# Patient Record
Sex: FEMALE | Race: WHITE
[De-identification: names, ages, dates, MRNs, and addresses within clinical notes are randomized per-mention and may not be internally consistent; named-entity substitution may affect disease eponyms.]

---

## 2019-11-16 ENCOUNTER — HOSPITAL ENCOUNTER (EMERGENCY)
Age: 64
Discharge: HOME | End: 2019-11-16
Payer: COMMERCIAL

## 2019-11-16 VITALS — BODY MASS INDEX: 30.7 KG/M2 | BODY MASS INDEX: 30.1 KG/M2

## 2019-11-16 VITALS
OXYGEN SATURATION: 96 % | SYSTOLIC BLOOD PRESSURE: 149 MMHG | HEART RATE: 83 BPM | RESPIRATION RATE: 17 BRPM | DIASTOLIC BLOOD PRESSURE: 81 MMHG

## 2019-11-16 VITALS
OXYGEN SATURATION: 95 % | RESPIRATION RATE: 16 BRPM | DIASTOLIC BLOOD PRESSURE: 92 MMHG | HEART RATE: 87 BPM | TEMPERATURE: 98.24 F | SYSTOLIC BLOOD PRESSURE: 155 MMHG

## 2019-11-16 DIAGNOSIS — E78.00: ICD-10-CM
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
## 2019-11-16 DIAGNOSIS — I10: ICD-10-CM
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
## 2019-11-16 DIAGNOSIS — Z79.899: ICD-10-CM
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
## 2019-11-16 DIAGNOSIS — S82.001A: Primary | ICD-10-CM

## 2019-11-16 PROCEDURE — 99284 EMERGENCY DEPT VISIT MOD MDM: CPT

## 2019-11-16 PROCEDURE — 73564 X-RAY EXAM KNEE 4 OR MORE: CPT

## 2020-08-28 ENCOUNTER — HOSPITAL ENCOUNTER (OUTPATIENT)
Age: 65
Discharge: HOME | End: 2020-08-28
Payer: COMMERCIAL

## 2020-08-28 VITALS — BODY MASS INDEX: 30.7 KG/M2

## 2020-08-28 DIAGNOSIS — Z12.31: Primary | ICD-10-CM

## 2020-08-28 PROCEDURE — 77063 BREAST TOMOSYNTHESIS BI: CPT

## 2020-08-28 PROCEDURE — 77067 SCR MAMMO BI INCL CAD: CPT

## 2021-11-12 ENCOUNTER — HOSPITAL ENCOUNTER (EMERGENCY)
Age: 66
Discharge: HOME | End: 2021-11-12
Payer: COMMERCIAL

## 2021-11-12 VITALS
RESPIRATION RATE: 16 BRPM | DIASTOLIC BLOOD PRESSURE: 64 MMHG | SYSTOLIC BLOOD PRESSURE: 130 MMHG | HEART RATE: 86 BPM | OXYGEN SATURATION: 95 %

## 2021-11-12 VITALS
SYSTOLIC BLOOD PRESSURE: 130 MMHG | HEART RATE: 89 BPM | DIASTOLIC BLOOD PRESSURE: 85 MMHG | OXYGEN SATURATION: 92 % | RESPIRATION RATE: 14 BRPM

## 2021-11-12 VITALS
RESPIRATION RATE: 17 BRPM | TEMPERATURE: 96.62 F | OXYGEN SATURATION: 96 % | DIASTOLIC BLOOD PRESSURE: 77 MMHG | SYSTOLIC BLOOD PRESSURE: 148 MMHG | HEART RATE: 96 BPM

## 2021-11-12 VITALS — BODY MASS INDEX: 32.5 KG/M2

## 2021-11-12 VITALS — DIASTOLIC BLOOD PRESSURE: 77 MMHG | SYSTOLIC BLOOD PRESSURE: 148 MMHG

## 2021-11-12 DIAGNOSIS — I10: ICD-10-CM

## 2021-11-12 DIAGNOSIS — R10.13: Primary | ICD-10-CM

## 2021-11-12 DIAGNOSIS — Z79.899: ICD-10-CM

## 2021-11-12 DIAGNOSIS — E87.6: ICD-10-CM

## 2021-11-12 DIAGNOSIS — M19.90: ICD-10-CM

## 2021-11-12 LAB
ALANINE AMINOTRANSFER ALT/SGPT: 25 U/L (ref 13–56)
ALBUMIN SERPL-MCNC: 3.3 G/DL (ref 3.2–5)
ALKALINE PHOSPHATASE: 74 U/L (ref 45–117)
ANION GAP: 8 (ref 5–15)
AST(SGOT): 15 U/L (ref 15–37)
BILIRUB DIRECT SERPL-MCNC: 0.2 MG/DL (ref 0–0.3)
BUN SERPL-MCNC: 12 MG/DL (ref 7–18)
BUN/CREAT RATIO: 17.3 RATIO (ref 10–20)
CALCIUM SERPL-MCNC: 8.4 MG/DL (ref 8.5–10.1)
CARBON DIOXIDE: 27 MMOL/L (ref 21–32)
CHLORIDE: 103 MMOL/L (ref 98–107)
DEPRECATED RDW RBC: 38.1 FL (ref 35.1–43.9)
ERYTHROCYTE [DISTWIDTH] IN BLOOD: 12 % (ref 11.6–14.6)
EST GLOM FILT RATE - AFR AMER: 109 ML/MIN (ref 60–?)
ESTIMATED CREATININE CLEARANCE: 90.75 ML/MIN
GLOBULIN: 3.4 G/DL (ref 2.2–4.2)
GLUCOSE: 143 MG/DL (ref 74–106)
HCT VFR BLD AUTO: 43.6 % (ref 37–47)
HEMOGLOBIN: 15.6 G/DL (ref 12–15)
HGB BLD-MCNC: 15.6 G/DL (ref 12–15)
IMMATURE GRANULOCYTES COUNT: 0.03 X10^3/UL (ref 0–0)
LIPASE: 77 U/L (ref 73–393)
MCV RBC: 86.3 FL (ref 81–99)
MEAN CORP HGB CONC: 35.8 G/DL (ref 32–36)
MEAN PLATELET VOL.: 9.3 FL (ref 6.2–12)
NRBC FLAGGED BY ANALYZER: 0 % (ref 0–5)
PLATELET # BLD: 210 K/MM3 (ref 150–450)
PLATELET COUNT: 210 K/MM3 (ref 150–450)
POTASSIUM: 2.9 MMOL/L (ref 3.5–5.1)
RBC # BLD AUTO: 5.05 M/MM3 (ref 4.2–5.4)
RBC DISTRIBUTION WIDTH CV: 12 % (ref 11.6–14.6)
RBC DISTRIBUTION WIDTH SD: 38.1 FL (ref 35.1–43.9)
WBC # BLD AUTO: 7.6 K/MM3 (ref 4.4–11)
WHITE BLOOD COUNT: 7.6 K/MM3 (ref 4.4–11)

## 2021-11-12 PROCEDURE — 83690 ASSAY OF LIPASE: CPT

## 2021-11-12 PROCEDURE — 80048 BASIC METABOLIC PNL TOTAL CA: CPT

## 2021-11-12 PROCEDURE — 99284 EMERGENCY DEPT VISIT MOD MDM: CPT

## 2021-11-12 PROCEDURE — 96361 HYDRATE IV INFUSION ADD-ON: CPT

## 2021-11-12 PROCEDURE — 85025 COMPLETE CBC W/AUTO DIFF WBC: CPT

## 2021-11-12 PROCEDURE — 96375 TX/PRO/DX INJ NEW DRUG ADDON: CPT

## 2021-11-12 PROCEDURE — 80076 HEPATIC FUNCTION PANEL: CPT

## 2021-11-12 PROCEDURE — 76705 ECHO EXAM OF ABDOMEN: CPT

## 2021-11-12 PROCEDURE — 96374 THER/PROPH/DIAG INJ IV PUSH: CPT

## 2021-11-12 PROCEDURE — A4216 STERILE WATER/SALINE, 10 ML: HCPCS

## 2021-11-24 ENCOUNTER — HOSPITAL ENCOUNTER (OUTPATIENT)
Age: 66
End: 2021-11-24
Payer: COMMERCIAL

## 2021-11-24 DIAGNOSIS — K75.81: Primary | ICD-10-CM

## 2021-11-24 PROCEDURE — 76981 USE PARENCHYMA: CPT

## 2021-12-23 ENCOUNTER — HOSPITAL ENCOUNTER (OUTPATIENT)
Age: 66
End: 2021-12-23
Payer: COMMERCIAL

## 2021-12-23 DIAGNOSIS — U07.1: Primary | ICD-10-CM

## 2021-12-23 PROCEDURE — U0003 INFECTIOUS AGENT DETECTION BY NUCLEIC ACID (DNA OR RNA); SEVERE ACUTE RESPIRATORY SYNDROME CORONAVIRUS 2 (SARS-COV-2) (CORONAVIRUS DISEASE [COVID-19]), AMPLIFIED PROBE TECHNIQUE, MAKING USE OF HIGH THROUGHPUT TECHNOLOGIES AS DESCRIBED BY CMS-2020-01-R: HCPCS

## 2021-12-23 PROCEDURE — U0005 INFEC AGEN DETEC AMPLI PROBE: HCPCS

## 2021-12-23 PROCEDURE — 87635 SARS-COV-2 COVID-19 AMP PRB: CPT

## 2022-01-14 ENCOUNTER — HOSPITAL ENCOUNTER (OUTPATIENT)
Dept: HOSPITAL 100 - LAB | Age: 67
Discharge: ANOTHER HOSPITAL | End: 2022-01-14
Payer: COMMERCIAL

## 2022-01-14 DIAGNOSIS — B19.20: Primary | ICD-10-CM

## 2022-01-14 LAB
ALANINE AMINOTRANSFER ALT/SGPT: 34 U/L (ref 13–56)
ALBUMIN SERPL-MCNC: 3.6 G/DL (ref 3.2–5)
ALKALINE PHOSPHATASE: 73 U/L (ref 45–117)
ANION GAP: 4 (ref 5–15)
AST(SGOT): 20 U/L (ref 15–37)
BUN SERPL-MCNC: 15 MG/DL (ref 7–18)
BUN/CREAT RATIO: 22 RATIO (ref 10–20)
CALCIUM SERPL-MCNC: 9.1 MG/DL (ref 8.5–10.1)
CARBON DIOXIDE: 29 MMOL/L (ref 21–32)
CHLORIDE: 106 MMOL/L (ref 98–107)
CRP SERPL-MCNC: < 2.9 MG/L (ref 0–3)
DEPRECATED RDW RBC: 37.8 FL (ref 35.1–43.9)
ERYTHROCYTE [DISTWIDTH] IN BLOOD: 12.1 % (ref 11.6–14.6)
EST GLOM FILT RATE - AFR AMER: 111 ML/MIN (ref 60–?)
GLOBULIN: 3.4 G/DL (ref 2.2–4.2)
GLUCOSE: 121 MG/DL (ref 74–106)
HCT VFR BLD AUTO: 44 % (ref 37–47)
HEMOGLOBIN: 15.8 G/DL (ref 12–15)
HGB BLD-MCNC: 15.8 G/DL (ref 12–15)
IMMATURE GRANULOCYTES COUNT: 0.02 X10^3/UL (ref 0–0)
LDH: 215 U/L (ref 84–246)
MCV RBC: 85.4 FL (ref 81–99)
MEAN CORP HGB CONC: 35.9 G/DL (ref 32–36)
MEAN PLATELET VOL.: 10.1 FL (ref 6.2–12)
NRBC FLAGGED BY ANALYZER: 0 % (ref 0–5)
PLATELET # BLD: 218 K/MM3 (ref 150–450)
PLATELET COUNT: 218 K/MM3 (ref 150–450)
POTASSIUM: 3 MMOL/L (ref 3.5–5.1)
RBC # BLD AUTO: 5.15 M/MM3 (ref 4.2–5.4)
RBC DISTRIBUTION WIDTH CV: 12.1 % (ref 11.6–14.6)
RBC DISTRIBUTION WIDTH SD: 37.8 FL (ref 35.1–43.9)
WBC # BLD AUTO: 6.9 K/MM3 (ref 4.4–11)
WHITE BLOOD COUNT: 6.9 K/MM3 (ref 4.4–11)

## 2022-01-14 PROCEDURE — 83615 LACTATE (LD) (LDH) ENZYME: CPT

## 2022-01-14 PROCEDURE — 86705 HEP B CORE ANTIBODY IGM: CPT

## 2022-01-14 PROCEDURE — 86803 HEPATITIS C AB TEST: CPT

## 2022-01-14 PROCEDURE — 87522 HEPATITIS C REVRS TRNSCRPJ: CPT

## 2022-01-14 PROCEDURE — 80053 COMPREHEN METABOLIC PANEL: CPT

## 2022-01-14 PROCEDURE — 86225 DNA ANTIBODY NATIVE: CPT

## 2022-01-14 PROCEDURE — 85652 RBC SED RATE AUTOMATED: CPT

## 2022-01-14 PROCEDURE — 86235 NUCLEAR ANTIGEN ANTIBODY: CPT

## 2022-01-14 PROCEDURE — 87521 HEPATITIS C PROBE&RVRS TRNSC: CPT

## 2022-01-14 PROCEDURE — 86140 C-REACTIVE PROTEIN: CPT

## 2022-01-14 PROCEDURE — 36415 COLL VENOUS BLD VENIPUNCTURE: CPT

## 2022-01-14 PROCEDURE — 86703 HIV-1/HIV-2 1 RESULT ANTBDY: CPT

## 2022-01-14 PROCEDURE — 86708 HEPATITIS A ANTIBODY: CPT

## 2022-01-14 PROCEDURE — 87902 NFCT AGT GNTYP ALYS HEP C: CPT

## 2022-01-14 PROCEDURE — 85025 COMPLETE CBC W/AUTO DIFF WBC: CPT

## 2022-01-14 PROCEDURE — 86704 HEP B CORE ANTIBODY TOTAL: CPT

## 2022-01-14 PROCEDURE — 87340 HEPATITIS B SURFACE AG IA: CPT

## 2022-01-17 LAB
ANTI-CHROMATIN: <0.2 AI (ref 0–0.9)
ANTI-DSDNA  AB: 1 IU/ML (ref 0–9)
ANTI-JO: <0.2 AI (ref 0–0.9)
DSDNA AB SER-ACNC: 1 IU/ML (ref 0–9)
ENA JO1 AB SER-ACNC: <0.2 AI (ref 0–0.9)
HEPATITIS C ANTIBODY: REACTIVE
SJOGREN'S ANTI-SS-A TEST: 0.2 AI (ref 0–0.9)
SJOGREN'S ANTI-SS-B TEST: < 0.2 AI (ref 0–0.9)

## 2022-01-20 ENCOUNTER — HOSPITAL ENCOUNTER (OUTPATIENT)
Age: 67
Discharge: ANOTHER HOSPITAL | End: 2022-01-20
Payer: COMMERCIAL

## 2022-01-20 DIAGNOSIS — R16.0: ICD-10-CM

## 2022-01-20 DIAGNOSIS — K76.0: Primary | ICD-10-CM

## 2022-01-20 LAB
APTT PPP: 31.4 SECONDS (ref 24.1–36.2)
CHOLEST SERPL-MCNC: 176 MG/DL
PROTHROMBIN TIME (PROTIME)PT.: 12.7 SECONDS (ref 11.7–14.9)
TRIGLYCERIDES: 229 MG/DL
VLDLC SERPL-MCNC: 46 MG/DL (ref 5–40)

## 2022-01-20 PROCEDURE — 85610 PROTHROMBIN TIME: CPT

## 2022-01-20 PROCEDURE — 80061 LIPID PANEL: CPT

## 2022-01-20 PROCEDURE — 85730 THROMBOPLASTIN TIME PARTIAL: CPT

## 2022-01-20 PROCEDURE — 36415 COLL VENOUS BLD VENIPUNCTURE: CPT

## 2022-02-11 ENCOUNTER — HOSPITAL ENCOUNTER (OUTPATIENT)
Dept: HOSPITAL 100 - MRI | Age: 67
Discharge: HOME | End: 2022-02-11
Payer: COMMERCIAL

## 2022-02-11 DIAGNOSIS — R23.4: Primary | ICD-10-CM

## 2022-02-11 DIAGNOSIS — R22.41: ICD-10-CM

## 2022-02-11 DIAGNOSIS — M17.11: ICD-10-CM

## 2022-02-11 PROCEDURE — 73721 MRI JNT OF LWR EXTRE W/O DYE: CPT

## 2022-03-03 ENCOUNTER — HOSPITAL ENCOUNTER (OUTPATIENT)
Dept: HOSPITAL 100 - CT | Age: 67
Discharge: HOME | End: 2022-03-03
Payer: COMMERCIAL

## 2022-03-03 DIAGNOSIS — M17.11: Primary | ICD-10-CM

## 2022-03-03 PROCEDURE — 73700 CT LOWER EXTREMITY W/O DYE: CPT

## 2022-03-08 LAB
ANION GAP: 4 (ref 5–15)
APTT PPP: 30.8 SECONDS (ref 24.1–36.2)
BUN SERPL-MCNC: 14 MG/DL (ref 7–18)
BUN/CREAT RATIO: 18.9 RATIO (ref 10–20)
CALCIUM SERPL-MCNC: 9.2 MG/DL (ref 8.5–10.1)
CARBON DIOXIDE: 33 MMOL/L (ref 21–32)
CHLORIDE: 104 MMOL/L (ref 98–107)
DEPRECATED RDW RBC: 40.2 FL (ref 35.1–43.9)
ERYTHROCYTE [DISTWIDTH] IN BLOOD: 12.5 % (ref 11.6–14.6)
EST GLOM FILT RATE - AFR AMER: 101 ML/MIN (ref 60–?)
GLUCOSE: 130 MG/DL (ref 74–106)
HCT VFR BLD AUTO: 46.4 % (ref 37–47)
HEMOGLOBIN: 16.3 G/DL (ref 12–15)
HGB BLD-MCNC: 16.3 G/DL (ref 12–15)
IMMATURE GRANULOCYTES COUNT: 0.03 X10^3/UL (ref 0–0)
MAGNESIUM: 1.8 MG/DL (ref 1.6–2.6)
MCV RBC: 87.5 FL (ref 81–99)
MEAN CORP HGB CONC: 35.1 G/DL (ref 32–36)
MEAN PLATELET VOL.: 10.2 FL (ref 6.2–12)
NRBC FLAGGED BY ANALYZER: 0 % (ref 0–5)
PLATELET # BLD: 229 K/MM3 (ref 150–450)
PLATELET COUNT: 229 K/MM3 (ref 150–450)
POTASSIUM: 3.2 MMOL/L (ref 3.5–5.1)
PROTHROMBIN TIME (PROTIME)PT.: 12.3 SECONDS (ref 11.7–14.9)
RBC # BLD AUTO: 5.3 M/MM3 (ref 4.2–5.4)
RBC DISTRIBUTION WIDTH CV: 12.5 % (ref 11.6–14.6)
RBC DISTRIBUTION WIDTH SD: 40.2 FL (ref 35.1–43.9)
WBC # BLD AUTO: 6.8 K/MM3 (ref 4.4–11)
WHITE BLOOD COUNT: 6.8 K/MM3 (ref 4.4–11)

## 2022-03-15 ENCOUNTER — HOSPITAL ENCOUNTER (INPATIENT)
Dept: HOSPITAL 100 - ACINP | Age: 67
LOS: 1 days | Discharge: HOME HEALTH | DRG: 470 | End: 2022-03-16
Payer: COMMERCIAL

## 2022-03-15 VITALS
DIASTOLIC BLOOD PRESSURE: 73 MMHG | TEMPERATURE: 98.24 F | HEART RATE: 90 BPM | RESPIRATION RATE: 16 BRPM | OXYGEN SATURATION: 98 % | SYSTOLIC BLOOD PRESSURE: 91 MMHG

## 2022-03-15 VITALS
DIASTOLIC BLOOD PRESSURE: 73 MMHG | RESPIRATION RATE: 16 BRPM | HEART RATE: 80 BPM | OXYGEN SATURATION: 97 % | SYSTOLIC BLOOD PRESSURE: 142 MMHG

## 2022-03-15 VITALS
DIASTOLIC BLOOD PRESSURE: 71 MMHG | HEART RATE: 79 BPM | OXYGEN SATURATION: 99 % | SYSTOLIC BLOOD PRESSURE: 142 MMHG | RESPIRATION RATE: 16 BRPM

## 2022-03-15 VITALS
DIASTOLIC BLOOD PRESSURE: 77 MMHG | SYSTOLIC BLOOD PRESSURE: 142 MMHG | OXYGEN SATURATION: 97 % | HEART RATE: 73 BPM | RESPIRATION RATE: 16 BRPM

## 2022-03-15 VITALS
OXYGEN SATURATION: 99 % | SYSTOLIC BLOOD PRESSURE: 142 MMHG | RESPIRATION RATE: 16 BRPM | DIASTOLIC BLOOD PRESSURE: 65 MMHG | HEART RATE: 79 BPM

## 2022-03-15 VITALS
TEMPERATURE: 97.4 F | DIASTOLIC BLOOD PRESSURE: 73 MMHG | OXYGEN SATURATION: 96 % | SYSTOLIC BLOOD PRESSURE: 132 MMHG | HEART RATE: 74 BPM | RESPIRATION RATE: 14 BRPM

## 2022-03-15 VITALS
TEMPERATURE: 97.88 F | SYSTOLIC BLOOD PRESSURE: 124 MMHG | OXYGEN SATURATION: 95 % | HEART RATE: 77 BPM | DIASTOLIC BLOOD PRESSURE: 65 MMHG | RESPIRATION RATE: 16 BRPM

## 2022-03-15 VITALS
OXYGEN SATURATION: 96 % | RESPIRATION RATE: 18 BRPM | TEMPERATURE: 98.1 F | HEART RATE: 76 BPM | DIASTOLIC BLOOD PRESSURE: 65 MMHG | SYSTOLIC BLOOD PRESSURE: 128 MMHG

## 2022-03-15 VITALS
TEMPERATURE: 97.7 F | DIASTOLIC BLOOD PRESSURE: 79 MMHG | SYSTOLIC BLOOD PRESSURE: 137 MMHG | OXYGEN SATURATION: 98 % | HEART RATE: 79 BPM | RESPIRATION RATE: 16 BRPM

## 2022-03-15 VITALS
OXYGEN SATURATION: 96 % | RESPIRATION RATE: 16 BRPM | DIASTOLIC BLOOD PRESSURE: 65 MMHG | HEART RATE: 80 BPM | SYSTOLIC BLOOD PRESSURE: 133 MMHG | TEMPERATURE: 97.88 F

## 2022-03-15 VITALS
HEART RATE: 79 BPM | DIASTOLIC BLOOD PRESSURE: 73 MMHG | SYSTOLIC BLOOD PRESSURE: 90 MMHG | RESPIRATION RATE: 14 BRPM | OXYGEN SATURATION: 99 %

## 2022-03-15 VITALS
HEART RATE: 95 BPM | RESPIRATION RATE: 18 BRPM | OXYGEN SATURATION: 98 % | TEMPERATURE: 98.6 F | DIASTOLIC BLOOD PRESSURE: 73 MMHG | SYSTOLIC BLOOD PRESSURE: 142 MMHG

## 2022-03-15 VITALS
HEART RATE: 73 BPM | SYSTOLIC BLOOD PRESSURE: 118 MMHG | OXYGEN SATURATION: 98 % | DIASTOLIC BLOOD PRESSURE: 73 MMHG | RESPIRATION RATE: 14 BRPM

## 2022-03-15 VITALS — BODY MASS INDEX: 32.8 KG/M2

## 2022-03-15 VITALS
RESPIRATION RATE: 14 BRPM | DIASTOLIC BLOOD PRESSURE: 52 MMHG | HEART RATE: 73 BPM | SYSTOLIC BLOOD PRESSURE: 142 MMHG | OXYGEN SATURATION: 98 %

## 2022-03-15 DIAGNOSIS — Z96.651: ICD-10-CM

## 2022-03-15 DIAGNOSIS — Z79.899: ICD-10-CM

## 2022-03-15 DIAGNOSIS — M17.11: Primary | ICD-10-CM

## 2022-03-15 DIAGNOSIS — K21.9: ICD-10-CM

## 2022-03-15 DIAGNOSIS — E78.00: ICD-10-CM

## 2022-03-15 DIAGNOSIS — Z96.652: ICD-10-CM

## 2022-03-15 DIAGNOSIS — I10: ICD-10-CM

## 2022-03-15 DIAGNOSIS — Z20.822: ICD-10-CM

## 2022-03-15 DIAGNOSIS — M10.9: ICD-10-CM

## 2022-03-15 PROCEDURE — 82985 ASSAY OF GLYCATED PROTEIN: CPT

## 2022-03-15 PROCEDURE — 87081 CULTURE SCREEN ONLY: CPT

## 2022-03-15 PROCEDURE — 97535 SELF CARE MNGMENT TRAINING: CPT

## 2022-03-15 PROCEDURE — 82962 GLUCOSE BLOOD TEST: CPT

## 2022-03-15 PROCEDURE — 97162 PT EVAL MOD COMPLEX 30 MIN: CPT

## 2022-03-15 PROCEDURE — 97110 THERAPEUTIC EXERCISES: CPT

## 2022-03-15 PROCEDURE — 85027 COMPLETE CBC AUTOMATED: CPT

## 2022-03-15 PROCEDURE — 97166 OT EVAL MOD COMPLEX 45 MIN: CPT

## 2022-03-15 PROCEDURE — C9803 HOPD COVID-19 SPEC COLLECT: HCPCS

## 2022-03-15 PROCEDURE — 83735 ASSAY OF MAGNESIUM: CPT

## 2022-03-15 PROCEDURE — 86901 BLOOD TYPING SEROLOGIC RH(D): CPT

## 2022-03-15 PROCEDURE — 86850 RBC ANTIBODY SCREEN: CPT

## 2022-03-15 PROCEDURE — 88311 DECALCIFY TISSUE: CPT

## 2022-03-15 PROCEDURE — 85730 THROMBOPLASTIN TIME PARTIAL: CPT

## 2022-03-15 PROCEDURE — 80048 BASIC METABOLIC PNL TOTAL CA: CPT

## 2022-03-15 PROCEDURE — 85025 COMPLETE CBC W/AUTO DIFF WBC: CPT

## 2022-03-15 PROCEDURE — C1776 JOINT DEVICE (IMPLANTABLE): HCPCS

## 2022-03-15 PROCEDURE — 0SRC0JZ REPLACEMENT OF RIGHT KNEE JOINT WITH SYNTHETIC SUBSTITUTE, OPEN APPROACH: ICD-10-PCS | Performed by: ORTHOPAEDIC SURGERY

## 2022-03-15 PROCEDURE — 85610 PROTHROMBIN TIME: CPT

## 2022-03-15 PROCEDURE — 97530 THERAPEUTIC ACTIVITIES: CPT

## 2022-03-15 PROCEDURE — 97116 GAIT TRAINING THERAPY: CPT

## 2022-03-15 PROCEDURE — 87811 SARS-COV-2 COVID19 W/OPTIC: CPT

## 2022-03-15 PROCEDURE — 73560 X-RAY EXAM OF KNEE 1 OR 2: CPT

## 2022-03-15 PROCEDURE — 88305 TISSUE EXAM BY PATHOLOGIST: CPT

## 2022-03-15 PROCEDURE — 86900 BLOOD TYPING SEROLOGIC ABO: CPT

## 2022-03-15 PROCEDURE — 36415 COLL VENOUS BLD VENIPUNCTURE: CPT

## 2022-03-15 PROCEDURE — 93005 ELECTROCARDIOGRAM TRACING: CPT

## 2022-03-15 RX ADMIN — SODIUM CHLORIDE 10 ML: 9 INJECTION, SOLUTION INTRAMUSCULAR; INTRAVENOUS; SUBCUTANEOUS at 10:13

## 2022-03-15 RX ADMIN — TRANEXAMIC ACID 660 MG: 100 INJECTION, SOLUTION INTRAVENOUS at 08:30

## 2022-03-15 RX ADMIN — TRANEXAMIC ACID 660 MG: 100 INJECTION, SOLUTION INTRAVENOUS at 09:05

## 2022-03-15 RX ADMIN — SCOPOLAMINE 1 PATCH: 1.5 PATCH, EXTENDED RELEASE TRANSDERMAL at 06:47

## 2022-03-15 RX ADMIN — DOCUSATE SODIUM 50 MG AND SENNOSIDES 8.6 MG 2 TABLET: 8.6; 5 TABLET, FILM COATED ORAL at 20:41

## 2022-03-15 RX ADMIN — BETAMETHASONE SODIUM PHOSPHATE AND BETAMETHASONE ACETATE 30 MG: 3; 3 INJECTION, SUSPENSION INTRA-ARTICULAR; INTRALESIONAL; INTRAMUSCULAR at 10:13

## 2022-03-15 RX ADMIN — EPINEPHRINE 1 MG: 1 INJECTION INTRAMUSCULAR; INTRAVENOUS; SUBCUTANEOUS at 10:13

## 2022-03-16 VITALS
TEMPERATURE: 97.52 F | OXYGEN SATURATION: 97 % | HEART RATE: 88 BPM | DIASTOLIC BLOOD PRESSURE: 70 MMHG | SYSTOLIC BLOOD PRESSURE: 127 MMHG | RESPIRATION RATE: 18 BRPM

## 2022-03-16 VITALS
RESPIRATION RATE: 18 BRPM | TEMPERATURE: 98.4 F | OXYGEN SATURATION: 98 % | DIASTOLIC BLOOD PRESSURE: 87 MMHG | SYSTOLIC BLOOD PRESSURE: 143 MMHG | HEART RATE: 74 BPM

## 2022-03-16 VITALS
SYSTOLIC BLOOD PRESSURE: 106 MMHG | HEART RATE: 57 BPM | RESPIRATION RATE: 18 BRPM | DIASTOLIC BLOOD PRESSURE: 54 MMHG | OXYGEN SATURATION: 97 % | TEMPERATURE: 97.8 F

## 2022-03-16 LAB
ANION GAP: 4 (ref 5–15)
BUN SERPL-MCNC: 11 MG/DL (ref 7–18)
BUN/CREAT RATIO: 15.9 RATIO (ref 10–20)
CALCIUM SERPL-MCNC: 8.2 MG/DL (ref 8.5–10.1)
CARBON DIOXIDE: 33 MMOL/L (ref 21–32)
CHLORIDE: 103 MMOL/L (ref 98–107)
DEPRECATED RDW RBC: 40 FL (ref 35.1–43.9)
ERYTHROCYTE [DISTWIDTH] IN BLOOD: 12.5 % (ref 11.6–14.6)
EST GLOM FILT RATE - AFR AMER: 110 ML/MIN (ref 60–?)
ESTIMATED CREATININE CLEARANCE: 89.07 ML/MIN
GLUCOSE: 132 MG/DL (ref 74–106)
HCT VFR BLD AUTO: 37.3 % (ref 37–47)
HEMOGLOBIN: 13.1 G/DL (ref 12–15)
HGB BLD-MCNC: 13.1 G/DL (ref 12–15)
MCV RBC: 87.4 FL (ref 81–99)
MEAN CORP HGB CONC: 35.1 G/DL (ref 32–36)
MEAN PLATELET VOL.: 9.5 FL (ref 6.2–12)
PLATELET # BLD: 202 K/MM3 (ref 150–450)
PLATELET COUNT: 202 K/MM3 (ref 150–450)
POTASSIUM: 3.1 MMOL/L (ref 3.5–5.1)
RBC # BLD AUTO: 4.27 M/MM3 (ref 4.2–5.4)
RBC DISTRIBUTION WIDTH CV: 12.5 % (ref 11.6–14.6)
RBC DISTRIBUTION WIDTH SD: 40 FL (ref 35.1–43.9)
WBC # BLD AUTO: 10.5 K/MM3 (ref 4.4–11)
WHITE BLOOD COUNT: 10.5 K/MM3 (ref 4.4–11)

## 2022-03-16 RX ADMIN — POTASSIUM CHLORIDE 10 MEQ: 750 TABLET, EXTENDED RELEASE ORAL at 07:32

## 2022-03-16 RX ADMIN — DOCUSATE SODIUM 50 MG AND SENNOSIDES 8.6 MG 2 TABLET: 8.6; 5 TABLET, FILM COATED ORAL at 10:07

## 2022-03-16 RX ADMIN — APIXABAN 2.5 MG: 2.5 TABLET, FILM COATED ORAL at 06:28

## 2022-06-13 ENCOUNTER — HOSPITAL ENCOUNTER (OUTPATIENT)
Dept: HOSPITAL 100 - PT | Age: 67
Discharge: HOME | End: 2022-06-13
Payer: COMMERCIAL

## 2022-06-13 DIAGNOSIS — Z96.651: Primary | ICD-10-CM

## 2022-06-13 DIAGNOSIS — Z47.1: ICD-10-CM

## 2022-06-13 PROCEDURE — 97162 PT EVAL MOD COMPLEX 30 MIN: CPT

## 2022-06-13 PROCEDURE — 97110 THERAPEUTIC EXERCISES: CPT

## 2022-06-13 PROCEDURE — 97164 PT RE-EVAL EST PLAN CARE: CPT

## 2022-10-03 ENCOUNTER — HOSPITAL ENCOUNTER (OUTPATIENT)
Dept: HOSPITAL 100 - LABSPEC | Age: 67
Discharge: HOME | End: 2022-10-03
Payer: COMMERCIAL

## 2022-10-03 DIAGNOSIS — N30.00: Primary | ICD-10-CM

## 2022-10-03 PROCEDURE — 87186 SC STD MICRODIL/AGAR DIL: CPT

## 2022-10-03 PROCEDURE — 87086 URINE CULTURE/COLONY COUNT: CPT

## 2022-10-03 PROCEDURE — 87088 URINE BACTERIA CULTURE: CPT

## 2022-10-28 ENCOUNTER — HOSPITAL ENCOUNTER (OUTPATIENT)
Dept: HOSPITAL 100 - US | Age: 67
Discharge: HOME | End: 2022-10-28
Payer: COMMERCIAL

## 2022-10-28 DIAGNOSIS — N30.00: Primary | ICD-10-CM

## 2022-10-28 PROCEDURE — 76770 US EXAM ABDO BACK WALL COMP: CPT

## 2023-04-12 ENCOUNTER — HOSPITAL ENCOUNTER (OUTPATIENT)
Dept: HOSPITAL 100 - LAB | Age: 68
Discharge: HOME | End: 2023-04-12
Payer: COMMERCIAL

## 2023-04-12 DIAGNOSIS — K76.0: Primary | ICD-10-CM

## 2023-04-12 LAB
ALANINE AMINOTRANSFER ALT/SGPT: 25 U/L (ref 13–56)
ALBUMIN SERPL-MCNC: 3.6 G/DL (ref 3.2–5)
ALKALINE PHOSPHATASE: 94 U/L (ref 45–117)
ANION GAP: 2 (ref 5–15)
AST(SGOT): 16 U/L (ref 15–37)
BUN SERPL-MCNC: 15 MG/DL (ref 7–18)
BUN/CREAT RATIO: 10 RATIO (ref 10–20)
CALCIUM SERPL-MCNC: 9.2 MG/DL (ref 8.5–10.1)
CARBON DIOXIDE: 28 MMOL/L (ref 21–32)
CHLORIDE: 109 MMOL/L (ref 98–107)
DEPRECATED RDW RBC: 39.1 FL (ref 35.1–43.9)
ERYTHROCYTE [DISTWIDTH] IN BLOOD: 12.1 % (ref 11.6–14.6)
EST GLOM FILT RATE - AFR AMER: 45 ML/MIN (ref 60–?)
GLOBULIN: 3.4 G/DL (ref 2.2–4.2)
GLUCOSE: 159 MG/DL (ref 74–106)
HCT VFR BLD AUTO: 47.4 % (ref 37–47)
HEMOGLOBIN: 16.1 G/DL (ref 12–15)
HGB BLD-MCNC: 16.1 G/DL (ref 12–15)
IMMATURE GRANULOCYTES COUNT: 0.03 X10^3/UL (ref 0–0)
MCV RBC: 89.1 FL (ref 81–99)
MEAN CORP HGB CONC: 34 G/DL (ref 32–36)
MEAN PLATELET VOL.: 10 FL (ref 6.2–12)
NRBC FLAGGED BY ANALYZER: 0 % (ref 0–5)
PLATELET # BLD: 241 K/MM3 (ref 150–450)
PLATELET COUNT: 241 K/MM3 (ref 150–450)
POTASSIUM: 4.1 MMOL/L (ref 3.5–5.1)
PROTHROMBIN TIME (PROTIME)PT.: 12.7 SECONDS (ref 11.7–14.9)
RBC # BLD AUTO: 5.32 M/MM3 (ref 4.2–5.4)
RBC DISTRIBUTION WIDTH CV: 12.1 % (ref 11.6–14.6)
RBC DISTRIBUTION WIDTH SD: 39.1 FL (ref 35.1–43.9)
WBC # BLD AUTO: 8.5 K/MM3 (ref 4.4–11)
WHITE BLOOD COUNT: 8.5 K/MM3 (ref 4.4–11)

## 2023-04-12 PROCEDURE — 36415 COLL VENOUS BLD VENIPUNCTURE: CPT

## 2023-04-12 PROCEDURE — 80053 COMPREHEN METABOLIC PANEL: CPT

## 2023-04-12 PROCEDURE — 85610 PROTHROMBIN TIME: CPT

## 2023-04-12 PROCEDURE — 83036 HEMOGLOBIN GLYCOSYLATED A1C: CPT

## 2023-04-12 PROCEDURE — 85025 COMPLETE CBC W/AUTO DIFF WBC: CPT

## 2023-05-15 ENCOUNTER — HOSPITAL ENCOUNTER (OUTPATIENT)
Age: 68
Discharge: HOME | End: 2023-05-15
Payer: COMMERCIAL

## 2023-05-15 DIAGNOSIS — N39.0: Primary | ICD-10-CM

## 2023-05-15 LAB
BILIRUB UR QL STRIP: 1 MG/DL
KETONE-DIPSTICK: 5 MG/DL
LEUKOCYTE ESTERASE UR QL STRIP: 100 /UL
MUCOUS THREADS URNS QL MICRO: (no result) /HPF
PROT UR QL STRIP.AUTO: 30 MG/DL
RBC UR QL: (no result) /HPF (ref 0–5)
RBC UR QL: 10 /UL
SP GR UR: 1.01 (ref 1–1.03)
SQUAMOUS URNS QL MICRO: (no result) /HPF (ref 5–10)
URINE PRESERVATIVE: (no result)

## 2023-05-15 PROCEDURE — 81001 URINALYSIS AUTO W/SCOPE: CPT

## 2023-05-15 PROCEDURE — 87086 URINE CULTURE/COLONY COUNT: CPT

## 2023-05-15 PROCEDURE — 87088 URINE BACTERIA CULTURE: CPT

## 2023-05-17 ENCOUNTER — HOSPITAL ENCOUNTER (OUTPATIENT)
Dept: HOSPITAL 100 - MTLAB | Age: 68
Discharge: HOME | End: 2023-05-17
Payer: COMMERCIAL

## 2023-05-17 DIAGNOSIS — R94.4: Primary | ICD-10-CM

## 2023-05-17 DIAGNOSIS — R63.5: ICD-10-CM

## 2023-05-17 LAB
ALBUMIN SERPL-MCNC: 3.8 G/DL (ref 3.2–5)
BUN SERPL-MCNC: 13 MG/DL (ref 7–18)
BUN/CREAT RATIO: 19.9 RATIO (ref 10–20)
CALCIUM SERPL-MCNC: 9.2 MG/DL (ref 8.5–10.1)
CARBON DIOXIDE: 27 MMOL/L (ref 21–32)
CHLORIDE: 108 MMOL/L (ref 98–107)
EST GLOM FILT RATE - AFR AMER: 116 ML/MIN (ref 60–?)
GLUCOSE: 92 MG/DL (ref 74–106)
MICROALBUMIN UR-MCNC: 81.2 MG/L
POTASSIUM: 3.5 MMOL/L (ref 3.5–5.1)

## 2023-05-17 PROCEDURE — 80069 RENAL FUNCTION PANEL: CPT

## 2023-05-17 PROCEDURE — 36415 COLL VENOUS BLD VENIPUNCTURE: CPT

## 2023-05-17 PROCEDURE — 84443 ASSAY THYROID STIM HORMONE: CPT

## 2023-05-17 PROCEDURE — 82043 UR ALBUMIN QUANTITATIVE: CPT

## 2023-05-26 ENCOUNTER — HOSPITAL ENCOUNTER (OUTPATIENT)
Dept: HOSPITAL 100 - MTLAB | Age: 68
Discharge: HOME | End: 2023-05-26
Payer: COMMERCIAL

## 2023-05-26 DIAGNOSIS — R25.2: Primary | ICD-10-CM

## 2023-05-26 LAB
DEPRECATED RDW RBC: 41.1 FL (ref 35.1–43.9)
ERYTHROCYTE [DISTWIDTH] IN BLOOD: 12.6 % (ref 11.6–14.6)
HCT VFR BLD AUTO: 46.3 % (ref 37–47)
HEMOGLOBIN: 16.1 G/DL (ref 12–15)
HGB BLD-MCNC: 16.1 G/DL (ref 12–15)
IMMATURE GRANULOCYTES COUNT: 0.02 X10^3/UL (ref 0–0)
MAGNESIUM: 2.1 MG/DL (ref 1.6–2.6)
MCV RBC: 88.7 FL (ref 81–99)
MEAN CORP HGB CONC: 34.8 G/DL (ref 32–36)
MEAN PLATELET VOL.: 9.6 FL (ref 6.2–12)
NRBC FLAGGED BY ANALYZER: 0 % (ref 0–5)
PLATELET # BLD: 246 K/MM3 (ref 150–450)
PLATELET COUNT: 246 K/MM3 (ref 150–450)
RBC # BLD AUTO: 5.22 M/MM3 (ref 4.2–5.4)
RBC DISTRIBUTION WIDTH CV: 12.6 % (ref 11.6–14.6)
RBC DISTRIBUTION WIDTH SD: 41.1 FL (ref 35.1–43.9)
VITAMIN B12: 375 PG/ML (ref 211–911)
WBC # BLD AUTO: 8.9 K/MM3 (ref 4.4–11)
WHITE BLOOD COUNT: 8.9 K/MM3 (ref 4.4–11)

## 2023-05-26 PROCEDURE — 85025 COMPLETE CBC W/AUTO DIFF WBC: CPT

## 2023-05-26 PROCEDURE — 36415 COLL VENOUS BLD VENIPUNCTURE: CPT

## 2023-05-26 PROCEDURE — 83036 HEMOGLOBIN GLYCOSYLATED A1C: CPT

## 2023-05-26 PROCEDURE — 83735 ASSAY OF MAGNESIUM: CPT

## 2023-05-26 PROCEDURE — 82607 VITAMIN B-12: CPT

## 2023-11-10 ENCOUNTER — HOSPITAL ENCOUNTER (OUTPATIENT)
Dept: HOSPITAL 100 - MTRAD | Age: 68
Discharge: HOME | End: 2023-11-10
Payer: COMMERCIAL

## 2023-11-10 DIAGNOSIS — M79.89: ICD-10-CM

## 2023-11-10 DIAGNOSIS — R60.0: Primary | ICD-10-CM

## 2023-11-10 PROCEDURE — 73630 X-RAY EXAM OF FOOT: CPT

## 2023-11-10 PROCEDURE — 73610 X-RAY EXAM OF ANKLE: CPT

## 2024-08-19 ENCOUNTER — HOSPITAL ENCOUNTER (OUTPATIENT)
Age: 69
Discharge: HOME | End: 2024-08-19
Payer: COMMERCIAL

## 2024-08-19 DIAGNOSIS — M79.662: Primary | ICD-10-CM

## 2024-08-19 PROCEDURE — 93971 EXTREMITY STUDY: CPT

## 2024-08-30 ENCOUNTER — HOSPITAL ENCOUNTER (OUTPATIENT)
Dept: HOSPITAL 100 - MFPLAB | Age: 69
Discharge: HOME | End: 2024-08-30
Payer: COMMERCIAL

## 2024-08-30 DIAGNOSIS — R53.83: ICD-10-CM

## 2024-08-30 DIAGNOSIS — M79.89: ICD-10-CM

## 2024-08-30 DIAGNOSIS — Z78.0: ICD-10-CM

## 2024-08-30 DIAGNOSIS — M79.662: Primary | ICD-10-CM

## 2024-08-30 LAB
ALANINE AMINOTRANSFER ALT/SGPT: 21 U/L (ref 13–56)
ALBUMIN SERPL-MCNC: 3.3 G/DL (ref 3.2–5)
ALKALINE PHOSPHATASE: 106 U/L (ref 45–117)
ANION GAP: 6 (ref 5–15)
AST(SGOT): 17 U/L (ref 15–37)
BUN SERPL-MCNC: 12 MG/DL (ref 7–18)
BUN/CREAT RATIO: 17.4 RATIO (ref 10–20)
CALCIUM SERPL-MCNC: 9 MG/DL (ref 8.5–10.1)
CARBON DIOXIDE: 26 MMOL/L (ref 21–32)
CHLORIDE: 110 MMOL/L (ref 98–107)
DEPRECATED RDW RBC: 41 FL (ref 35.1–43.9)
ERYTHROCYTE [DISTWIDTH] IN BLOOD: 12.5 % (ref 11.6–14.6)
EST GLOM FILT RATE - AFR AMER: 109 ML/MIN (ref 60–?)
GLOBULIN: 3.5 G/DL (ref 2.2–4.2)
GLUCOSE: 136 MG/DL (ref 74–106)
HCT VFR BLD AUTO: 45.7 % (ref 37–47)
HEMOGLOBIN: 15.3 G/DL (ref 12–15)
HGB BLD-MCNC: 15.3 G/DL (ref 12–15)
IMMATURE GRANULOCYTES COUNT: 0.04 X10^3/UL (ref 0–0)
MCV RBC: 88.6 FL (ref 81–99)
MEAN CORP HGB CONC: 33.5 G/DL (ref 32–36)
MEAN PLATELET VOL.: 10.6 FL (ref 6.2–12)
NRBC FLAGGED BY ANALYZER: 0 % (ref 0–5)
PLATELET # BLD: 240 K/MM3 (ref 150–450)
PLATELET COUNT: 240 K/MM3 (ref 150–450)
POTASSIUM: 4 MMOL/L (ref 3.5–5.1)
RBC # BLD AUTO: 5.16 M/MM3 (ref 4.2–5.4)
RBC DISTRIBUTION WIDTH CV: 12.5 % (ref 11.6–14.6)
RBC DISTRIBUTION WIDTH SD: 41 FL (ref 35.1–43.9)
VITAMIN D,25 HYDROXY: 22.1 NG/ML
WBC # BLD AUTO: 7.9 K/MM3 (ref 4.4–11)
WHITE BLOOD COUNT: 7.9 K/MM3 (ref 4.4–11)

## 2024-08-30 PROCEDURE — 84443 ASSAY THYROID STIM HORMONE: CPT

## 2024-08-30 PROCEDURE — 36415 COLL VENOUS BLD VENIPUNCTURE: CPT

## 2024-08-30 PROCEDURE — 82306 VITAMIN D 25 HYDROXY: CPT

## 2024-08-30 PROCEDURE — 80053 COMPREHEN METABOLIC PANEL: CPT

## 2024-08-30 PROCEDURE — 85025 COMPLETE CBC W/AUTO DIFF WBC: CPT

## 2024-08-30 NOTE — XMS RPT_ITS
Comprehensive CCD (C-CDA v2.1)  
  
                           Created on: 2024  
  
  
MARC DECKER  
External Reference #: CDR,PersonID:[id number]  
: 1955  
Sex: Female  
  
Demographics  
  
  
                                        Address             [address] [place]  [address]  
   
                                        Home Phone          [phone]  
   
                                        Home Phone          [phone]  
   
                                        Home Phone          [phone]  
   
                                        Work Phone          [phone]  
   
                                        Home Phone          [phone]  
   
                                        Work Phone          [phone]  
   
                                        Home Phone          [phone]  
   
                                        Work Phone          [phone]  
   
                                        Home Phone          [phone]  
   
                                        Preferred Language  en  
   
                                        Marital Status        
   
                                        Mandaen Affiliation Unknown  
   
                                        Race                White  
   
                                        Ethnic Group        Not  or Lati  
no  
  
  
Author  
  
  
                                        Organization        [facility]  
  
  
Care Team Providers  
  
  
                                Care Team Member Name Role            Phone  
   
                                Elijah Bronson    Attending       Unavailable  
   
                                Dandre Pedro [facility]    Unavailable  
   
                                Dandre Pedro Admitting       Unavailable  
   
                                Dandre Pedro Attending       Unavailable  
   
                                Dandre Pedro [facility]    Unavailable  
   
                                Elijah Bronson    Admitting       Unavailable  
   
                                Elijah Bronson    Attending       Unavailable  
   
                                Dandre Pedro [facility]    Unavailable  
   
                                Willis Savana   Unavailable     Unavailable  
   
                                Wiggelena Savana B Unavailable     Unavailable  
   
                                Davis Cason  Unavailable     Unavailable  
   
                                Wiggers Savana B Unavailable     [phone]  
   
                                Unavailable     Unavailable     [phone]  
   
                                Unavailable     Unavailable     [phone]  
   
                                Savana Pedro DO Primary Care Provider [phone]   
   
                                SAVANA PEDRO Primary Care    Unavailable  
   
                                WILLIS SAVANA B Primary Care    Unavailable  
   
                                RAEANN SHORT Attending       Unavailable  
   
                                DONY RAHMAN DO Attending       Unavailable  
   
                                Unknown, Referring Provider Unavailable     Unav  
ailable  
   
                                Akshat Peterson   Attending       Unavailable  
   
                                Akshta Peterson   Referring       Unavailable  
   
                                FRANCY MOSLEY Attending       Unavailable  
   
                                WILLIAN COATES Primary Care    Unavailable  
   
                                JANETTE FLOR CNP Attending       UnavailWILLIAN Mcclain Primary Care    Unavailable  
   
                                JANETTE FLOR CNP Attending       UnavailWILLIAN Mcclain Primary Care    Unavailable  
   
                                WILLIAN COATES Primary Care    Unavailable  
  
  
  
Allergies  
  
  
                                                    Allergy   
Classification                          Reported   
Allergen(s)               Allergy Type              Date of   
Onset                     Reaction(s)               Facility  
   
                                                    Penicillins   
(antibiotic)  
(1 source)                              Penicillins;   
Translations:   
[Penicillins]   Drug Allergy                                    [facility]   
Medical   
[facility]  
Work Phone:   
[phone]  
   
                                                    Quinolones   
(antibiotic)  
(1 source)                              Ciprofloxacin;   
Translations:   
[Cipro]         Drug Allergy                                    [facility]  
Work Phone:   
[phone]  
   
                                                      
(16 sources)                            Ciprofloxacin;   
Translations:   
[Cipro]         Drug Allergy                                    [facility]  
Work Phone:   
[phone]  
   
                                                      
(17 sources)                            Penicillins;   
Translations:   
[Penicillins]                           Allergy to   
drug   
(finding)                                 
2                                                   [facility]   
Repository  
   
                                                      
(2 sources)                             Ciprofloxacin;   
Translations:   
[CIPROFLOXACIN]           Drug Allergy                
8                                       Other: See   
Comments                                [facility]  
   
                                                      
(1 source)          Penicillins         Drug Allergy          
2                         Hives                     [facility]  
   
                                                      
(2 sources)                             Penicillin;   
Translations:   
[penicillin]    Drug Allergy                                    [facility]  
  
  
  
Medications  
Current Medications  
  
  
  
                      Medication Drug Class(es) Dates      Sig (Normalized) Sig   
(Original)  
   
                                                    oseltamivir 75 mg   
oral capsule  
(1 source)                              Neuraminidase   
Inhibitor                               Start:   
2022  
End:   
2022                              take 1 capsule by   
mouth twice daily                       oseltamivir   
(TAMIFLU) 75 mg   
capsule   
Indications:   
Viral URI with   
cough Take 1   
capsule by mouth   
twice daily for 5   
days. 10 capsule   
0 2022 Active  
   
                                        Comment on above:   Take 1 capsule by mo  
uth twice daily for 5 days.   
  
  
  
Completed/Discontinued Medications  
  
  
  
                      Medication Drug Class(es) Dates      Sig (Normalized) Sig   
(Original)  
   
                                                    1 ml alirocumab 75   
mg/ml   
auto-injector  
(16 sources)              PCSK9 Inhibitor           Start:   
2020                                          Praluent 75 MG/ML   
Subcutaneous   
Solution   
Auto-injector USE   
1 INJECTION EVERY   
2 WEEKS Quantity:   
3 Refills: 3   
Ordered:   
13-Mar-2023   
Savana Pedro DO   
Start :   
23-Sep-2020   
Active  
   
                                                    amLODIPine 5 mg   
oral tablet  
(18 sources)                            Dihydropyridine   
Calcium Channel   
Blocker                                 Start:   
2014                              take 1 tablet by   
mouth once daily                        amLODIPine   
Besylate 5 MG   
Oral Tablet TAKE   
1 TABLET BY MOUTH   
EVERY DAY   
Quantity: 90   
Refills: 3   
Ordered:   
22-May-2022   
Savana Pedro DO   
Start :   
2014   
Active  
   
                                        Comment on above:   Take 5 mg by mouth o  
nce daily.   
   
                                                    ascorbic acid 60   
mg / beta carotene   
5000 unt / copper   
sulfate 40 mg /   
dl-alpha   
tocopheryl acetate   
30 unt / sodium   
selenite 0.04 mg /   
zinc oxide 40 mg   
oral tablet  
(1 source)                Vitamin C                 Start:   
2019                              take 1 tablet by   
mouth once daily                        One Daily For   
Women 50+ Adv   
Oral Tablet   
Refills: 0 Start   
: 22-Mar-2019   
Active  
   
                                                    aspirin 81 mg oral   
tablet  
(1 source)                              Platelet Aggregation   
Inhibitor,   
Nonsteroidal   
Anti-inflammatory   
Drug                                                        Aspirin 81 mg   
CpDR Take by   
mouth. 0 Active  
   
                                        Comment on above:   Take by mouth.   
   
                                                    azithromycin 250   
mg oral tablet  
(6 sources)                             Macrolide   
Antimicrobial                           Start:   
10-                                          Azithromycin 250   
MG Oral Tablet   
TAKE 2 TABLETS ON   
DAY 1 THEN TAKE 1   
TABLET A DAY FOR   
4 DAYS. Quantity:   
1 Refills: 0   
Ordered:   
5-Oct-2021   
Savana Pedro DO   
Start :   
5-Oct-2021 Active  
  
  
  
                                        Start: 2020   take 2 tablets by mo  
uth once   
daily, then take 1 tablet by   
mouth, then take 1 tablet by   
mouth once daily                        Azithromycin 250 MG Oral Tablet TAKE   
2 TABLETS ON DAY 1 THEN TAKE 1   
TABLET A DAY FOR 4 DAYS. Quantity: 1   
Refills: 0 Savana Pedro DO Start :   
2020 Active 6 Tablet Pack  
  
  
  
                                                    celecoxib 200 mg   
oral capsule  
(11 sources)                            Nonsteroidal   
Anti-inflammatory Drug                  Start:   
2020                              take 1   
capsule by   
mouth once   
daily as   
needed                                  Celecoxib 200 MG   
Oral Capsule TAKE   
1 CAPSULE BY MOUTH   
EVERY DAY AS   
NEEDED Quantity:   
90 Refills: 0   
Ordered:   
26-Aug-2021   
Savana Pedro DO   
Start :   
2020 Active   
Remind patient she   
is due for office   
visit  
   
                                                    chlorthalidone 25   
mg oral tablet  
(17 sources)              Thiazide-like Diuretic    Start:   
10-                              take 1   
tablet by   
mouth once   
daily                                   Chlorthalidone 25   
MG Oral Tablet   
TAKE 1 TABLET BY   
MOUTH EVERY DAY   
Quantity: 90   
Refills: 0   
Ordered:   
2023   
Savana Pedro DO   
Start :   
26-Oct-2020 Active  
   
                                        Comment on above:   Take 25 mg by mouth   
once daily.   
   
                                                    1 ml evolocumab 140   
mg/ml auto-injector  
(2 sources)               PCSK9 Inhibitor           Start:   
2016                                          Repatha SureClick   
140 MG/ML   
Subcutaneous   
Solution   
Auto-injector   
INJECT 1 PEN   
(140MG)   
SUBCUTANEOUSLY   
EVERY TWO WEEKS   
Quantity: 3   
Refills: 3 Savana Pedro DO Start :   
2016 Active   
2 x 1 ML Pen  
  
  
  
                                                                EVOLOCUMAB (REPA  
MAURIZIO SYRINGE SUBCUTANEOUS) Inject subcutaneously. Monthly   
injection 0 Active  
  
  
  
                                        Comment on above:   Inject subcutaneousl  
y. Monthly injection   
   
                                                    famotidine 26.6 mg /   
ibuprofen 800 mg oral   
tablet  
(1 source)                              Nonsteroidal   
Anti-inflammatory   
Drug, Histamine-2   
Receptor Antagonist                     Star  
t:   
  
19                                      take 1 tablet by   
mouth twice   
daily                                   Duexis 800-26.6 MG Oral   
Tablet Take 1 tablet   
twice daily Quantity: 9   
Refills: 0 Savana Pedro DO Start :   
2019 Active  
   
                                                    hydroCHLOROthiazide   
12.5 mg oral tablet  
(1 source)                Thiazide Diuretic         Star  
t:   
12-2  
3-20  
19                                                  hydroCHLOROthiazide   
12.5 MG Oral Tablet   
TAKE 1 TABLET DAIY   
Quantity: 90 Refills: 3   
Savana Pedro DO Start   
: 23-Dec-2019 Active  
   
                                                    3 ml liraglutide 6   
mg/ml pen injector  
(10 sources)                            GLP-1 Receptor   
Agonist                                 Star  
t:   
10-2  
6-20  
20                                      inject 3 mg by   
subcutaneous   
injection once   
daily                                   Saxenda 18 MG/3ML   
Subcutaneous Solution   
Pen-injector inject 3   
MG subcutaneously once   
daily Quantity: 1   
Refills: 0 Ordered:   
5-Oct-2021 Savana Pedro DO Start :   
26-Oct-2020 Active  
   
                                                    meloxicam 15 mg oral   
tablet  
(6 sources)                             Nonsteroidal   
Anti-inflammatory   
Drug                                    Star  
t:   
  
22                                      take 1 tablet by   
mouth once daily                        Meloxicam 15 MG Oral   
Tablet TAKE 1 TABLET   
DAILY. Quantity: 90   
Refills: 3 Ordered:   
7-Mar-2022 Savana Pedro DO Start : 7-Mar-2022   
Active  
   
                                                    MV with   
Min-Lycopene-Lutein 0.4   
mg-300 mcg- 250 mcg tab  
(1 source)                                                      MV with   
Min-Lycopene-Lutein 0.4   
mg-300 mcg- 250 mcg tab   
Take by mouth. 0 Active  
   
                                        Comment on above:   Take by mouth.   
   
                                                    One Daily For Women 50+   
Adv Oral Tablet  
(16 sources)                                        Star  
t:   
  
19                                      take 1 tablet by   
mouth once daily                        One Daily For Women 50+   
Adv Oral Tablet   
Quantity: 0 Refills: 0   
Ordered: 22-Mar-2019 DO   
Start : 22-Mar-2019   
Active  
   
                                                    pantoprazole 40 mg   
delayed release oral   
tablet  
(11 sources)                            Proton Pump   
Inhibitor                               Star  
t:   
  
14                                      take 1 capsule   
by mouth once   
daily                                   Pantoprazole Sodium 40   
MG Oral Tablet Delayed   
Release TAKE 1 CAPSULE   
DAILY. Quantity: 90   
Refills: 3 Ordered:   
2020 Savana Pedro DO Start : 2014   
Active  
   
                                                    Potassium  
(1 source)                                                      Potassium 20 mg   
Chew   
Take by mouth. 0 Active  
   
                                        Comment on above:   Take by mouth.   
   
                                                    potassium chloride 8   
meq extended release   
oral tablet  
(17 sources)                                        Star  
t:   
02  
18                                      take 1 tablet by   
mouth once daily                        Klor-Con 8 MEQ Oral   
Tablet Extended Release   
TAKE 1 TABLET DAILY.   
Quantity: 90 Refills: 3   
Ordered: 2020   
Savana Pedro DO Start   
: 2018 Active  
   
                                                    predniSONE 10 mg oral   
tablet  
(4 sources)                                         Star  
t:   
10-0  
  
21                                      take 1 tablet by   
mouth twice   
daily                                   predniSONE 10 MG Oral   
Tablet TAKE 1 TABLET   
TWICE DAILY. Quantity:   
14 Refills: 1 Ordered:   
6-Oct-2021 Savana Pedro DO Start : 6-Oct-2021   
Active  
   
                                                    traMADol hydrochloride   
50 mg disintegrating   
oral tablet  
(1 source)      Opioid Agonist                                  traMADol 50 mg T  
bDL   
Take by mouth. 0 Active  
   
                                        Comment on above:   Take by mouth.   
   
                                                    ursodiol 300 mg oral   
capsule  
(6 sources)               Bile Acid                 Star  
t:   
  
22                                      take 1 capsule   
by mouth twice   
daily                                   Ursodiol 300 MG Oral   
Capsule TAKE 1 CAPSULE   
BY MOUTH TWICE A DAY   
Quantity: 60 Refills: 0   
Ordered: 2022 DO   
Start : 2022   
Active  
  
  
  
Problems  
Active Problems  
  
  
                                                    Problem   
Classification  Problem         Date            Documented Date Episodic/Chronic  
   
                                                    Acquired foot   
deformities  
(16 sources)                            Acquired deformity of   
toe; Translations:   
[Other hammer toe   
(acquired)]                                                 Chronic  
   
                                                    Acquired foot   
deformities  
(1 source)                              Acquired deformity of   
toe; Translations:   
[Acquired hammertoes   
of both feet]                                               Episodic  
   
                                                    Acute bronchitis  
(12 sources)                            Acute bronchitis;   
Translations: [Acute   
bronchitis]                                                 Episodic  
   
                                                    Anxiety disorders  
(11 sources)                            Anxiety; Translations:   
[Anxiety state,   
unspecified]                                                Chronic  
   
                                                    Diabetes mellitus   
without complication  
(17 sources)                            Hyperglycemia;   
Translations: [Other   
abnormal glucose]                                           Episodic  
   
                                                    Disorders of lipid   
metabolism  
(17 sources)                            Hyperlipidemia;   
Translations: [Other   
and unspecified   
hyperlipidemia]                                             Chronic  
   
                                                    Esophageal disorders  
(17 sources)                            Gastroesophageal   
reflux disease;   
Translations:   
[Esophageal reflux]                                         Chronic  
   
                                                    Essential   
hypertension  
(14 sources)                            Benign essential   
hypertension;   
Translations: [Benign   
essential   
hypertension]                                               Chronic  
   
                                        Comment on above:   Added by Problem Lis  
t Migration; 2013; Moved to   
[facility]   
2013 9:01PM;   
   
                                                    Miscellaneous mental   
health disorders  
(17 sources)                            Primary insomnia;   
Translations:   
[Insomnia]                                                  Chronic  
   
                                                    Osteoarthritis  
(17 sources)                            Osteoarthritis of   
knee; Translations:   
[Osteoarthrosis,   
localized, not   
specified whether   
primary or secondary,   
lower leg]                                                  Chronic  
   
                                                    Other diseases of   
veins and lymphatics  
(17 sources)                            Chronic venous   
hypertension   
(idiopathic) without   
complications of left   
lower extremity;   
Translations: [Edema   
of left lower limb]                                         Chronic  
   
                                                    Other nervous system   
disorders  
(17 sources)                            Carpal tunnel   
syndrome;   
Translations: [Carpal   
tunnel syndrome]                                            Chronic  
   
                                                    Other nervous system   
disorders  
(16 sources)                            Left leg peripheral   
neuropathy;   
Translations:   
[Mononeuritis of lower   
limb, unspecified]                                          Chronic  
   
                                                    Other screening for   
suspected conditions   
(not mental   
disorders or   
infectious disease)  
(16 sources)                            Patient encounter   
status; Translations:   
[Other screening   
mammogram]                                                  Episodic  
   
                                                    Other upper   
respiratory   
infections  
(1 source)                              Acute upper   
respiratory infection;   
Translations: [Acute   
upper respiratory   
infection,   
unspecified]                            Onset:   
2022                                          Episodic  
   
                                                    Retinal detachments;   
defects; vascular   
occlusion; and   
retinopathy  
(17 sources)                            Degenerative disorder   
of macula ;   
Translations: [Macular   
degeneration (senile),   
unspecified]                                                Chronic  
  
  
Past or Other Problems  
  
  
                      Problem Classification Problem    Date       Documented Da  
te Episodic/Chronic  
   
                                                    Hepatitis  
(17 sources)                            Chronic hepatitis   
C; Translations:   
[Chronic hepatitis   
C without mention   
of hepatic coma]                          
Resolved:   
2016                                          Chronic  
   
                                                    Other nervous system   
disorders  
(1 source)                              Left leg   
peripheral   
neuropathy;   
Translations:   
[Neuropathy of   
left foot]                                                    
   
                                                    Other skin disorders  
(17 sources)                            H/O: skin   
disorder;   
Translations:   
[Personal history   
of other   
infectious and   
parasitic   
diseases]                                 
Resolved:   
08-                                          Episodic  
   
                                                    Substance-related   
disorders  
(18 sources)                            Opioid dependence;   
Translations:   
[Sedative   
dependence]                               
Resolved:   
2019                                          Chronic  
   
                                                    Syncope  
(17 sources)                            Vasovagal syncope;   
Translations:   
[Syncope and   
collapse]                                 
Resolved:   
08-                                          Episodic  
   
                                                    NEGATED: Highlighted   
row has not   
occurred!Residual   
codes; unclassified  
(5 sources)     Disease                                         Episodic  
  
  
  
Results  
  
  
                          Test Name    Value        Interpretation Reference   
Range                                   Facility  
   
                                                    AMB GI Physician Progress No  
alycia 2024   
   
                                                    AMB GI Physician   
Progress Note                           [facility]  
:1955  
MRN:[id number]  
FIN:[id number]  
Registration   
Date:2023  
Chief Complaint  
FUV with Weight loss  
  
History of Present Illness  
Patient here today for   
weight loss. PMH includes   
fatty liver and ? PBC. She   
is taking ursodiolol   
daily. She is following   
with Dr. Keith in   
[place]. She has been   
seeing him for fatty   
liver. Patient has tried   
topiramate and addipex in   
the past but was unable to   
tolerate due to side   
effects. She is following   
a low carb diet. she feels   
she has issues with both   
cravings and portion   
sizes. She works nights.   
she reports that she eats   
when she gets home. She   
works from 5-10 PM. she   
works as a . She   
will eat a cereal bar and   
oatmilk with herbal tea.   
she does eat out 1-2 times   
a week. Only drinks herbal   
tea and water and juice.   
She is not sleeping well.   
She is not exercising with   
her knee pain.  
24 hour diet recall  
Breakfast: skips  
Lunch: chicken breast with   
vegetables  
dinner: hamburger with no   
bread and rice  
  
Physical Exam  
Vitals & Measurements  
Systolic Blood Pressure:   
145 mmHg High (23   
10:17:00)  
Diastolic Blood Pressure:   
97 mmHg High (23   
10:17:00)  
Temperature Temporal (F):   
98.8 degF (07/14/23   
10:17:00)  
Peripheral Pulse Rate: 90   
bpm (23 10:17:00)  
Mean Arterial Pressure:   
113 mmHg (23   
10:17:00)  
Height/Length Measured:   
180 cm (23 10::00)  
Weight Measured: 112 kg   
(23 10:17:00)  
Body Mass Index Measured:   
34.57 kg/m2 (23   
10:17:00)  
Weight Measured - lbs2:   
246 lb (07/14/23 10::)  
Height/Length Measured -   
in2: 71 in (23   
10::00)  
Body Mass Index Measured   
English2: 34.31 kg/m2   
(23 10:17:00)  
BSA: 2.36 m2 (23   
10:17:00)  
Ht/Wt Measurement Refused   
by Patient?2: No (23   
10:17:)  
  
Depression Screening   
Scores  
No Depression Screening   
data available for this   
encounter.  
  
Fall Risk Assessment  
No Falls Risk Assessment   
data available for this   
encounter.  
  
  
Medication Reconciliation  
What How Much When   
Instructions  
Unchanged ondansetron   
(Zofran 4 mg oral tablet)   
1 Tabs Oral EVERY EIGHT   
HOURS  
Unchanged semaglutide   
(Wegovy (0.25 mg dose)   
subcutaneous solution)   
0.25 Milligram   
Subcutaneous MONDAY   
Duration: 4 Weeks in the   
abdomen, thigh, or upper   
arm  
Unchanged semaglutide   
(Wegovy (0.5 mg dose)   
subcutaneous solution) 0.5   
Milligram Subcutaneous   
MONDAY Duration: 4 Weeks   
in the abdomen, thigh, or   
upper arm  
Unchanged alirocumab   
(Praluent Pen 75 mg/ mL   
subcutaneous solution) 1   
Milliliter Subcutaneous   
EVERY FOURTEEN DAYS rotate   
injection sites Contact   
prescribing physician if   
questions or concerns  
Unchanged amLODIPine   
(amLODIPine 5 mg oral   
tablet) 1 Tabs Oral DAILY   
Contact prescribing   
physician if questions or   
concerns  
Unchanged chlorthalidone =   
Hygroton (chlorthalidone   
25 mg oral tablet) 1 Tabs   
Oral DAILY Contact   
prescribing physician if   
questions or concerns  
Unchanged potassium   
chloride (KCL) (Klor-Con   
M20 oral tablet, extended   
release) 1 Tabs Oral DAILY   
Contact prescribing   
physician if questions or   
concerns  
Unchanged ursodiol   
(ursodiol 300 mg oral   
capsule) 1 Capsules Oral   
TWICE A DAY Contact   
prescribing physician if   
questions or concerns  
Unchanged vitamin E   
(vitamin E 1000 intl units   
oral capsule) 1 Capsules   
Oral DAILY Contact   
prescribing physician if   
questions or concerns  
  
Assessment/Plan  
This Visit Diagnosis  
1. Obesity E66.9  
Goals for next visit for   
Marc:  
  
1. START Mediterranean   
diet  
2. Try to increase amount   
of exercise to 3-4 times a   
week for at least 30 mins   
at a time  
3. Try to work on aiming   
for 6-8 hours of sleep a   
night  
4. Start WEGOVY  
Patient to follow up in   
4-6 weeks  
2. BMI 34.0-34.9,adult   
Z68.34  
  
Procedure/Surgical History  
Hysterectomy  
Both knee Surgery  
Colonoscopy  
  
Medications  
alirocumab(Praluent Pen 75   
mg/mL subcutaneous   
solution), 75 mg= 1 mL,   
Subcutaneous, [id number]  
amLODIPine(amLODIPine 5 mg   
oral tablet), 5 mg= 1   
tabs, ORAL, DAILY  
chlorthalidone =   
Hygroton(chlorthalidone 25   
mg oral tablet), 25 mg= 1   
tabs, ORAL, DAILY  
ondansetron(Zofran 4 mg   
oral tablet), 4 mg= 1   
tabs, ORAL, [id number]  
potassium chloride   
(KCL)(Klor-Con M20 oral   
tablet, extended release),   
20 mEq= 1 tabs, ORAL,   
DAILY  
semaglutide(Wegovy (0.5 mg   
dose) subcutaneous   
solution), 0.5 mg,   
Subcutaneous, MONDAY  
semaglutide(Wegovy (0.25   
mg dose) subcutaneous   
solution), 0.25 mg,   
Subcutaneous, MONDAY  
ursodiol(ursodiol 300 mg   
oral capsule), 300 mg= 1   
caps, ORAL, BID  
vitamin E(vitamin E 1000   
intl units oral capsule),   
1000 intl_unit= 1 caps,   
ORAL, DAILY  
  
Allergies  
Cipro Dizzy Nausea  
penicillin Hive  
  
Social History  
Alcohol - Denies Alcohol   
Use  
Tobacco - Denies Tobacco   
Use  
  
Family History  
Breast cancer..:   
Grandmother.  
Colon cancer..: Father and   
Grandfather.  
Health Status Family   
Member(s)  
  
Immunizations  
Vaccine Date Status  
SARS-CoV-2 (COVID-19,   
[facility]A) Ad26 vacc   
2021 Recorded  
Comments : 2023:   
TPV60  
zoster vaccine,   
inactivated 10/03/2020   
Recorded  
influenza virus vaccine,   
inact (more content not   
included)...        Normal                                  [facility]  
   
                                                    Phone [address]-   
   
                                        Phone Msg           --------------------  
-  
From: Jessica Nagel MA  
To: JANETTE FLOR CNP;  
Sent: 10/13/2023 12:25:58   
EDT  
Caller Name: MARC DECKER; Caller Number: H   
[phone]  
FYI  
Pt called. Unable to get   
her medication Wegovy.  
She cx her appt. for next   
week.  
She was told from the   
pharmacy to contact our   
office and ask for   
samples.  
Spoke with patient and   
advised that there are no   
samples to provide. And   
that there is a good   
possibility that her   
strength will not be   
available until the new   
year. I did advise that   
she can try to call around   
to different pharmacies to   
see if they might have any   
on hand.  
Patient calling states she   
is still unable to find   
Wegovy. Asking if she can   
be prescribed a different   
medication.  
Electronically Signed by:  
Brianne Brenner MA  
on 2023 10:32 EST Normal                                  [facility]  
   
                                                    Phone [address]-   
   
                                        Phone Msg           --------------------  
-  
From: Laxmi Navas  
To: JANETTE FLOR CNP;  
Sent: 2023 16:28:26   
EDT  
Caller Name: MARC DECKER; Caller Number: h [phone]  
FYI Pt called she finally   
was able to get her Wegovy   
from [facility]. She states   
that you ordered her   
enough for a month, but   
she has to pay the same   
co-pay for one month as   
she would have to pay for   
a 90 supply. I tried to   
tell her the dose usually   
changes from month to   
month but she wanted me to   
let you know  
fuv 10/18, will discuss   
then                Normal                                  [facility]  
   
                                                    Phone Msgon 2023   
   
                                        Phone Msg           --------------------  
-  
From: Little Avery MA  
To: JANETTE FLOR CNP;  
Sent: 2023 15:50:07   
EDT  
Subject: Med Management  
Caller Name: MARC DECKER; Caller Number: H   
[phone]  
** On hold pending   
signature **  
Order:semaglutide (Wegovy   
(0.5 mg dose) subcutaneous   
solution) 0.5 mg   
Subcutaneous MONDAY in the   
abdomen, thigh, or upper   
arm  
Qty: 2 mL Duration: 4   
weeks Refills: 0  
Substitutions Allowed   
Route To Pharmacy -   
[id number]  
  
Marc is requesting her   
next dose of the Wegovy   
0.5  
** Approved **  
Order:semaglutide (Wegovy   
(0.5 mg dose) subcutaneous   
solution) 0.5 mg   
Subcutaneous MONDAY in the   
abdomen, thigh, or upper   
arm  
Qty: 2 mL Duration: 4   
weeks Refills: 0  
Substitutions Allowed   
Route To Pharmacy -   
[id number]  
Signed by JANETTE FLOR CNP 2023 15:51:00   
EDT                 Normal                                  [facility]  
   
                                                    Phone Msgon 2023   
   
                                        Phone Msg           --------------------  
-  
From: Laxmi Navas  
To: JANETTE FLOR CNP;   
Brianne Brenner MA;  
Sent: 2023 13:39:40   
EDT  
Caller Name: MARC DECKER; Caller Number: H   
[phone]  
Pt phoned she just took   
her first injection of   
Wegovy and she was very   
nauseous after it. She   
would like something for   
the nausea.  
---------------------  
From: JANETTE FLOR CNP  
To: Laxmi Navas;  
Sent: 2023 16:13:13   
EDT  
Subject: RE:  
Caller Name: MARC DECKER; Caller Number: H   
[phone]  
sent in RX for zofran to   
pharmacy. Please advise   
patient. Thanks! Janette  
Called pt no answer LM Normal                                  [facility]  
   
                                                    MRI SPINE CERVICAL W/O CONTR  
Amanda 2022   
   
                                                    MRI SPINE CERVICAL   
W/O CONTRAST                            ORIGINAL  
EXAMINATION:  
MRI cervical spine without   
intravenous contrast.  
  
HISTORY:  
ORDERING SYSTEM PROVIDED   
HISTORY:  
Reason for Exam: M54.2,   
M99.01. Cervicalgia,   
segmental and somatic  
dysfunction of cervical   
region. Numbness in front   
of neck and hands.  
  
TECHNIQUE:  
Multiplanar multisequence   
MRI of the cervical spine   
was performed without the  
administration of   
intravenous contrast.  
  
MRI examination of the   
cervical spine was   
obtained utilizing the   
sequences:  
Sagittal T1 weighted, and   
axial T2 weighted and   
axial GRE images.  
  
COMPARISON:  
None.  
  
FINDINGS:  
Alignment: Straightening   
of the physiologic   
cervical lordosis. Minimal  
degenerative   
anterolisthesis of C3 on   
C4 and C7 on T1. Minimal  
retrolisthesis of C5 on C6   
and C6 on C7.  
  
Vertebrae: No fracture or   
vertebral body height   
loss. Multilevel  
degenerative vertebral   
body height loss and   
endplate disc osteophyte  
complexes most prominent   
at C5 and C6. Mild facet   
and uncovertebral joint  
arthropathy.  
  
Spinal cord: No abnormal   
signal changes.  
  
Discs: Multilevel disc   
height loss.  
  
C2-C3: No stenosis.  
  
C3-C4: Moderate spinal   
canal stenosis. Moderate   
bilateral foraminal   
stenosis.  
  
C4-C5: Mild spinal canal   
stenosis. Moderate   
bilateral foraminal   
stenosis.  
  
C5-C6 : Superimposed left   
central/subarticular   
extrusion results in   
moderate  
spinal canal stenosis.   
Moderate to severe right   
and moderate left   
stenosis.  
  
C6-C7: Moderate spinal   
canal stenosis. Moderate   
to severe right and   
moderate  
left stenosis.  
  
C7-T1 : Mild spinal canal   
and bilateral foraminal   
stenosis.  
  
Paraspinal soft tissues:   
Mild cervicothoracic   
paraspinal atrophy on the   
left.  
  
Visualized posterior   
fossa: Slight contouring   
of the left ventral   
medulla  
secondary to the left   
vertebral artery flow   
void. No significant  
abnormality. Minimal   
opacification of bilateral   
mastoid air cells.  
  
Visualized neck: No   
significant abnormality.   
Specifically, major   
vascular T2  
flow voids are maintained.  
  
IMPRESSION:  
Moderate degenerative   
spinal canal stenosis   
C3-C4, C5-C6, and C6-C7.  
  
Foraminal stenosis   
(moderate bilateral C3-4   
and C4-5, moderate to   
severe  
right and moderate left   
C5-6 and C6-7).  
  
I have personally reviewed   
the images of this   
examination and agree with   
the  
resident's findings and   
interpretation.  
  
Interpreted by: Marcos Marie  
Preliminary Report By:   
Ulices Woodard  
Electronically signed By   
Marcos Marie  
Dictated Date: 2022   
9:54:34 AM  
Prelim Date: 2022   
5:22:42 PM  
Sign Date: 2022   
5:22:42 PM  
Ordering Provider: DONY Mccoy                                  [facility]   
([place])  
   
                                                    .Auto Diffon 2022   
   
                      Basophil, Absolute 0.0 10 3/mcL Normal     0.0-0.2    [facility]   
([place])  
   
                                        Comment on above:   Performed By: #### G  
FR, CMP, PBNP, TROPHS ####  
[address] [address]   
   
                                                    Basophils/100 WBC   
(Bld)           0.6 %           Normal          0.0-2.5         [facility]   
([place])  
   
                                        Comment on above:   Performed By: #### G  
FR, CMP, PBNP, TROPHS ####  
[address] [address]   
   
                      Eosinophil, Absolute 0.2 10 3/mcL Normal     0.0-0.4    [facility]   
([place])  
   
                                        Comment on above:   Performed By: #### G  
FR, CMP, PBNP, TROPHS ####  
[address] [address]   
   
                                                    Eosinophils/100 WBC   
(Bld)           2.7 %           Normal          0.0-7.0         [facility]   
([place])  
   
                                        Comment on above:   Performed By: #### G  
FR, CMP, PBNP, TROPHS ####  
[address] [address]   
   
                      Lymphocyte, Absolute 2.2 10 3/mcL Normal     0.8-3.9    [facility]   
([place])  
   
                                        Comment on above:   Performed By: #### G  
FR, CMP, PBNP, TROPHS ####  
[address] [address]   
   
                                                    Lymphocytes/100 WBC   
(Bld)           26.8 %          Normal          10.0-50.0       [facility]   
([place])  
   
                                        Comment on above:   Performed By: #### G  
FR, CMP, PBNP, TROPHS ####  
[address] [address]   
   
                      Monocyte, Absolute 0.6 10 3/mcL Normal     0.2-1.0    [facility]   
([place])  
   
                                        Comment on above:   Performed By: #### G  
FR, CMP, PBNP, TROPHS ####  
[address] [address]   
   
                                                    Monocytes/100 WBC   
(Bld)           6.9 %           Normal          1.7-13.0        [facility]   
([place])  
   
                                        Comment on above:   Performed By: #### G  
FR, CMP, PBNP, TROPHS ####  
[address] [address]   
   
                                                    Neutrophils/100 WBC   
(Bld)           63.0 %          Normal          37.0-80.0       [facility]   
([place])  
   
                                        Comment on above:   Performed By: #### G  
FR, CMP, PBNP, TROPHS ####  
[address] [address]   
   
                                                    .GFRon 2022   
   
                                                    GFR Non-   
American        66 ml/min/1.73sqm Normal                          [facility]   
([place])  
   
                                        Comment on above:   Result Comment:  
GFR Population mean for , Non- Americans  
Ages 20-29 = 116 mL/min/1.73 sq.m.  
Ages 30-39 = 107 mL/min/1.73 sq.m.  
Ages 40-49 = 99 mL/min/1.73 sq.m.  
Ages 50-59 = 93 mL/min/1.73 sq.m.  
Ages 60-69 = 85 mL/min/1.73 sq.m.  
Ages 70+ = 75 mL/min/1.73 sq.m.  
Chronic Kidney Disease: Less than 60 mL/min/1.73 square meters  
End Stage Renal Disease: Less than 15 mL/min/1.73 square meters   
   
                                                            Performed By: #### G  
FR, CMP, PBNP, TROPHS ####[address]   
[place], [place] [address]   
   
                      GFR African American 80 ml/min/1.73sqm Normal               
   [facility]   
([place])  
   
                                        Comment on above:   Result Comment:  
GFR Population mean for , Non- Americans  
Ages 20-29 = 116 mL/min/1.73 sq.m.  
Ages 30-39 = 107 mL/min/1.73 sq.m.  
Ages 40-49 = 99 mL/min/1.73 sq.m.  
Ages 50-59 = 93 mL/min/1.73 sq.m.  
Ages 60-69 = 85 mL/min/1.73 sq.m.  
Ages 70+ = 75 mL/min/1.73 sq.m.  
Chronic Kidney Disease: Less than 60 mL/min/1.73 square meters  
End Stage Renal Disease: Less than 15 mL/min/1.73 square meters   
   
                                                            Performed By: #### SUGAR  
FR, CMP, PBNP, TROPHS ####[address]   
[place], [place] [address]   
   
                                                    .MDWon 2022   
   
                                                    Monocyte   
Distribution Width 20.92           High            0.00-20.00      [facility]   
([place])  
   
                                        Comment on above:   Result Comment: For   
adults in ED, MDW>20.0 may be associated   
with   
a higher risk of sepsis during the first 12hrs of hospital   
admission   
   
                                                            Performed By: #### SUGAR  
FR, CMP, PBNP, TROPHS ####  
[address] [address]   
   
                                                    .NEUABSon 2022   
   
                      Neutrophil, Absolute 5.1 10 3/mcL Normal     2.9-6.2    [facility]   
([place])  
   
                                        Comment on above:   Performed By: #### SUGAR  
FR, CMP, PBNP, TROPHS ####  
[id number] [place], [place] [address]   
   
                                                    CBCon 2022   
   
                                                    Erythrocyte   
distribution width   
(RBC) [Ratio]   12.7 %          Normal          11.5-14.5       [facility]   
([place])  
   
                                        Comment on above:   Performed By: #### SUGAR  
FR, CMP, PBNP, TROPHS ####  
[address] [address]   
   
                                                    Hematocrit (Bld)   
[Volume fraction] 44.0 %          Normal          37.0-47.0       [facility]   
([place])  
   
                                        Comment on above:   Performed By: #### G  
FR, CMP, PBNP, TROPHS ####  
[address] [address]   
   
                      Hgb        15.4 G/dL  Normal     12.0-16.0  [facility]   
([place]  
   
                                        Comment on above:   Performed By: #### G  
FR, CMP, PBNP, TROPHS ####  
[address] [address]   
   
                                                    MCH (RBC) [Entitic   
mass]           30.1 pg         Normal          27.0-31.2       [facility]   
([place]  
   
                                        Comment on above:   Performed By: #### G  
FR, CMP, PBNP, TROPHS ####  
[address] [address]   
   
                      MCHC       35.0 G/dL  Normal     33.0-37.0  [facility]   
([place]  
   
                                        Comment on above:   Performed By: #### G  
FR, CMP, PBNP, TROPHS ####  
[address] [address]   
   
                                                    MCV (RBC) [Entitic   
vol]            86.1 fL         Normal          80.0-94.0       [facility]   
([place]  
   
                                        Comment on above:   Performed By: #### G  
FR, CMP, PBNP, TROPHS ####  
[address] [address]   
   
                      Platelet   271 10 3/mcL Normal     130-400    [facility]   
([place]  
   
                                        Comment on above:   Performed By: #### G  
FR, CMP, PBNP, TROPHS ####  
[address] [address]   
   
                                                    Platelet mean volume   
(Bld) [Entitic vol] 7.7 fL          Normal          7.4-10.4        [facility]   
([place]  
   
                                        Comment on above:   Performed By: #### G  
FR, CMP, PBNP, TROPHS ####  
[address] [address]   
   
                      RBC        5.11 10 6/mcL Normal     4.20-5.40  [facility]   
([place]  
   
                                        Comment on above:   Performed By: #### G  
FR, CMP, PBNP, TROPHS ####  
[address] [address]   
   
                      WBC        8.1 10 3/mcL Normal     4.6-10.8   [facility]   
([place]  
   
                                        Comment on above:   Performed By: #### G  
FR, CMP, PBNP, TROPHS ####  
[address] [address]   
   
                                                    CMPon 2022   
   
                      Albumin Level 3.3 G/dL   Low        3.4-4.8    [facility]   
([place]  
   
                                        Comment on above:   Performed By: #### G  
FR, CMP, PBNP, TROPHS ####  
[address] [address]   
   
                                                    Albumin/Globulin   
[Mass ratio]    0.9 {ratio}     Low             1.1-2.5         [facility]   
([place]  
   
                                        Comment on above:   Performed By: #### G  
FR, CMP, PBNP, TROPHS ####  
[address] [address]   
   
                                                    ALP [Catalytic   
activity/Vol]   103 U/L         Normal                    [facility]   
([place]  
   
                                        Comment on above:   Performed By: #### G  
FR, CMP, PBNP, TROPHS ####  
[address] [address]   
   
                                                    ALT [Catalytic   
activity/Vol]   38 U/L          Normal          14-59           [facility]   
([place]  
   
                                        Comment on above:   Performed By: #### G  
FR, CMP, PBNP, TROPHS ####  
[address] [address]   
   
                                                    AST [Catalytic   
activity/Vol]   24 U/L          Normal          10-40           [facility]   
([place]  
   
                                        Comment on above:   Performed By: #### G  
FR, CMP, PBNP, TROPHS ####  
[address] [address]   
   
                      Bili Total 0.3 mg/dL  Normal     0.2-1.0    [facility]   
([place]  
   
                                        Comment on above:   Result Comment: Use   
of this assay is not recommended for   
patients   
undergoing  
treatment with eltrombopag due to the potential for falsely   
elevated  
results.   
   
                                                            Performed By: #### G  
FR, CMP, PBNP, TROPHS ####  
[address] [address]   
   
                      BUN/Creatinine Ratio 13 ratio   Normal     7-27       [facility]   
([place])  
   
                                        Comment on above:   Performed By: #### G  
FR, CMP, PBNP, TROPHS ####  
[address] [address]   
   
                      Calcium [Mass/Vol] 9.2 mg/dL  Normal     8.4-10.2   [facility]   
[place]  
   
                                        Comment on above:   Performed By: #### SUGAR  
FR, CMP, PBNP, TROPHS ####  
[address] [address]   
   
                      Chloride [Moles/Vol] 102 mmol/L Normal          [facility]   
([place]  
   
                                        Comment on above:   Performed By: #### SUGAR  
FR, CMP, PBNP, TROPHS ####  
[address] [address]   
   
                      CO2 [Moles/Vol] 32 mmol/L  High       23-31      [facility]   
([place]  
   
                                        Comment on above:   Performed By: #### SUGAR  
FR, CMP, PBNP, TROPHS ####  
[address] [address]   
   
                                                    Creatinine   
[Mass/Vol]      0.86 mg/dL      Normal          0.55-1.02       [facility]   
([place]  
   
                                        Comment on above:   Performed By: #### G  
FR, CMP, PBNP, TROPHS ####  
[address] [address]   
   
                      Electrolyte Balance 9.0 mEq/L  Normal     4.0-15.0   [facility]   
([place])  
   
                                        Comment on above:   Performed By: #### G  
FR, CMP, PBNP, TROPHS ####  
[address] [address]   
   
                      Globulin   3.5 G/dL   Normal                [facility]   
([place]  
   
                                        Comment on above:   Performed By: #### G  
FR, CMP, PBNP, TROPHS ####  
[address] [address]   
   
                      Glucose [Mass/Vol] 149 mg/dL  High            [facility]   
[place]  
   
                                        Comment on above:   Performed By: #### G  
FR, CMP, PBNP, TROPHS ####  
[address] [address]   
   
                                                    Potassium   
[Moles/Vol]     3.8 mmol/L      Normal          3.5-5.1         [facility]   
([place]  
   
                                        Comment on above:   Performed By: #### G  
FR, CMP, PBNP, TROPHS ####  
[address] [address]   
   
                      Sodium [Moles/Vol] 143 mmol/L Normal     136-145    [facility]   
[place]  
   
                                        Comment on above:   Performed By: #### G  
FR, CMP, PBNP, TROPHS ####  
[address] [address]   
   
                      Total Protein 6.8 G/dL   Normal     6.4-8.2    [facility]   
([place]  
   
                                        Comment on above:   Performed By: #### G  
FR, CMP, PBNP, TROPHS ####  
[address] [address]   
   
                                                    Urea nitrogen   
[Mass/Vol]      11 mg/dL        Normal          7-18            [facility]   
([place]  
   
                                        Comment on above:   Performed By: #### G  
FR, CMP, PBNP, TROPHS ####  
[address] [address]   
   
                                                    [id number] 2022   
   
                                                    SARS-CoV-2   
(COVID-19) RNA   
ADRYAN+probe Ql (Unsp   
spec)           Negative        Normal          Negative        [facility]   
([place]  
   
                                        Comment on above:   Performed By: #### C  
OVD19, FLURSV ####  
[address] [address]   
   
                                                    SARS-CoV-2   
(COVID-19) RNA   
ADRYAN+probe Ql (Unsp   
spec)                           Normal                          [facility]   
([place]  
   
                                        Comment on above:   Result Comment: Nega  
tive results do not preclude SARS-CoV-2   
infection and should not be used as the sole basis for patient   
management decisions. Negative results must be combined with   
clinical observations, patient history, and epidemiological   
information.  
There is a risk of false negative values resulting from   
improperly collected, transported, or handled specimens.  
There is a risk of false negative values due to the presence of   
sequence variants in the pathogen targets of the assay,   
procedural errors, amplification inhibitors in specimens, or   
inadequate numbers of organisms for amplification.  
[facility] SARS-CoV-2 Assay is a Real-Time reverse-transcriptase   
polymerase chain reaction (RT-PCR) based qualitative in vitro   
diagnostic test intended for the qualitative detection of nucleic   
acid from the SARS-CoV-2 in nasopharyngeal swab specimens   
collected from individuals suspected of COVID-19 by their   
healthcare provider. Testing is limited to laboratories certified   
under the Clinical Laboratory Improvement Amendments of 1988   
(CLIA), 42 U.S.C. ?263a, to perform moderate and high complexity   
tests.  
COVID-19 Int   
   
                                                            Performed By: #### C  
OVD19, FLURSV ####  
[address] [address]   
   
                                                    DIMERon 2022   
   
                      D-Dimer    223 ng/mL D-DU Normal     0-230      [facility]   
([place]  
   
                                        Comment on above:   Order Comment: hemol  
yzed   
   
                                                            Result Comment: The   
result of the D-Dimer test should be   
evaluated in the  
context of all the clinical and laboratory data available.  
In those instances where the laboratory result does not  
agree with the clinical evaluation, additional tests should  
be performed accordingly.  
If the D-Dimer result is used to exclude DVT or PE, the  
recommended cutoff value is less than 230 ng/mL. The D-Dimer  
result should not be used alone to rule in DVT/PE, but should  
be used in conjunction with a clinical pretest probability  
(PTP)assessment model to exclude venous thromboembolism (VTE)  
in outpatients suspected of deep venous thrombosis (DVT) and  
pulmonary embolism (PE).   
   
                                                            Performed By: #### D  
NISREEN ####  
[address] [address]   
   
                                                    FLURSVon 2022   
   
                      Flu A PCR (AO) Negative   Normal     Negative   [facility]   
([place]  
   
                                        Comment on above:   Result Comment: Posi  
tive Results: Positive Flu A/B or RSV for   
by   
PCR. Positive test results do not rule out bacterial infection or   
co-infection with other pathogens. Test results should be   
interpreted in conjunction with other laboratory and clinical   
data.  
Negative Results: Negative for by PCR. Negative test results do   
not preclude influenza virus or RSV infection and should not be   
used as the sole basis for diagnosis, treatment, or other   
management decisions. There is a risk of false negative RSV   
results when at low concentration and in the presence of   
co-infection with high concentration of influenza A.  
Invalid Results: An Invalid result (INV) was obtained. The test   
was repeated with similar results. REPEAT COLLECTION AND TESTING   
IS RECOMMENDED.  
The [facility] Flu A/B & RSV Assay is a real-time polymerase chain   
reaction (PCR) based qualitative in vitro diagnostic test for the   
direct detection and differentiation of influenza A virus,   
influenza B virus, and respiratory syncytial virus (RSV) nucleic   
acid in nasopharyngeal swab (NPS) specimens from patients with   
signs and symptoms of respiratory infection in conjunction with   
clinical and laboratory findings. The test is intended for use as   
an aid in the differential diagnosis of influenza A virus,   
influenza B virus, and RSV in humans and is not intended to   
detect influenza C.   
   
                                                            Performed By: #### C  
OVD19, FLURSV ####  
[address] [place], [place] [address]   
   
                      Flu B PCR (AO) Negative   Normal     Negative   [facility]   
([place]  
   
                                        Comment on above:   Result Comment: Posi  
tive Results: Positive Flu A/B or RSV for   
by   
PCR. Positive test results do not rule out bacterial infection or   
co-infection with other pathogens. Test results should be   
interpreted in conjunction with other laboratory and clinical   
data.  
Negative Results: Negative for by PCR. Negative test results do   
not preclude influenza virus or RSV infection and should not be   
used as the sole basis for diagnosis, treatment, or other   
management decisions. There is a risk of false negative RSV   
results when at low concentration and in the presence of   
co-infection with high concentration of influenza A.  
Invalid Results: An Invalid result (INV) was obtained. The test   
was repeated with similar results. REPEAT COLLECTION AND TESTING   
IS RECOMMENDED.  
The [facility] Flu A/B & RSV Assay is a real-time polymerase chain   
reaction (PCR) based qualitative in vitro diagnostic test for the   
direct detection and differentiation of influenza A virus,   
influenza B virus, and respiratory syncytial virus (RSV) nucleic   
acid in nasopharyngeal swab (NPS) specimens from patients with   
signs and symptoms of respiratory infection in conjunction with   
clinical and laboratory findings. The test is intended for use as   
an aid in the differential diagnosis of influenza A virus,   
influenza B virus, and RSV in humans and is not intended to   
detect influenza C.   
   
                                                            Performed By: #### C  
OVD19, FLURSV ####  
[address] [place], [place] [address]   
   
                      RSV PCR (AO) Negative   Normal     Negative   [facility]   
([place]  
   
                                        Comment on above:   Result Comment: Posi  
tive Results: Positive Flu A/B or RSV for   
by   
PCR. Positive test results do not rule out bacterial infection or   
co-infection with other pathogens. Test results should be   
interpreted in conjunction with other laboratory and clinical   
data.  
Negative Results: Negative for by PCR. Negative test results do   
not preclude influenza virus or RSV infection and should not be   
used as the sole basis for diagnosis, treatment, or other   
management decisions. There is a risk of false negative RSV   
results when at low concentration and in the presence of   
co-infection with high concentration of influenza A.  
Invalid Results: An Invalid result (INV) was obtained. The test   
was repeated with similar results. REPEAT COLLECTION AND TESTING   
IS RECOMMENDED.  
The [facility] Flu A/B & RSV Assay is a real-time polymerase chain   
reaction (PCR) based qualitative in vitro diagnostic test for the   
direct detection and differentiation of influenza A virus,   
influenza B virus, and respiratory syncytial virus (RSV) nucleic   
acid in nasopharyngeal swab (NPS) specimens from patients with   
signs and symptoms of respiratory infection in conjunction with   
clinical and laboratory findings. The test is intended for use as   
an aid in the differential diagnosis of influenza A virus,   
influenza B virus, and RSV in humans and is not intended to   
detect influenza C.   
   
                                                            Performed By: #### C  
OVD19, FLURSV ####  
[address] [place], [place] [address]   
   
                                                    LABORATORYOrdered By: Jennifer Osuna on 2022   
   
                                                    Fibrin D-dimer DDU   
(PPP) [Mass/Vol]          223 ng/mL D-DU            Invalid   
Interpretation   
Code                                    0 - 230   
ng/mL D-DU                              AO Coag SS  
   
                                                    LABORATORYOrdered By: SYSTEM  
 SYSTEM on 2022   
   
                                                    Albumin BCP dye   
[Mass/Vol]                3.3 G/dL                  Invalid   
Interpretation   
Code                                    3.4 - 4.8   
G/dL                                    AO ADM SS  
   
                                                    Albumin/Globulin   
[Mass ratio]              0.9 {ratio}               Invalid   
Interpretation   
Code                                    1.1 - 2.5   
ratio                                   AO ADM SS  
   
                                                    ALP [Catalytic   
activity/Vol]             103 U/L                   Invalid   
Interpretation   
Code                                    40 - 135   
U/L                                     AO ADM SS  
   
                                                    ALT With P-5'-P   
[Catalytic   
activity/Vol]             38 U/L                    Invalid   
Interpretation   
Code                      14 - 59 U/L               AO ADM SS  
   
                                                    AST With P-5'-P   
[Catalytic   
activity/Vol]             24 U/L                    Invalid   
Interpretation   
Code                      10 - 40 U/L               AO ADM SS  
   
                                Bilirubin [Mass/Vol] 0.3 mg/dL       Invalid   
Interpretation   
Code                                    0.2 - 1.0   
mg/dL                                   AO ADM SS  
   
                                Calcium [Mass/Vol] 9.2 mg/dL       Invalid   
Interpretation   
Code                                    8.4 - 10.2   
mg/dL                                   AO ADM SS  
   
                                Chloride [Moles/Vol] 102 mmol/L      Invalid   
Interpretation   
Code                                    98 - 107   
mmol/L                                  AO ADM SS  
   
                                CO2 [Moles/Vol] 32 mmol/L       Invalid   
Interpretation   
Code                                    23 - 31   
mmol/L                                  AO ADM SS  
   
                                                    Creatinine   
[Mass/Vol]                0.86 mg/dL                Invalid   
Interpretation   
Code                                    0.55 - 1.02   
mg/dL                                   AO ADM SS  
   
                                Electrolyte Balance 9.0 mEq/L       Invalid   
Interpretation   
Code                                    4.0 - 15.0   
mEq/L                                   AO ADM SS  
   
                                GFR African American 80 ml/min/1.73sqm Invalid   
Interpretation   
Code                                                AO Chemistry   
S  
   
                                                    GFR Non-   
American                  66 ml/min/1.73sqm         Invalid   
Interpretation   
Code                                                AO Chemistry   
S  
   
                                Globulin        3.5 G/dL        Invalid   
Interpretation   
Code                                                AO ADM SS  
   
                                Glucose [Mass/Vol] 149 mg/dL       Invalid   
Interpretation   
Code                                    80 - 115   
mg/dL                                   AO ADM SS  
   
                                                    Natriuretic   
peptide.B prohormone   
N-Terminal   
[Mass/Vol]                56 pg/mL                  Invalid   
Interpretation   
Code                                    0 - 125   
pg/mL                                   AO ADM SS  
   
                                                    Potassium   
[Moles/Vol]               3.8 mmol/L                Invalid   
Interpretation   
Code                                    3.5 - 5.1   
mmol/L                                  AO ADM SS  
   
                                Protein [Mass/Vol] 6.8 G/dL        Invalid   
Interpretation   
Code                                    6.4 - 8.2   
G/dL                                    AO ADM SS  
   
                                Sodium [Moles/Vol] 143 mmol/L      Invalid   
Interpretation   
Code                                    136 - 145   
mmol/L                                  AO ADM SS  
   
                                                    Troponin I.cardiac   
DL <= 0.01 ng/mL   
[Mass/Vol]                5.4 ng/L                  Invalid   
Interpretation   
Code                                    0.0 - 51.4   
ng/L                                    AO ADM SS  
   
                                                    Urea nitrogen   
[Mass/Vol]                11 mg/dL                  Invalid   
Interpretation   
Code                                    7 - 18   
mg/dL                                   AO ADM SS  
   
                                                    Urea   
nitrogen/Creatinine   
[Mass ratio]              13 ratio                  Invalid   
Interpretation   
Code                                    7 - 27   
ratio                                   AO ADM SS  
   
                                                    LABORATORYOrdered By: Jaylin Martinez on 2022   
   
                                Basophil, Absolute 0.0 103/mcL     Invalid   
Interpretation   
Code                                    0.0 - 0.2   
10^3/mcL                                AO Workflow   
SS  
   
                                                    Basophils/100 WBC   
(Bld)                     0.6 %                     Invalid   
Interpretation   
Code                      0.0 - 2.5 %               AO Workflow   
SS  
   
                                Eosinophil, Absolute 0.2 103/mcL     Invalid   
Interpretation   
Code                                    0.0 - 0.4   
10^3/mcL                                AO Workflow   
SS  
   
                                                    Eosinophils/100 WBC   
(Bld)                     2.7 %                     Invalid   
Interpretation   
Code                      0.0 - 7.0 %               AO Workflow   
SS  
   
                                                    Erythrocyte   
distribution width   
(RBC) [Ratio]             12.7 %                    Invalid   
Interpretation   
Code                                    11.5 - 14.5   
%                                       AO Workflow   
SS  
   
                                                    Hematocrit (Bld)   
[Volume fraction]         44.0 %                    Invalid   
Interpretation   
Code                                    37.0 - 47.0   
%                                       AO Workflow   
SS  
   
                                                    Hemoglobin (Bld)   
[Mass/Vol]                15.4 G/dL                 Invalid   
Interpretation   
Code                                    12.0 - 16.0   
G/dL                                    AO Workflow   
SS  
   
                                Lymphocyte, Absolute 2.2 103/mcL     Invalid   
Interpretation   
Code                                    0.8 - 3.9   
10^3/mcL                                AO Workflow   
SS  
   
                                                    Lymphocytes/100 WBC   
(Bld)                     26.8 %                    Invalid   
Interpretation   
Code                                    10.0 - 50.0   
%                                       AO Workflow   
SS  
   
                                                    MCH (RBC) [Entitic   
mass]                     30.1 pg                   Invalid   
Interpretation   
Code                                    27.0 - 31.2   
pg                                      AO Workflow   
SS  
   
                                MCHC            35.0 G/dL       Invalid   
Interpretation   
Code                                    33.0 - 37.0   
G/dL                                    AO Workflow   
SS  
   
                                                    MCV (RBC) [Entitic   
vol]                      86.1 fL                   Invalid   
Interpretation   
Code                                    80.0 - 94.0   
fL                                      AO Workflow   
SS  
   
                                                    Monocyte   
distribution width   
Auto (Bld) [Entitic   
vol]                      20.92                     Invalid   
Interpretation   
Code                                    0.00 -   
20.00                                   AO Workflow   
SS  
   
                                        Comment on above:   Result Comment: For   
adults in ED, MDW>20.0 may be associated   
with   
a higher risk of sepsis during the first 12hrs of hospital   
admission   
   
                                Monocyte, Absolute 0.6 103/mcL     Invalid   
Interpretation   
Code                                    0.2 - 1.0   
10^3/mcL                                AO Workflow   
SS  
   
                                                    Monocytes/100 WBC   
(Bld)                     6.9 %                     Invalid   
Interpretation   
Code                                    1.7 - 13.0   
%                                       AO Workflow   
SS  
   
                                Neutrophil, Absolute 5.1 103/mcL     Invalid   
Interpretation   
Code                                    2.9 - 6.2   
10^3/mcL                                AO Workflow   
SS  
   
                                                    Neutrophils/100 WBC   
(Bld)                     63.0 %                    Invalid   
Interpretation   
Code                                    37.0 - 80.0   
%                                       AO Workflow   
SS  
   
                                                    Platelet mean volume   
(Bld) [Entitic vol]       7.7 fL                    Invalid   
Interpretation   
Code                                    7.4 - 10.4   
fL                                      AO Workflow   
SS  
   
                                                    Platelets (Bld)   
[#/Vol]                   271 103/mcL               Invalid   
Interpretation   
Code                                    130 - 400   
10^3/mcL                                AO Workflow   
SS  
   
                                RBC (Bld) [#/Vol] 5.11 106/mcL    Invalid   
Interpretation   
Code                                    4.20 - 5.40   
10^6/mcL                                AO Workflow   
SS  
   
                                WBC (Bld) [#/Vol] 8.1 103/mcL     Invalid   
Interpretation   
Code                                    4.6 - 10.8   
10^3/mcL                                AO Workflow   
SS  
   
                                                    LABORATORYOrdered By: Olga Siddiqui on 2022   
   
                                                    FLUAV RNA ADRYAN+probe   
Ql (Upper resp)                         Negative  
(22 5:55 PM)                      Invalid   
Interpretation   
Code                      Negative                  AO Auto   
Urine SS  
   
                                                    FLUBV RNA ADRYAN+probe   
Ql (Upper resp)                         Negative  
(22 5:55 PM)                      Invalid   
Interpretation   
Code                      Negative                  AO Auto   
Urine SS  
   
                                                    RSV RNA ADRYAN+probe Ql   
(Upper resp)                            Negative  
(22 5:55 PM)                      Invalid   
Interpretation   
Code                      Negative                  AO Auto   
Urine SS  
   
                                                    SARS-CoV-2   
(COVID-19) RNA   
ADRYAN+probe Ql (Resp)                     Negative results do not   
preclude SARS-CoV-2   
infection and should not   
be used as the sole basis   
for patient management   
decisions. Negative   
results must be combined   
with clinical   
observations, patient   
history, and   
epidemiological   
information.There is a   
risk of false negative   
values resulting from   
improperly collected,   
transported, or handled   
specimens.There is a risk   
of false negative values   
due to the presence of   
sequence variants in the   
pathogen targets of the   
assay, procedural errors,   
amplification inhibitors   
in specimens, or   
inadequate numbers of   
organisms for   
amplification.GUANAKO   
SARS-CoV-2 Assay is a   
Real-Time   
reverse-transcriptase   
polymerase chain reaction   
(RT-PCR) based qualitative   
in vitro diagnostic test   
intended for the   
qualitative detection of   
nucleic acid from the   
SARS-CoV-2 in   
nasopharyngeal swab   
specimens collected from   
individuals suspected of   
COVID-19 by their   
healthcare provider.   
Testing is limited to   
laboratories certified   
under the Clinical   
Laboratory Improvement   
Amendments of 1988 (CLIA),   
42 U.S.C. 263a, to perform   
moderate and high   
complexity tests.                       Invalid   
Interpretation   
Code                                                AO Auto   
Urine SS  
   
                                                    PBNPon 2022   
   
                                                    Natriuretic peptide   
B (Bld) [Mass/Vol] 56 pg/mL        Normal          0-125           [facility]   
([place])  
   
                                        Comment on above:   Result Comment: NT-p  
roBNP results of less than 300 pg/mL   
effectively  
rules out acute congestive heart failure with 99%  
negative predictive value.   
   
                                                            Performed By: #### G  
FR, CMP, PBNP, TROPHS ####[id number]   
[place], [place] [address]   
   
                                                    TROPHSon 2022   
   
                                                    Troponin I High   
Sensitivity     5.4 ng/L        Normal          0.0-51.4        [facility]   
([place])  
   
                                        Comment on above:   Performed By: #### G  
FR, CMP, PBNP, TROPHS ####[address]   
[id number]   
[place], [place] [address]   
   
                                                    XR CHEST 1 VIEWon 2022  
   
   
                                        XR CHEST 1 VIEW     ORIGINAL  
EXAMINATION:  
ONE XRAY VIEW OF THE CHEST  
  
2022 6:29 pm  
  
COMPARISON:  
None.  
  
HISTORY:  
ORDERING SYSTEM PROVIDED   
HISTORY:  
Reason for Exam: chest   
pain  
  
FINDINGS:  
Normal cardiomediastinal   
contours. There is   
elevation of the right  
hemidiaphragm with right   
basilar atelectasis versus   
airspace disease. No  
vascular congestion,   
pleural effusion, or   
pneumothorax. There are  
degenerative changes   
within the shoulders and   
spine.  
  
IMPRESSION:  
Elevation of the right   
hemidiaphragm with right   
basilar atelectasis versus  
airspace disease.  
  
I have personally reviewed   
the images of this   
examination and agree with   
the  
resident's findings and   
interpretation.  
  
Interpreted by: Osvaldo Alejandra  
Preliminary Report By:   
Madison Choudhary  
Electronically signed By   
Osvaldo Alejandra  
Dictated Date: 2022   
6:54:19 PM  
Prelim Date: 2022   
6:55:25 PM  
Sign Date: 2022   
7:10:40 PM  
Ordering Provider: CALLUM JOHNS              [facility]   
([place])  
   
                                                    [place] 2022   
   
                                        CNOV                Office Visit ([facility]  
)  
--------------------------  
--  
MARC DECKER ([id number])   
1955 F  
Date Time Provider   
Department  
22 5:30 PM   
YUDELKA PEÑA   
[facility]  
During your visit today,   
we recorded the following   
information about you:  
Temperature Pulse   
Respiration Blood pressure  
97.5 degrees 100/minute   
18/minute 138/78  
Weight  
108.8 kg  
Yudelka Peña APRN.CNP 2022 5:32   
PM Signed  
Subjective  
HPI Marc Decker is a   
67 year old female who   
presents with cough,   
shortness  
of breath, chest pain,   
feeling dizzy and like she   
is going to pass out. This  
began on Thursday and is   
getting worse. She feels   
lightheaded during exam.  
Review of Systems  
Constitutional: Negative   
for chills and fever.  
Respiratory: Positive for   
cough and shortness of   
breath.  
Cardiovascular: Positive   
for chest pain.  
Gastrointestinal: Negative   
for nausea and vomiting.  
Neurological: Positive for   
dizziness and weakness.  
/78   Pulse 100     
Temp 36.4 ?C (97.5 ?F)     
Resp 18   Wt 108.8 kg  
(239 lb 12.8 oz)   SpO2   
97%  
No past medical history on   
file.  
No past surgical history   
on file.  
ALLERGIES Ciprofloxacin   
and Penicillins  
MEDICATIONS  
ursodiol (ACTIGALL) 300 mg   
capsule TAKE 1 CAPSULE BY   
MOUTH TWICE A DAY FOR  
FATTY LIVER  
EVOLOCUMAB (REPATHA   
SYRINGE SUBCUTANEOUS)   
Inject subcutaneously.   
Monthly  
injection  
Potassium 20 mg Chew Take   
by mouth.  
amLODIPine 5 mg tablet   
Take 5 mg by mouth once   
daily.  
chlorthalidone 25 mg   
tablet Take 25 mg by mouth   
once daily.  
Aspirin 81 mg CpDR Take by   
mouth.  
traMADol 50 mg TbDL Take   
by mouth. (Patient not   
taking: Reported on  
2022)  
MV with   
Min-Lycopene-Lutein 0.4   
mg-300 mcg- 250 mcg tab   
Take by mouth.  
(Patient not taking:   
Reported on 2022)  
No family history on file.  
Social History  
Tobacco Use  
Smoking status: Never  
Smokeless tobacco: Never  
Objective  
Physical Exam  
Vitals and nursing note   
reviewed.  
Constitutional:  
Appearance: Normal   
appearance.  
Cardiovascular:  
Rate and Rhythm: Normal   
rate and regular rhythm.  
Heart sounds: Normal heart   
sounds.  
Pulmonary:  
Effort: Pulmonary effort   
is normal. No respiratory   
distress.  
Breath sounds: Normal   
breath sounds. No wheezing   
or rales.  
Skin:  
General: Skin is warm and   
dry.  
Findings: No erythema or   
rash.  
Neurological:  
Mental Status: She is   
alert.  
ASSESSMENT/PLAN:  
1. SOB (shortness of   
breath) - ICD9: 786.05,   
ICD10: R06.02  
- due to patient stating   
multiple times during exam   
 I feel like I'm going to  
pass out  and  I can't   
catch my breath  she is   
referred to ER for further  
evaluation and treatment.   
Lab and xray are not open   
today, the day after  
Isabell. She verbalized   
understanding.  
DORA Hernandez.CNP  
Allergies As of Date:   
2022 Noted Allergy   
Reaction  
CIPROFLOXACIN 2018   
14 - Other: See Comments  
Comments: Burning in veins  
PENICILLINS 2012 4 -   
Hives  
Date Reviewed: 2022  
Reviewed by: Elaina Mckee MA - Fully   
Assessed  
Reason for Visit:  
Nausea AND Vomiting [237]   
Cmt: Diarrhea, cough, sob,   
dizzy x 2 weeks  
Primary Visit   
Diagnosis:SOB (shortness   
of breath) [R06.02]  
Prescriptions as of   
2022  
- ursodiol (ACTIGALL) 300   
mg capsule  
TAKE 1 CAPSULE BY MOUTH   
TWICE A DAY FOR FATTY   
LIVER  
- EVOLOCUMAB (REPATHA   
SYRINGE SUBCUTANEOUS)  
Inject subcutaneously.   
Monthly injection  
- Aspirin 81 mg CpDR  
Take by mouth.  
- Potassium 20 mg Chew  
Take by mouth.  
- amLODIPine 5 mg tablet  
Take 5 mg by mouth once   
daily.  
- traMADol 50 mg TbDL  
Take by mouth.  
- chlorthalidone 25 mg   
tablet  
Take 25 mg by mouth once   
daily.  
- MV with   
Min-Lycopene-Lutein 0.4   
mg-300 mcg- 250 mcg tab  
Take by mouth.  
Problem List As Of Date:   
2022  
(None)  
Encounter Number:   
[id number]  
Encounter Status:Closed by   
YUDELKA PEÑA on   
22            Normal                                  [facility]  
   
                                                    Donny 12-   
   
                                        CNPN                Telephone ([facility])  
--------------------------  
--  
MARC DECKER ([id number])   
1955 F  
Date Time Provider   
Department  
12/15/22 LAXMI KRUASE   
[facility]  
During your visit today,   
we recorded the following   
information about you:  
DORA Rucker.CNP   
12/15/2022 8:47 AM Signed  
Please notify of negative   
covid and influenza test.   
Continue comfort measures  
for symptoms as you would   
for a cold. Any worsening   
symptoms follow up with  
PCP or ER.  
DORA Rucker.JOEL Dockery LPN   
12/15/2022 8:50 AM Signed  
Phone call placed, brief   
message to contact a nurse   
for results.  
Tara Montilla LPN   
12/15/2022 8:52 AM Signed  
LM for patient with   
results and   
recommendations.Petty Bentley RN   
12/15/2022 8:55 AM Signed  
Patient calls and notified   
of results and providers   
instructions. Patient  
verbalizes understanding.  
Lidia Bentley RN  
Allergies As of Date:   
12/15/2022 Noted Allergy   
Reaction  
CIPROFLOXACIN 2018   
14 - Other: See Comments  
Comments: Burning in veins  
PENICILLINS 2012 4 -   
Hives  
Date Reviewed: 2022  
Reviewed by: Zainab Garcia - Fully Assessed  
Reason for Visit:  
Results [95]  
Prescriptions as of   
12/15/2022  
- oseltamivir (TAMIFLU) 75   
mg capsule  
Take 1 capsule by mouth   
twice daily for 5 days.  
- EVOLOCUMAB (REPATHA   
SYRINGE SUBCUTANEOUS)  
Inject subcutaneously.   
Monthly injection  
- Aspirin 81 mg CpDR  
Take by mouth.  
- Potassium 20 mg Chew  
Take by mouth.  
- amLODIPine 5 mg tablet  
Take 5 mg by mouth once   
daily.  
- traMADol 50 mg TbDL  
Take by mouth.  
- chlorthalidone 25 mg   
tablet  
Take 25 mg by mouth once   
daily.  
- MV with   
Min-Lycopene-Lutein 0.4   
mg-300 mcg- 250 mcg tab  
Take by mouth.  
Problem List As Of Date:   
12/15/2022  
(None)  
Encounter Number:   
[id number]  
Encounter Status:Closed by   
PETTY MONTILLA LPN on   
12/15/22            Normal                                  [facility]  
   
                                                    CNOVon 2022   
   
                                        CNOV                Office Visit ([facility]  
)  
--------------------------  
--  
MARC DECKER ([id number])   
1955 F  
Date Time Provider   
Department  
22 2:30 PM   
YUDELKA PEÑA   
[facility]  
During your visit today,   
we recorded the following   
information about you:  
Temperature Pulse   
Respiration Blood pressure  
97.5 degrees 96/minute   
16/minute 132/80  
Weight  
109.3 kg  
DRAKE HernandezCNP 2022 2:34   
PM Signed  
ASSESSMENT/PLAN:  
1. Sore throat - ICD9:   
462, ICD10: J02.9 (primary   
diagnosis)  
- suspect viral  
- Alere Strep Test   
NEGATIVE, no culture   
pending  
- Discussed supportive   
care treatment with   
fluids, rest and   
analgesia.  
- STREP A MOLECULAR (POC)  
2. Viral URI with cough -   
ICD9: 465.9, ICD10: J06.9  
- Discussed viral etiology   
and rationale for   
treatment.  
- Symptomatic treatment   
with prn analgesia  
- Supportive care with   
fluids and rest  
- COVID WITH FLUA+B,   
ROUTINE  
- OSELTAMIVIR 75 MG   
CAPSULE  
- Follow-up with your PCP   
in 3-5 days if symptoms   
have not improved or   
sooner  
if symptoms worsen  
- Discussed red flags and   
need for immediate medical   
evaluation if any occur.  
- Discussed supportive   
care treatment with   
fluids, rest and   
analgesia.  
- Discussed expected   
course of illness  
DRAKE HernandezCNP  
Treatment for Viral Upper   
Respiratory Tract   
Infections  
Your body will kill off   
the virus by itself.   
Additionally, you can   
prime your  
body's immune system. This   
may help you get better   
more quickly.  
Drink lots of fluids - at   
least one gallon of   
non-caffeinated liquids   
per day  
Make sure you are eating   
well  
Get plenty of rest  
We do not have any   
medications that kill off   
these viruses. Antibiotics   
are  
used to treat bacterial   
infections; however, they   
are not active against   
viral  
infections. There are some   
things that might help you   
feel better, though.  
Vaporizers, humidifiers,   
hot showers, and hot   
fluids help open   
respiratory and  
sinus passages  
Ocean Nasal Spray may   
offer relief of nasal and   
head congestion  
Israel's Vapor Rub may   
relieve congestion  
Tylenol and Advil help   
control fevers and   
headaches  
Salt water gargles help   
relieve sore throats  
Chloraceptic spray or   
throat lozenges may also   
help relieve sore throat   
symptoms  
Occasionally, viral   
infections turn into   
something more serious.   
You should  
see your doctor or return   
to the Urgent Care if:  
You have fevers for longer   
than five days  
You have fevers above 102   
degrees  
You are still sick after   
10 days  
You have shortness of   
breath or wheezing  
After several days you are   
getting worse rather than   
better  
DORA Hernandez.CNP 2022 2:53   
PM Signed  
Subjective  
HPI Marc Decker is a   
67 year old female who   
presents with 2 days of   
sore  
throat, headache, sinus   
pain, congestion, cough,   
body aches. She rates her   
sore  
throat 7/10. She has been   
taking tylenol and Zarbees   
cough syrup. She has not  
had a fever. She states   
her granddaughter has   
recently been ill with   
cold  
symptoms.  
Review of Systems  
Constitutional: Negative   
for fever.  
HENT: Positive for   
congestion and sore   
throat.  
Respiratory: Positive for   
cough. Negative for   
shortness of breath.  
Cardiovascular: Negative.  
Musculoskeletal: Positive   
for myalgias.  
Neurological: Positive for   
headaches.  
/80   Pulse 96     
Temp 36.4 ?C (97.5 ?F)     
Resp 16   Wt 109.3 kg (241  
lb)   SpO2 96%  
No past medical history on   
file.  
No past surgical history   
on file.  
ALLERGIES Ciprofloxacin   
and Penicillins  
MEDICATIONS  
EVOLOCUMAB (REPATHA   
SYRINGE SUBCUTANEOUS)   
Inject subcutaneously.   
Monthly  
injection  
Potassium 20 mg Chew Take   
by mouth.  
amLODIPine 5 mg tablet   
Take 5 mg by mouth once   
daily.  
MV with   
Min-Lycopene-Lutein 0.4   
mg-300 mcg- 250 mcg tab   
Take by mouth.  
oseltamivir (TAMIFLU) 75   
mg capsule Take 1 capsule   
by mouth twice daily for 5  
days.  
Aspirin 81 mg CpDR Take by   
mouth.  
traMADol 50 mg TbDL Take   
by mouth. (Patient not   
taking: Reported on  
2022)  
chlorthalidone 25 mg   
tablet Take 25 mg by mouth   
once daily.  
No family history on file.  
Social History  
Tobacco Use  
Smoking status: Never  
Smokeless tobacco: Never  
Objective  
Physical Exam  
Vitals and nursing note   
reviewed.  
Constitutional:  
Appearance: Normal   
appearance. She is obese.  
HENT:  
Right Ear: Tympanic   
membrane, ear canal and   
external ear normal.  
Left Ear: Tympanic   
membrane, ear canal and   
external ear normal.  
Nose: Congestion present.  
Mouth/Throat:  
Mouth: Mucous membranes   
are moist.  
Pharynx: Oropharynx is   
clear. Uvula midline.   
Posterior oropharyngeal  
erythema present. No   
oropharyngeal exudate.  
Cardiovascular:  
Rate and Rhythm: Normal   
rate and regular rhythm.  
Heart sounds: Normal heart   
sounds.  
Pulmonary:  
Effort: Pulmonary effort   
is normal. No respiratory   
distress.  
Breath sounds: Normal   
breath sounds. No wheezing   
or rales.  
Musculoskeletal:  
Cervical back: Neck   
supple.  
Lymphadenopathy:  
Cervical: No cervical   
adenopathy.  
Skin:  
General: Skin i (more   
content not included)... Normal                                  [facility]  
   
                                                    LIPID PANEL (CORONARY RISK 2  
)on 2022   
   
                                                    Cholesterol   
[Mass/Vol]      168 mg/dL       Normal          0 - 199         [facility]  
   
                                        Comment on above:   Result Comment: .  
AGE DESIRABLE BORDERLINE HIGH HIGH  
0-19 Y 0 - 169 170 - 199 >/= 200  
20-24 Y 0 - 189 190 - 224 >/= 225  
>24 Y 0 - 199 200 - 239 >/= 240  
**All ranges are based on fasting samples. Specific  
therapeutic targets will vary based on patient-specific  
cardiac risk.  
.  
Pediatric guidelines reference:Pediatrics 2011, 128(S5).  
Adult guidelines reference: NCEP ATPIII Guidelines,  
DOMINIC 2001, 258:2486-97  
.  
Venipuncture immediately after or during the  
administration of Metamizole may lead to falsely  
low results. Testing should be performed immediately  
prior to Metamizole dosing.   
   
                                                            Performed By: #### L  
IPID ####  
[facility]  
[id number]  
[place] [address]   
   
                                                    Cholesterol in HDL   
[Mass/Vol]      47.1 mg/dL      Normal                          [facility]  
   
                                        Comment on above:   Result Comment: .  
AGE VERY LOW LOW NORMAL HIGH  
0-19 Y < 35 < 40 40-45 ----  
20-24 Y ---- < 40 >45 ----  
>24 Y ---- < 40 40-60 >60  
.   
   
                                                            Performed By: #### L  
IPID ####  
[facility]  
[id number]  
[place] [address]   
   
                                                    Cholesterol in LDL   
[Mass/Vol]      62 mg/dL        Normal          0 - 99          [facility]  
   
                                        Comment on above:   Result Comment: .  
NEAR BORD  
AGE DESIRABLE OPTIMAL HIGH HIGH VERY HIGH  
0-19 Y 0 - 109 --- 110-129 >/= 130 ----  
20-24 Y 0 - 119 --- 120-159 >/= 160 ----  
>24 Y 0 - 99 100-129 130-159 160-189 >/=190  
.   
   
                                                            Performed By: #### L  
IPID ####  
[facility]  
[id number]  
[place] [address]   
   
                                                    Cholesterol in VLDL   
[Mass/Vol]      59 mg/dL        High            0 - 40          [facility]  
   
                                        Comment on above:   Performed By: #### L  
IPID ####  
[facility]  
[id number]  
[place] [address]   
   
                                                    Cholesterol.total/Ch  
olesterol in HDL   
[Mass ratio]    3.6 {ratio}     Normal                          [facility]  
   
                                        Comment on above:   Result Comment: REF   
VALUES  
DESIRABLE < 3.4  
HIGH RISK > 5.0   
   
                                                            Performed By: #### L  
IPID ####  
[facility]  
[id number]  
[place] [address]   
   
                      NON-HDL CHOLESTEROL 121 mg/dL  Normal                [facility] [place]  
   
                                        Comment on above:   Result Comment: AGE   
DESIRABLE BORDERLINE HIGH HIGH VERY HIGH  
0-19 Y 0 - 119 120 - 144 >/= 145 >/= 160  
20-24 Y 0 - 149 150 - 189 >/= 190 ----  
>24 Y 30 MG/DL ABOVE LDL CHOLESTEROL GOAL  
.   
   
                                                            Performed By: #### L  
IPID ####  
[facility]  
[id number]  
[place] [address]   
   
                                                    Triglyceride   
[Mass/Vol]      297 mg/dL       High            0 - 149         [facility]  
   
                                        Comment on above:   Result Comment: .  
AGE DESIRABLE BORDERLINE HIGH HIGH VERY HIGH  
0 D-90 D 19 - 174 ---- ---- ----  
91 D- 9 Y 0 - 74 75 - 99 >/= 100 ----  
10-19 Y 0 - 89 90 - 129 >/= 130 ----  
20-24 Y 0 - 114 115 - 149 >/= 150 ----  
>24 Y 0 - 149 150 - 199 200- 499 >/= 500  
.  
Venipuncture immediately after or during the  
administration of Metamizole may lead to falsely  
low results. Testing should be performed immediately  
prior to Metamizole dosing.   
   
                                                            Performed By: #### L  
IPID ####  
[facility]  
[id number]  
[place] [address]   
   
                                                    Lipid Panelon 2022   
   
                                                    Cholesterol   
[Mass/Vol]      168 mg/dL                       0 - 199         [facility]   
[facility]  
on  
Work Phone:   
[phone]  
   
                                        Comment on above:   . AGE DESIRABLE BORD  
RIVERA HIGH HIGH 0-19 Y 0 - 169 170 - 199   
>/=   
200 20-24 Y 0 - 189 190 - 224 >/= 225 >24 Y 0 - 199 200 - 239 >/=   
240 **All ranges are based on fasting samples. Specific   
therapeutic targets will vary based on patient-specific cardiac   
risk.. Pediatric guidelines reference:Pediatrics 2011, 128(S5).   
Adult guidelines reference: NCEP ATPIII Guidelines, DOMINIC 2001,   
258:2486-97. Venipuncture immediately after or during the   
administration of Metamizole may lead to falsely low results.   
Testing should be performed immediately prior to Metamizole   
dosing.   
   
                                                    Cholesterol in HDL   
[Mass/Vol]      47.1 mg/dL                                      [facility]   
[facility]  
on  
Work Phone:   
[phone]  
   
                                        Comment on above:   . AGE VERY LOW LOW N  
ORMAL HIGH 0-19 Y < 35 < 40 40-45 ---- 20-  
24   
Y ---- < 40 >45 ---- >24 Y ---- < 40 40-60 >60.   
   
                                                    Cholesterol in LDL   
[Mass/Vol]      62 mg/dL                        0 - 99          [place]   
[facility]  
on  
Work Phone:   
[phone]  
   
                                        Comment on above:   . NEAR BORD AGE ROMEL  
RABLE OPTIMAL HIGH HIGH VERY HIGH 0-19 Y 0  
 -   
109 --- 110-129 >/= 130 ---- 20-24 Y 0 - 119 --- 120-159 >/= 160   
---- >24 Y 0 - 99 100-129 130-159 160-189 >/=190.   
   
                                                    Cholesterol non HDL   
[Mass/Vol]      121 mg/dL                                       [place]   
[facility]  
on  
Work Phone:   
[phone]  
   
                                        Comment on above:   AGE DESIRABLE BORDER  
LINE HIGH HIGH VERY HIGH 0-19 Y 0 - 119   
120 -   
144 >/= 145 >/= 160 20-24 Y 0 - 149 150 - 189 >/= 190 ---- >24 Y   
30 MG/DL ABOVE LDL CHOLESTEROL GOAL.   
   
                                                    Cholesterol.total/Ch  
olesterol in HDL   
[Mass ratio]    3.6 {ratio}                                     -Bagel Nash   
Medical   
[facility]  
on  
Work Phone:   
[phone]  
   
                                        Comment on above:   REF VALUESDESIRABLE   
< 3.4HIGH RISK > 5.0   
   
                                                    Triglyceride   
[Mass/Vol]                297 mg/dL                 above high   
threshold                 0 - 149                   -Bagel Nash   
Medical   
[facility]  
on  
Work Phone:   
[phone]  
   
                                        Comment on above:   . AGE DESIRABLE BORD  
RIVERA HIGH HIGH VERY HIGH 0 D-90 D 19 -   
174   
---- ---- ----91 D- 9 Y 0 - 74 75 - 99 >/= 100 ---- 10-19 Y 0 -   
89 90 - 129 >/= 130 ---- 20-24 Y 0 - 114 115 - 149 >/= 150 ----   
>24 Y 0 - 149 150 - 199 200- 499 >/= 500. Venipuncture   
immediately after or during the administration of Metamizole may   
lead to falsely low results. Testing should be performed   
immediately prior to Metamizole dosing.   
   
                                Lipid Panel     59 mg/dL        above high   
threshold                 0 - 40                    -Bagel Nash   
Medical   
[facility]  
on  
Work Phone:   
[phone]  
   
                                                    Office Visit (Internal Medic  
ine)on 2022   
   
                                        Follow-up visit     Diagnoses/Problems  
Health Maintenance/Risks  
Encounter for preventive   
health examination (V70.0)   
(Z00.00)  
Assessed  
Pre-operative clearance   
(V72.84) (Z01.818)  
GERD (gastroesophageal   
reflux disease) (530.81)   
(K21.9)  
Hyperglycemia (790.29)   
(R73.9)  
Hyperlipidemia (272.4)   
(E78.5)  
Acquired hammertoes of   
both feet (735.4)   
(M20.41,M20.42)  
Orders  
Acquired hammertoes of   
both feet  
Start: Meloxicam 15 MG   
Oral Tablet; TAKE 1 TABLET   
DAILY  
Rx By: Savana Pedro;   
Dispense: 90 Days ; #:90   
Tablet; Refill: 3;For:   
Acquired hammertoes of   
both feet; LIZZY = N; Sent   
To: [facility]/PHARMACY #[id number];   
Last Updated By: System,   
DoubleCheck [employer]er; 3/7/2022   
2:59:37 PM  
Hyperlipidemia  
Lipid Panel; Status:In   
Progress - Specimen/Data   
Collected; Done: 2022  
Perform:Lab Services - Lab   
To Draw (Blood Test);   
Due:2022;Ordered;   
For:Hyperlipidemia;   
Ordered By:Savana Pedro;  
Provider Impressions  
In review of all issues no   
change in medication is   
necessary at this time.   
Recommend to continue with   
well rounded nutritional   
meals and daily physical   
activity. Recommend to   
maintain compliance with   
all prescribed   
medications.  
Medically stable for   
surgery  
  
Chief Complaint  
pre admit  
  
Adult Risk Screening  
  
Advance Care Planning   
discussed and documented   
in the medical record,   
patient did not wish or   
was not able to name a   
surrogate decision maker   
or provide an advance care   
plan.  
Living Will.  
Living Will: No living   
will on file.  
Healthcare POA: No   
healthcare proxy on file.  
Blood Pressure monitoring  
Blood Pressure Controlled,   
Systolic <130 mm Hg  
Blood Pressure Controlled,   
Diastolic < 80mm Hg  
Depression/Suicide   
Screening:  
During the past 2 weeks,   
the patient has not felt   
down, depressed or   
hopeless.  
During the past 2 weeks,   
the patient has not felt   
little interest or   
pleasure in doing things.  
  
History of Present Illness  
The patient is being seen   
for a preoperative visit.   
The procedure is a(n) Rt   
TKR scheduled for 3/15/22   
with Greg Pena DO.   
The indication for surgery   
is OA.  
Surgical Risk Assessment:   
Pertinent Past Medical   
History: no pertinent past   
medical history. Exercise   
Capacity: able to walk   
four blocks without   
symptoms and able to walk   
two flights of stairs   
without symptoms.   
Lifestyle Factors: tobacco   
use, heavy alcohol   
consumption and denies   
illegal drug use.   
Symptoms: no symptoms.  
Additional ELSY risk   
factors include age over   
50, but normal BMI, female   
gender and normal neck   
circumference. Pertinent   
Family History: no   
pertinent family history.  
Living Situation: home is   
secure and supportive and   
no post-op concerns with   
her living situation.  
Compliant with daily blood   
pressure medication.   
Denies any light head or   
dizzy with use of   
medication. Denies any   
obvious medications side   
effects  
GERD is well compensated   
with daily use of   
medication. Denies any   
nausea, vomiting, diarrhea   
or constipation  
Reports compliance with   
daily cholesterol lowering   
medication. Denies any   
myalgia associated with   
statin use. Denies any   
obvious side effects to   
medication.  
Patient reports all   
medications are up to date   
and no refills are   
necessary at this time.  
Most recent lab,   
radiologic and diagnostic   
studies reviewed.  
  
Review of Systems  
Constitutional: no fever,   
no chills, not feeling   
poorly, not feeling tired,   
no recent weight gain and   
no recent weight loss.  
ENT: no earache, no   
hearing loss, no   
nosebleeds, no nasal   
discharge, no sore throat   
and no hoarseness.  
Cardiovascular: the heart   
rate was not slow, the   
heart rate was not fast,   
no chest pain, no   
palpitations, no   
intermittent leg   
claudication and no lower   
extremity edema.  
Respiratory: no cough, not   
coughing up sputum and no   
wheezing that is   
consistent with asthma.  
Gastrointestinal: no   
abdominal pain, no   
constipation, no melena,   
no nausea, no diarrhea, no   
vomiting and no blood in   
stools.  
Musculoskeletal: no   
arthralgias, no myalgias,   
no back pain, no joint   
swelling, no joint   
stiffness, no limb pain   
and no limb swelling.  
Integumentary: no skin   
rashes, no skin lesions,   
no itching, no skin wound   
and no dry skin.  
Neurological: no headache,   
no confusion, no numbness,   
no dizziness, no tingling   
and no fainting.  
All other systems have   
been reviewed and are   
negative for complaint.  
  
Active Problems  
Problems  
Acquired hammertoes of   
both feet (735.4)   
(M20.41,M20.42)  
Carpal tunnel syndrome   
(354.0) (G56.00)  
GERD (gastroesophageal   
reflux disease) (530.81)   
(K21.9)  
Hyperglycemia (790.29)   
(R73.9)  
Hyperlipidemia (272.4)   
(E78.5)  
Insomnia, idiopathic   
(307.42) (F51.01)  
Macular degeneration   
(362.50) (H35.30)  
Neuropathy of left foot   
(355.8) (G57.92)  
OA (osteoarthritis) of   
knee (715.36) (M17.10)  
Stasis edema of left lower   
extremity (459.30)   
(I87.302)  
Visit for screening   
mammogram (V76.12)   
(Z12.31)  
Past Medical History  
Problems  
History of Hepatitis C,   
chronic (070.54) (B18.2)  
Resolved Date: 25 May 2016  
History of tinea cruris   
(V12.09) (Z86.19)  
Resolved Date: 15 Aug 2016  
History of Opioid   
dependence (304.00) (F11.   
(more content not   
included)...        Normal                                     
[facility]  
   
                                                    PHQ-2 [facility] 2022   
   
                                                    Adult depression   
screening assessment No                                              [facility]  
on  
Work Phone:   
[phone]  
   
                                        Fall risk assessment a) No falls within   
the   
last year                                                   [facility]  
on  
Work Phone:   
[phone]  
   
                                                    Tobacco use status   
[facility]            b) No                                           [facility]  
on  
Work Phone:   
[phone]  
   
                      PHQ-2 VITALS Yes                              [facility]  
on  
Work Phone:   
[phone]  
   
                      PHQ-2 VITALS Adult                            [facility]  
on  
Work Phone:   
[phone]  
   
                                                    No Panel Informationon    
   
                                                                  [facility]  
Work Phone:   
[phone]  
   
                                                            [email or web address]  
[email or web address]  
[email or web address]  
[email or web address]                                                        [facility]  
Work Phone:   
[phone]  
   
                                                    Order Reconciliationon    
   
                                        Order Reconciliation Page 1  
  
Discharge Reconciliation   
Document  
  
  
  
Reconciliation Type:   
Discharge requested on   
behalf of Candida Whitley   
(Physician)  
done by Candida [place]ley)  
  
Discharge -   
Reconciliation:   
2021 07:05 by:   
Candida [place])  
  
Home Medications   
EnteredHOME MEDICATIONS AT   
DISCHARGE   
DateReconciliation  
Comment/ Additional   
Information  
aMILoride 5 mg oral tablet   
1 tab(s) orally once a day   
2020 15:26  
aMILoride 5 mg oral tablet   
1 tab(s) orally once a day   
2020 15:26  
aMILoride 5 mg oral tablet   
is continued as aMILoride   
5 mg oral tablet  
CeleBREX 200 mg oral   
capsule 1 cap(s) orally   
once a day 2021   
12:22  
CeleBREX 200 mg oral   
capsule 1 cap(s) orally   
once a day 2021   
12:22  
CeleBREX 200 mg oral   
capsule is continued as   
CeleBREX 200 mg oral   
capsule  
chlorothiazide 250 mg oral   
tablet 2021 12:23   
chlorothiazide 250 mg  
oral tablet 2021   
12:23 chlorothiazide 250   
mg oral tablet is  
continued as   
chlorothiazide 250 mg oral   
tablet  
Klor-Con 8 oral tablet,   
extended release orally   
once a day 2020   
15:26  
Klor-Con 8 oral tablet,   
extended release orally   
once a day 2020  
15:26 Klor-Con 8 oral   
tablet, extended release   
is continued as Klor-Con 8   
oral  
tablet, extended release  
Multi Vitamin+ orally once   
a day 2020 15:26   
Multi Vitamin+ orally  
once a day 2020   
15:26 Multi Vitamin+ is   
continued as Multi   
Vitamin+  
Praluent Pen 2021   
12:21 Praluent Pen   
2021 12:21  
Praluent Pen is continued   
as Praluent Pen  
  
  
Home Medications Added   
During Discharge   
Reconciliation  
Activity as Tolerated   
2021, Routine,   
Assistance Level: None,  
Restrictions: None  
Additional Patient   
Instructions Do not   
consume alcoholic   
beverages for 24  
hours.  
Additional Patient   
Instructions Do not make   
important decisions or   
sign any  
important documents for   
the next 24 hours.  
Additional Patient   
Instructions Do not smoke   
for 24 hours.  
Call Physician For:   
excessive bleeding (slow   
general oozing that   
completely  
soaks dressing or fresh   
bright red bleeding) or   
bleeding that will not   
stop.  
Apply pressure to the area   
and elevate.  
Call Physician For:   
inability to urinate every   
8-12 hours and your   
bladder  
becomes too full or   
painful.  
Call Physician For:   
persistant nausea and/or   
vomiting Over 24 hours  
Call Physician For: signs   
and sypmtoms of infection   
Increased redness or  
swelling at incision site,   
increased pain/tenderness   
at surgical site,  
increased temperature   
greater than 100 degress,   
increasing and/or   
progressive  
drainage from surgical   
site, and/or unusual odor   
from surgical site.  
Diet Regular  
Discharge Discharge   
Diagnosis< Z12.11 Colon   
cancer screening Discharge  
Provider, Candida Whitley   
Discharge Disposition :   
.Home Condition at   
Discharge:  
Satisfactory  
Discharge Communication   
Instructions for Nursing   
Only: Remove IV prior to  
discharge from hospital.   
Do not remove any midline,   
if present, without an  
order from the provider.  
Discharge Instructions -   
PHR After your discharge   
from the hospital, two  
Summary of Care Documents   
will be available online   
in your  Personal Health  
Record (PHR).   
1.Consolidated-Clinical   
Document Architecture   
(C-CDA) Patient  
Discharge Summary This   
document is a summary of   
your hospital stay to be   
kept  
for your reference.2.C-CDA   
Visit Summary This   
document is a summary of   
your  
hospital stay to be shared   
with your follow-up   
providers (doctor,   
dietician,  
physical therapist, etc.).  
Post Procedure Discharge   
Criteria Criteria: Easily   
arousable / responding  
appropriately; Significant   
complications are absent;   
SpO2 = or > 92%, or if  
SpO2 < 92%, maintains   
within 2% of baseline;   
Vital signs +/- 20% of  
preprocedure status;   
Ambulates without   
dizziness / age   
appropriate activity and  
ambulatory status returns   
to pre-procedure baseline.  
  
  
All Active Home   
Medications at time of   
Discharge Reconciliation:   
2021  
07:05  
Activity as Tolerated   
2021, Routine,   
Assistance Level: None,  
Restrictions: None  
Additional Patient   
Instructions Do not   
consume alcoholic   
beverages for 24  
hours.  
Additional Patient   
Instructions Do not make   
important decisions or   
sign any  
important documents for   
the next 24 hours.  
Additional Patient   
Instructions Do not smoke   
for 24 hours.  
aMILoride 5 mg oral tablet   
1 tab(s) orally once a day  
Call Physician For:   
excessive bleeding (slow   
general oozing that   
completely  
soaks dressing or fresh   
bright red bleeding) or   
bleeding that will not   
stop.  
Apply pressure to the area   
and elevate.  
Call Physician For:   
inability to urinate every   
8-12 hours and your   
bladder  
becomes too full or   
painful.  
Call Physician For:   
persistant nausea and/or   
vomiting Over 24 hours  
Call Physician For: signs   
and sypmtoms of infection   
Increased redness or  
swelling at incision site,   
increased pain/tenderness   
at surgical site,  
increased temperature   
greater than 100 degress,   
increasing and/or   
progressive  
drainage from surgical   
site, and/or unusual odor   
from surg (more content   
not included)...    Normal                                  [facility]  
tmenton 2021   
   
                                                    [facility]   
Pathology Department                    Name MARC DECKER  
  
Accession #: [id number]  
Pathologist: GRACIELA TANNER MD  
Date of Procedure:   
2021  
Date Received: 2021  
Date Reported 11/10/2021  
Submitting Physician:   
CANDIDA WHITLEY MD  
Location: [facility]   
Endoscopy Other External #  
FINAL DIAGNOSIS  
A. CECAL POLYP,   
POLYPECTOMY:  
--TUBULAR ADENOMA.  
B. TRANSVERSE COLON POLYP,   
POLYPECTOMY:  
--LUMINAL CONTENTS.  
C. DESCENDING COLON POLYP,   
POLYPECTOMY:  
--TUBULAR ADENOMA.  
  
  
  
  
  
  
  
Electronically Signed Out   
By GRACIELA TANNER MD/ANDREINA  
By the signature on this   
report, the individual or   
group listed as making the  
Final   
Interpretation/Diagnosis   
certifies that they have   
reviewed this case.  
  
Clinical History:  
Physician Contact Number:   
[phone]  
Fixative (A): Formalin  
Fixative (B): Formalin  
Fixative (C): Formalin  
Clinical Diagnosis History   
MARC DECKER is a 66   
year old Female seen at   
the  
request of Dr. Pedro for   
colon cancer screening.   
She has a family history   
of  
colon cancer. Her father   
was diagnosed and    
of colon cancer at age 62.  
Her grandfather also had   
colon cancer. She has no   
change in bowel habits. No  
blood in the stool. No   
abdominal pain or   
unintentional weight loss.   
She has  
daily bowel movements and   
is not on any stool   
softeners, fiber   
supplements or  
laxatives. Her last   
colonoscopy was over 5   
years ago and she had   
polyps at that  
time. She also has a   
sister with colon polyps.   
She is not on any blood  
thinners.  
Specimens Submitted As:  
A: CECAL POLYP  
B: TRANSVERSE COLON POLYP  
C: DESCENDING COLON POLYP  
Gross Description:  
A: Received in formalin,   
labeled with the patient's   
name and hospital number  
and  cecal polyp , is a   
fragment of tan, soft   
tissue measuring 0.6 x 0.3   
x 0.2  
cm. The specimen is   
submitted in toto in one   
cassette.  
DPG  
B: Received in formalin,   
labeled with the patient's   
name and hospital number  
and  transverse colon   
polyp , are multiple   
fragments of tan, organic   
material  
aggregating to 0.6 x 0.2 x   
0.1 cm. No soft tissue   
identified. The specimen   
is  
submitted in toto in one   
cassette.  
DPG  
C: Received in formalin,   
labeled with the patient's   
name and hospital number  
and  descending colon   
polyp , is a fragment of   
tan, soft tissue measuring   
0.5 x  
0.2 x 0.1 cm. The specimen   
is submitted in toto in   
one cassette.  
DPG  
dpg/2021  
[facility]  
Department of Pathology  
[address] [place] [address] Normal                                  [facility]  
   
                                        Comment on above:   Performed By: #### U  
[facility] ####  
[facility] Pathology Department  
[address] [place] [address]   
   
                                                    CORONAVIRUS 2019, SCREEN ASY  
MPTOMATICon 2021   
   
                                                    SARS-CoV-2   
(COVID-19) RNA   
ADRYAN+probe Ql (Unsp   
spec)               Not detected        Normal              Not   
Detected                                [facility]  
   
                                        Comment on above:   Result Comment: .  
This assay is designed to detect the N, ORF1ab and/or S genes of   
SARS-CoV-2  
via nucleic acid amplification. A Negative (NOT DETECTED) result   
does not  
preclude 2019-nCoV infection since the adequacy of sample   
collection and/or  
low viral burden may result in presence of viral nucleic acids   
below the  
clinical sensitivity of this test method. Negative (NOT DETECTED)   
result  
should not be used as the sole basis for treatment or other   
patient  
management decisions. Rather negative results should be combined   
with  
clinical observations, patient history, and epidemiological   
information to  
make patient management decisions.  
Fact sheet for providers:   
https://www.fda.gov/media/423793/download  
Fact sheet for patients:   
https://www.fda.gov/media/514403/download  
This test has received FDA Emergency Use Authorization (EUA) and   
has been  
verified by [facility]   
([facility]). This test  
is only authorized for the duration of time that circumstances   
exist to  
justify the authorization of the emergency use of in vitro   
diagnostic tests  
for the detection of SARS-CoV-2 virus and/or diagnosis of   
COVID-19 infection  
under section 564(b)(1) of the Act, 21 U.S.C. 360bbb-3(b)(1),   
unless the  
authorization is terminated or revoked sooner.  
[facility] is certified under   
CLIA-88 as  
qualified to perform high complexity testing. Testing is   
performed in the  
[facility] laboratories located at [address] [place] [address].   
   
                                                            Performed By: #### C  
[facility] ####  
[place] [address]   
   
                      Lab Specimen Source Nasal, Nasopharyngeal Normal            
      [facility]  
   
                                        Comment on above:   Performed By: #### C  
[facility] ####  
[place] [address]   
   
                                                    Coronavirus 2019 RNA by PCR,  
 Screening Asymptomticon 2021   
   
                                                    Coronavirus 2019 RNA   
by PCR, Screening   
Asymptomtic     Not detected    Normal          See Below       -[facility]   
[facility]  
Work Phone:   
[phone]  
   
                                        Comment on above:   SOURCE: Nasal, Nasop  
haryngealReference Range: Not   
Detected.This   
assay is designed to detect the N, ORF1ab and/or S genes of   
SARS-CoV-2 via nucleic acid amplification. A Negative (NOT   
DETECTED) result does not preclude 2019-nCoV infection since the   
adequacy of sample collection and/or low viral burden may result   
in presence of viral nucleic acids below the clinical sensitivity   
of this test method. Negative (NOT DETECTED) result should not be   
used as the sole basis for treatment or other patient management   
decisions. Rather negative results should be combined with   
clinical observations, patient history, and epidemiological   
information to make patient management decisions.Fact sheet for   
providers: https://www.fda.gov/media/934980/downloadFact sheet   
for patients: https://www.fda.gov/media/566812/downloadThis test   
has received FDA Emergency Use Authorization (EUA) and has been   
verified by [facility]   
([facility]). This test is only authorized for the duration of time   
that circumstances exist to justify the authorization of the   
emergency use of in vitro diagnostic tests for the detection of   
SARS-CoV-2 virus and/or diagnosis of COVID-19 infection under   
section 564(b)(1) of the Act, 21 U.S.C. 360bbb-3(b)(1), unless   
the authorization is terminated or revoked sooner. [facility] is certified under CLIA-88 as   
qualified to perform high complexity testing. Testing is   
performed in the [facility] laboratories located at [address] [place] [address].   
   
                                                    Covid 19 Resultson   
1   
   
                                                    SARS-CoV-2   
(COVID-19) RNA   
ADRYAN+probe Ql (Unsp   
spec)                                   NEGATIVE COVID-19 Test  
  
Coronaviruses are common   
world-wide and are the   
cause of many common   
colds.  
SARS-COV2 is a new   
coronavirus that began   
circulating worldwide in   
2019 so we  
are calling it COVID-19.   
It has been estimated that   
four out of five patients  
with COVID-19 will recover   
at home without the need   
for medical attention.  
Symptoms of COVID-19 may   
include cough, fever,   
shortness of breath, loss   
of  
taste or smell and other   
flu-like symptoms   
including chills, sore   
muscles, sore  
throat, and headache.   
Severe illness is more   
common in older people and   
people  
with other health problems   
such as high blood   
pressure, obesity, and   
immune  
system problems.  
  
If the test is positive,   
you have COVID-19. You   
will be contacted by the  
ordering physicians office   
and instructed to remain   
on home isolation, in  
accordance with CDC   
guidelines. You may also   
be contacted by the [place]  
Department of Health to   
see if any of your close   
contacts may have been   
exposed  
to the virus and need to   
quarantine.  
  
If the test is negative,   
you likely do not have   
COVID-19 at this time, but   
you  
still may have a different   
illness that can spread to   
other people (like  
Influenza, or the Flu) and   
could still be at risk for   
getting COVID-19. We  
recommend that you stay   
away from other people to   
limit the spread of   
illness  
until your symptoms are   
improving and you are   
fever-free for 24 hours   
without  
the use of fever lowering   
medications such as   
acetaminophen or   
ibuprofen. No  
test is 100% accurate so   
if you are still concerned   
you may have COVID-19,   
talk  
to your doctor about the   
need to continue to stay   
away from others.  
  
Medicines  
  
Unless your provider told   
you not to use the   
following: Acetaminophen   
(Tylenol  
and others) is generally   
safe. Anti-inflammatory   
medications, such as   
Ibuprofen  
(Advil or Motrin) or   
Naproxen (Aleve) can also   
be used. Over-the-counter  
cough and cold medicines   
can be used according to   
the instructions on the  
package. Some   
over-the-counter medicines   
also contain   
acetaminophen. Make sure  
you are not taking more   
than your recommended   
dose. For those not   
hospitalized,  
there is no specific   
treatment available for   
this illness. Antibiotics   
do not  
treat Coronaviruses.  
  
Follow-Up  
  
Follow up with your doctor   
by scheduling a virtual   
visit or consider   
follow-up  
at one of our urgent care   
fever clinics. If you are   
having difficulty  
breathing, or are very   
weak and having difficulty   
standing, this is a   
medical  
emergency. Call 911 or   
have someone take you to   
the nearest emergency room  
immediately. If possible,   
wear a facemask.  
  
Additional guidance from   
the CDC for patients who   
tested POSITIVE for   
COVID-19  
  
How to isolate:  
Isolate yourself in a   
specific room at home and   
limit your contact with   
others.  
Use a separate bathroom   
from other members of the   
household, when possible.  
Leave home only to get   
essential medical care. Do   
not go to work, school or  
public areas.  
Avoid using public   
transportation,   
ride-sharing, or taxis.  
Restrict contact with pets   
and other animals. If you   
must care for your pet or  
be around animals while   
you are sick, wash your   
hands before and after   
your  
interaction and wear a   
facemask.  
Make sure that shared   
spaces in the home have   
good airflow, such as by   
an air  
conditioner or an opened   
window, weather   
permitting.  
  
Personal Hygiene   
Procedures:  
Wear a face mask when in   
the same room as other   
people or pets. If a face   
mask  
interferes with your   
breathing, others should   
wear a mask when sharing   
space  
with you.  
Frequent hand-washing:   
wash your hands with soap   
and water for at least 20  
seconds. If soap and water   
are not available, use   
alcohol-based hand   
.  
Avoid touching your eyes,   
nose, and mouth with   
unwashed hands.  
  
Household Hygiene   
Procedures:  
Avoid sharing personal   
household items such as   
dishes, glassware, cups,   
eating  
utensils, towels or   
bedding with other people   
or pets in your home.   
After use,  
these items should be   
washed with soap and hot   
water.  
Disinfect all high-touch   
surfaces every day with   
antibacterial cleaning  
solutions such as Lysol   
wipes, bleach, cleansers,   
etc. High-touch surfaces  
include tabletops,   
doorknobs, bathroom   
fixtures, toilets, phones,   
keyboards,  
tablets and bedside   
tables.  
Immediately clean any   
surfaces that may have   
blood, poop or body fluids   
on  
them, using antibacterial   
cleaning solutions such as   
Lysol wipes, bleach,  
cleansers, etc.  
If clothing or bedding   
come into contact with   
blood, poop or body   
fluids, they  
should be washed   
immediately. Follow the   
directions on the laundry   
detergent  
and clothing labels but   
hot water is recommended   
when possible.  
  
Stopping home isolation   
precautions:  
If possible, consult your   
doctor before stopping   
home isolation   
precautions.  
According to the CDC, you   
can discontinue home   
isolation precautions when   
you  
have met both of these   
criteria:  
Your fever and respiratory   
symptoms have been gone   
for 24 anderson (more content   
not included)...    Normal                                  [facility] Mamm Screen w/CAD if perf  
ormed bilaton 2019   
   
                                                    [place] Mamm Screen w/CAD   
if performed bilat                      Exam Date/Time:  
2019 10:15 EST  
Reason for Exam:  
SCREENING  
Report  
STUDY:  
Digital mammography   
screening; 2019 10:15   
am  
ACCESSION NUMBER(S):  
[id number]  
ORDERING CLINICIAN:  
Dandre Pedro  
INDICATION:  
Screening.  
COMPARISON:  
Comparison is made to   
prior digital mammograms   
dated 2017  
FINDINGS:  
CC and MLO 2D digital   
mammographic images of the   
bilateral breasts were   
obtained.  
There are areas of   
scattered fibroglandular   
tissue. No discrete mass   
or focal asymmetry is  
identified. No suspicious   
microcalcifications or   
foci of architectural   
distortion are seen.  
There has been no   
significant change.  
This study was interpreted   
with CAD.  
IMPRESSION:  
No mammographic evidence   
of malignancy.  
BI-RADS CATEGORY:  
Category: 1 - Negative.  
Recommendation: Normal   
Interval Follow-up, Over   
Age 40.  
Recall Interval: 12   
Months.  
Breast Density: Scattered   
Fibroglandular Density.  
***** FINAL REPORT *****  
Dictated: 2019 8:18   
am Garfield Marshall MD  
Signed (Electronic   
Signature): 2019   
8:18 am  
Signed by: Garfield Marshall MD  
Technologist: OLAF  
Assessment: BI-RADS   
Category 1-Negative  
Recommendation: Normal   
interval follow-up  Normal                                  [facility]  
   
                                                    XR Abdomen 1 Viewon 20   
   
                                        XR Abdomen 1 View   Exam Date/Time:  
2019 11:03 EST  
Reason for Exam:  
HX OF KIDNEY STONES  
Report  
STUDY:  
XR Abdomen 1 View;   
2019 11:03 am  
INDICATION:  
HX OF KIDNEY STONES.  
COMPARISON:  
None.  
ACCESSION NUMBER(S):  
[id number]  
ORDERING CLINICIAN:  
Elijah Bronson  
FINDINGS:  
Nonobstructive bowel gas   
pattern. Limited   
evaluation of   
pneumoperitoneum on supine   
imaging,  
however no gross evidence   
of free air is noted.   
Large bowel is full of   
stool. There are no  
radiopaque renal stones.  
Visualized lungs are   
clear.  
Osseous structures   
demonstrate no acute bony   
changes. There is   
degenerative disc disease   
of the  
lumbar spine.  
IMPRESSION:  
1. Large bowel full of   
stool. No radiopaque renal   
stones.  
***** FINAL REPORT *****  
Dictated: 2019 12:44   
pm Checo Garcia MD  
Signed (Electronic   
Signature): 2019   
12:44 pm  
Signed by: Checo Garcia MD  
Technologist:     Normal                                  [facility]  
  
  
  
Vital Signs  
  
  
                      Date Time  Vital Sign Value      Performing Clinician Kaitlin jauregui  
   
                                                    2022   
20:                              Diastolic Blood   
Pressure Non-Invasive     74 1                      ROBERT GUZMAN MD  
Work Phone:   
[phone]                           [facility]  
   
                                                    2022   
20:          Heart rate          86 /min             ROBERT GUZMAN MD  
Work Phone:   
[phone]                           [facility]  
   
                                                    2022   
20:          Respiratory rate    18 /min             ROBERT GUZMAN MD  
Work Phone:   
[phone]                           [facility]  
   
                                                    2022   
20:                              Systolic Blood   
Pressure Non-Invasive     138 1                     ROBERT GUZMAN MD  
Work Phone:   
[phone]                           [facility]  
   
                                                    2022   
16:          Body height         180.3 cm            ROBERT GUZMAN MD  
Work Phone:   
[phone]                           [facility]  
   
                                                    2022   
16:          Body temperature    98.6 [degF]         ROBERT GUZMAN MD  
Work Phone:   
[phone]                           [facility]  
   
                                                    2022   
16:          Body weight         104.5 kg            ROBERT GUZMAN MD  
Work Phone:   
[phone]                           [facility]  
   
                                                    2022   
16:                              Diastolic Blood   
Pressure Non-Invasive     81 1                      ROBERT GUZMAN MD  
Work Phone:   
[phone]                           [facility]  
   
                                                    2022   
16:          Heart rate          99 /min             ROBERT GUZMAN MD  
Work Phone:   
[phone]                           [facility]  
   
                                                    2022   
16:          Respiratory rate    20 /min             ROBERT GUZMAN MD  
Work Phone:   
[phone]                           [facility]  
   
                                                    2022   
16:                              Systolic Blood   
Pressure Non-Invasive     160 1                     ROBERT GUZMAN MD  
Work Phone:   
[phone]                           [facility]  
   
                                                    2022   
14:          Body height         180.34 cm           Savana Pedro  
Work Phone:   
[phone]                          [facility]  
Work Phone:   
[phone]  
   
                                                    2022   
14:                              Body mass index (BMI)   
[Ratio]                   33.12 kg/m2               Savana Pedro  
Work Phone:   
[phone]                          [facility]  
Work Phone:   
[phone]  
   
                                                    2022   
14:                              Body surface area   
Derived from formula      2.27 m2                   Savana Pedro  
Work Phone:   
[phone]                          [facility]  
Work Phone:   
[phone]  
   
                                                    2022   
14:          Body temperature    96.2 [degF]         Savana Pedro  
Work Phone:   
[phone]                          [facility]  
Work Phone:   
[phone]  
   
                                                    2022   
14:          Body weight         107.7 kg            Savana Pedro  
Work Phone:   
[phone]                          [facility]  
Work Phone:   
[phone]  
   
                                                    2022   
14:                              Diastolic blood   
pressure                  72 mm[Hg]                 Savana Pedro  
Work Phone:   
[phone]                          [facility]  
Work Phone:   
[phone]  
   
                                                    2022   
14:          Heart rate          100 /min            Savana Pedro  
Work Phone:   
[phone]                          [facility]  
Work Phone:   
[phone]  
   
                                                    2022   
14:                              SaO2% (BldA) [Mass   
fraction]                 93 %                      Savana Pedro  
Work Phone:   
[phone]                          [place]   
[facility]  
Work Phone:   
[phone]  
   
                                                    2022   
14:                              Systolic blood   
pressure                  120 mm[Hg]                Savana FLAKO Mainggelena  
Work Phone:   
[phone]                          [facility]  
Work Phone:   
[phone]  
  
  
  
Encounters  
  
  
                          Encounter Date Encounter Type Care Provider Facility  
   
                          Start: 2024 ambulatory   JANETTE Morrison  
cility:[facility]  
   
                                                    Start: 2024  
End: 2024     ambulatory          FRANCY MOSLEY    Not Available  
   
                          Start: 2024 ambulatory   Akshat Peterson Shane Orhéctor  
[facility]  
   
                                                    Start: 2023  
End: 2023     ambulatory          JANETTE FLOR CNP Facility:[facility]  
   
                          Start: 10- ambulatory   WILLIAN SIMMONSNGER Facility  
:[facility]  
   
                          Start: 2023 ambulatory   WILLIAN SIMMONSNGER Facility  
:[facility]  
   
                                Start: 2023 Rx Renewal      Referring Prov  
ider   
Unknown                                 [facility]  
Work Phone:   
[phone]  
   
                                Start: 2023 Rx Renewal      Savana Pedro  
Work Phone:   
[phone]                          [facility]  
Work Phone:   
[phone]  
   
                                                    Start: 2022  
End: 2022     ambulatory          DONY RAHMAN DO    Facility:[facility]  
   
                                                    Start: 2022  
End: 2022                         Patient encounter   
procedure                               DONY RAHMAN DO  
Work Phone:   
[phone]                           [facility]  
Work Phone:   
[phone]  
   
                                                    Start: 2022  
End: 2022                         Emergency department   
patient visit             RAEANN JOANNE DEJA           Facility:[facility]  
   
                                                    Start: 2022  
End: 2022                         Emergency department   
patient visit                           ROBERT GUZMAN MD  
Work Phone:   
[phone]                           [facility]  
Work Phone:   
[phone]  
   
                                                    Start: 2022  
End: 2022     ambulatory          SAVANA PEDRO      Facility:[facility]  
   
                                Start: 12- Telephone encounter Laxmi Krause APRN.CNP  
Work Phone:   
[phone]                          [facility]  
   
                                        Comment on above:   Results   
   
                                                    Start: 2022  
End: 2022     ambulatory          SAVANA PEDRO      Facility:[facility]  
   
                                Start: 2022 Rx Renewal      Savana Pedro  
Work Phone:   
[phone]                          [facility]  
Work Phone:   
[phone]  
   
                                Start: 2022 Rx Renewal      Savana B Willis  
Work Phone:   
[phone]                          [facility]  
Work Phone:   
[phone]  
   
                                Start: 2022 Chart Update    Savana B Wiggers  
Work Phone:   
[phone]                          [facility]  
Work Phone:   
[phone]  
   
                                        Start: 2022   Office outpatient vi  
sit   
25 minutes                              Savana B Wiggers  
Work Phone:   
[phone]                          [facility]   
[facility]  
Work Phone:   
[phone]  
   
                                Start: 2021 AUDIT           Savana B Wiggers  
Work Phone:   
[phone]                          [facility]   
[facility]  
Work Phone:   
[phone]  
   
                                Start: 2021 AUDIT           Savana B Wiggers  
Work Phone:   
[phone]                          [facility]  
Work Phone:   
[phone]  
   
                                Start: 2021 Telephone encounter Savana Harrison  
gers  
Work Phone:   
[phone]                          [facility]   
[facility]  
Work Phone:   
[phone]  
   
                                Start: 10- Rx Renewal      Savana B Wiggers  
Work Phone:   
[phone]                          [facility]  
Work Phone:   
[phone]  
   
                                Start: 10- AUDIT           Savana B Wiggers  
Work Phone:   
[phone]                          [facility]   
[facility]  
Work Phone:   
[phone]  
   
                                Start: 10- Rx Renewal      Savana B Wiggers  
Work Phone:   
[phone]                          [facility]   
[facility]  
Work Phone:   
[phone]  
   
                                Start: 2021 Rx Renewal      Savana B Wiggers  
Work Phone:   
[phone]                          [facility]   
[facility]  
Work Phone:   
[phone]  
   
                                Start: 2021 Rx Renewal      Savana B Wiggers  
Work Phone:   
[phone]                          [facility]   
[facility]  
Work Phone:   
[phone]  
   
                                Start: 2021 Rx Renewal      Savana FLAKO Willis  
Work Phone:   
[phone]                          [facility]  
Work Phone:   
[phone]  
   
                                Start: 2021 Rx Renewal      Savana FLAKO Vinayelena  
Work Phone:   
[phone]                          [facility]  
Work Phone:   
[phone]  
   
                                        Start: 2020   Patient encounter   
procedure                 Savana Pedro              [facility]  
Work Phone:   
[phone]  
   
                                        Start: 2019   Patient encounter   
procedure                 Savana Pedro              [facility]  
Work Phone:   
[phone]  
   
                                        Start: 2019   Patient encounter   
procedure                 Savana Pedro              [facility]  
Work Phone:   
[phone]  
   
                                        Start: 2019   Patient encounter   
procedure                 Savana Pedro              [facility]  
Work Phone:   
[phone]  
   
                                                    Start: 2019  
End: 2019                         Patient encounter   
procedure                 Elijah MELANIA Audie               Facility:[facility]  
   
                                                    Start: 2019  
End: 2019                         Patient encounter   
procedure                 Dandre Pedro           Facility:[facility]  
   
                                        Start: 2018   Patient encounter   
procedure                 Savana Pedro              [facility]  
Work Phone:   
[phone]  
   
                                                Preoperative state Savana hurley  
Work Phone:   
[phone]                          [facility]   
[facility]  
Work Phone:   
[phone]  
  
  
  
Procedures  
  
  
                          Date         Procedure    Procedure Detail Performing   
Clinician  
   
                          Start: 2021 Colonoscopy               Savana spear  
Work Phone: [phone]  
   
                          Start: 2021 Colonoscopy               Savana spear  
Work Phone: [phone]  
   
                                        Comment on above:   Repeat 3 years;   
   
                          Start: 2014 Mammography               Laxmi Degrootbreanna JOHN.CNP  
Work Phone: [phone]  
  
  
  
Plan of Treatment  
  
  
                          Date         Care Activity Detail       Author  
   
                                                    Start:   
2022                              SHIRA, Provider:   
Savana Pedro, Status:   
Pen, Time: 1:30 PM                      VIRFUVHOME, Provider:   
Savana Pedro, Status:   
Pen, Time: 1:30 PM                      [facility]  
Work Phone:   
[phone]  
   
                                                    Start:   
2022          Influenza vaccination INFLUENZA (#1)      [facility]  
   
                                                    Start:   
2022                              ADVANCE DIRECTIVE   
DISCUSSION                              ADVANCE DIRECTIVE   
DISCUSSION                              [facility]  
   
                                                    Start:   
2022          BONE DENSITY        BONE DENSITY        [facility]  
   
                                                    Start:   
2022                              PNEUMOCOCCAL: 65+ (1 -   
PCV)                                    PNEUMOCOCCAL: 65+ (1 -   
PCV)                                    [facility]  
   
                                                    Start:   
2022          DEPRESSION ASSESSMENT DEPRESSION ASSESSMENT [facility]  
   
                                                    Start:   
2021                              COLON, Provider:   
Candida Whitley, Status:   
Pen, Time: 7:30 AM                      COLON, Provider:   
Candida Whitley, Status:   
Pen, Time: 7:30 AM                      [facility]  
Work Phone:   
[phone]  
   
                                                    Start:   
2021                              COVID-19 VACCINE (2 -   
Booster for Rosaura   
series)                                 COVID-19 VACCINE (2 -   
Booster for Rosaura   
series)                                 [facility]  
   
                                                    Start:   
2015          Mammography         MAMMOGRAM           [facility]  
   
                                                    Start:   
2007                SHINGRIX VACCINE (1 of 2) SHINGRIX VACCINE (1 of   
2)                                      [facility]  
   
                                                    Start:   
2002          COLOGUARD (FIT-DNA) COLOGUARD (FIT-DNA) [facility]  
   
                                                    Start:   
2002          Colonoscopy         COLONOSCOPY         [facility]  
   
                                                    Start:   
2002                              COLORECTAL CANCER   
SCREENING                               COLORECTAL CANCER   
SCREENING                               [facility]  
   
                                                    Start:   
2002          CT COLONOGRAPHY     CT COLONOGRAPHY     [facility]  
   
                                                    Start:   
2002          DIABETES SCREEN     DIABETES SCREEN     [facility]  
   
                                                    Start:   
2002          FECAL OCCULT BLOOD  FECAL OCCULT BLOOD  [facility]  
   
                                                    Start:   
2002          LIPID SCREEN        LIPID SCREEN        [facility]  
   
                                                    Start:   
2002          SIGMOIDOSCOPY       SIGMOIDOSCOPY       [facility]  
   
                                                    Start:   
1976                              Urine microalbumin   
profile                   DTAP,TDAP,TD (1 - Tdap)   [facility]  
   
                                                    Start:   
1975          HEPATITIS C SCREENING HEPATITIS C SCREENING [facility]  
   
                                                    Start:   
1975          HIV SCREENING       HIV SCREENING       [facility]  
  
  
  
Immunizations  
  
  
                      Immunization Date Immunization Notes      Care Provider Fa  
cility  
   
                                        2021          Rosaura COVID-19 Vac  
cine   
0.5 ML Intramuscular   
Suspension                                          Savana McleanOhloh  
Work Phone:   
[phone]                          [facility]  
Work Phone:   
[phone]  
   
                                        10-          influenza virus vacc  
ine,   
unspecified formulation                             Savana McleanOhloh  
Work Phone:   
[phone]                          [facility]  
Work Phone:   
[phone]  
   
                                        Comment on above:   Series:   
   
                                        10-          zoster vaccine   
recombinant                                         Savana ARRIOLA [facility]  
Work Phone:   
[phone]                          [facility]  
Work Phone:   
[phone]  
   
                                        Comment on above:   Series:   
   
                                        10-          Influenza, injectabl  
e,   
Madin Shanna Canine   
Kidney, preservative   
free, quadrivalent                                  Savana ARRIOLA [facility]  
Work Phone:   
[phone]                          [facility] [facility]  
Work Phone:   
[phone]  
   
                                        01-          influenza, injectabl  
e,   
quadrivalent,   
preservative free                                   [facility] [employer]  
Work Phone:   
[phone]                          [facility]  
Work Phone:   
[phone]  
   
                                        2014          influenza virus vacc  
ine,   
unspecified formulation;   
Translations:   
[Influenza]                                         [facility] [employer]  
Work Phone:   
[phone]                          [place]   
[facility]  
Work Phone:   
[phone]  
   
                                        Comment on above:   Series:   
   
                                        2014          influenza, seasonal,  
   
injectable;   
Translations:   
[Influenza]                             Savana Pedro        [facility] [facility]  
Work Phone:   
[phone]  
   
                                        10-          influenza virus vacc  
ine,   
whole virus                                         [facility] [employer]  
Work Phone:   
[phone]                          [facility]  
Work Phone:   
[phone]  
  
  
  
Payers  
  
  
                          Date         Payer Category Payer        Policy ID  
   
                          2010   Unknown                   [id number]  
   
                          2009   Unknown                     
   
                          1955   Unknown                   [id number]  
[id number]  
   
                          1955   Unknown                   [id number] [id number]  
[id number]  
   
                          1957   Unknown                   [id number] [id number]  
[id number]  
   
                          1957   Unknown                   [id number] [id number]  
[id number]  
   
                          1955   Unknown                   [id number] [id number]  
[id number]  
   
                          1955   Unknown                   [id number] [id number]  
[id number]  
   
                          1955   Unknown                   [id number] [id number]  
[id number]  
   
                          1955   Unknown                   [id number] [id number]  
[id number]  
  
  
  
Social History  
  
  
                          Date         Type         Detail       Facility  
   
                                       Never smoker Never smoker -[facility]  
al   
[facility]  
Work Phone:   
[phone]  
   
                                        Start: 2022   Tobacco smoking   
status NHIS               Never smoked tobacco      [facility]  
Work Phone:   
[phone]  
   
                                        Start: 2022   Tobacco use and   
exposure                                Smokeless tobacco   
non-user                                [facility]  
Work Phone:   
[phone]  
   
                                        Start: 1955   Sex Assigned At   
Birth                     Not on file               [facility]  
   
                                                    NEGATED: Highlighted   
row                 -                   -                   [facility]  
Work Phone:   
[phone]  
  
  
  
Functional Status  
  
  
                          Date         Assessment   Result       Facility  
   
                                                    NEGATED: Highlighted   
row                       Functional performance    Functional status   
health issues are not   
documented Disease                      [facility]  
Work Phone:   
[phone]  
  
  
  
Mental Status  
  
  
                          Date         Assessment   Result       Facility  
   
                                                    NEGATED: Highlighted   
row                                     Cognitive function   
[Interpretation]                        Cognitive status   
health issues are not   
documented Disease                      [facility]  
Work Phone:   
[phone]  
  
  
  
Clinical Notes 2021 to 2022  
 Telephone Encounter - Lidia Bentley RN - 12/15/2022 8:54 AM ESTTelephone   
Encounter - Petty Montilla LPN - 12/15/2022 8:51 AM ESTTelephone Encounter - 
Tara Dockery LPN - 12/15/2022 8:49 AM EST  
  
                                Note Date & Type Note            Facility  
   
                                                    2022 Hospital   
Discharge instructions                    
  
  
Patient Education  
  
  
2022 20:39:06  
  
  
URI, Viral, No Abx (Adult)  
  
  
Viral Upper Respiratory Illness   
(Adult)  
  
  
You have a viral upper respiratory   
illness (URI), which is another term   
for the common cold. This illness is   
contagious during the first few   
days. It is spread through the air   
by coughing and sneezing. It may   
also be spread by direct contact   
(touching the sick person and then   
touching your own eyes, nose, or   
mouth). Frequent handwashing will   
decrease risk of spread. Most viral   
illnesses go away within 7 to 10   
days with rest and simple home   
remedies. Sometimes the illness may   
last for several weeks. Antibiotics   
will not kill a virus, and they are   
generally not prescribed for this   
condition.  
Home care  
If symptoms are severe, rest at home   
for the first 2 to 3 days. When you   
resume activity, don't let yourself   
get too tired.  
Don't smoke. If you need help   
stopping, talk with your healthcare   
provider.  
Avoid being exposed to cigarette   
smoke (yours or others ).  
You may use acetaminophen or   
ibuprofen to control pain and fever,   
unless another medicine was   
prescribed. If you have chronic   
liver or kidney disease, have ever   
had a stomach ulcer or   
gastrointestinal bleeding, or are   
taking blood-thinning medicines,   
talk with your healthcare provider   
before using these medicines.   
Aspirin should never be given to   
anyone under 18 years of age who is   
ill with a viral infection or fever.   
It may cause severe liver or brain   
damage.  
Your appetite may be poor, so a   
light diet is fine. Stay well   
hydrated by drinking 6 to 8 glasses   
of fluids per day (water, soft   
drinks, juices, tea, or soup). Extra   
fluids will help loosen secretions   
in the nose and lungs.  
Over-the-counter cold medicines will   
not shorten the length of time you   
re sick, but they may be helpful for   
the following symptoms: cough, sore   
throat, and nasal and sinus   
congestion. If you take prescription   
medicines, ask your healthcare   
provider or pharmacist which   
over-the-counter medicines are safe   
to use. (Note: Don't use   
decongestants if you have high blood   
pressure.)  
Follow-up care  
Follow up with your healthcare   
provider, or as advised.  
When to seek medical advice  
Call your healthcare provider right   
away if any of these occur:  
Cough with lots of colored sputum   
(mucus)  
Severe headache; face, neck, or ear   
pain  
Difficulty swallowing due to throat   
pain  
Fever of 100.4 F (38 C) or higher,   
or as directed by your healthcare   
provider  
Call 911  
Call 911 if any of these occur:  
Chest pain, shortness of breath,   
wheezing, or difficulty breathing  
Coughing up blood  
Very severe pain with swallowing,   
especially if it goes along with a   
muffled voice  
  
9902-5447 [employer].   
[address] [place] [address]   
[address]. All rights reserved. This   
information is not intended as a   
substitute for professional medical   
care. Always follow your healthcare   
professional's instructions.  
  
  
  
Follow Up Care  
  
  
2022 16:06:47  
  
  
With:Call Physician Referral   
[phone]  
Address:Unknown  
When:2-4 days                           [facility]  
Work Phone:   
[phone]  
   
                                                    2022 Emergency   
department Discharge   
summary                                   
  
  
  
  
Discharge Instructions  
Thank you for allowing [facility] to   
assist you with your healthcare   
needs. The following is important   
discharge information regarding your   
hospital visit. Diagnosis from   
Today's Visit  
Upper respiratory infection  
SOB - Shortness of breath  
  
  
What to Do Next  
  
Instructions from Your Care Team  
No qualifying data available.  
Post Acute Orders  
  
No qualifying data available.  
  
  
You Need to Schedule the Following   
Appointments  
  
Follow Up with Call Physician   
Referral [phone]  
When Within 2-4 days  
  
  
  
  
  
  
Allergies  
  
penicillin  
  
  
  
Medications  
Please ask your primary doctor or   
pharmacist before taking any other   
medication not listed, including   
over the counter drugs, herbal   
medications, vitamins and or   
supplements as they may interact   
with your home medications.  
  
Please take this list to your next   
doctor s visit. Bring all   
medications you take, including over   
the counter medications, herbals and   
other supplements with you to your   
doctor s visit. Patients and   
families are reminded to discard old   
lists and to update any records with   
all medication providers or retail   
pharmacies.  
  
  
  
Education Materials  
  
  
  
Viral Upper Respiratory Illness   
(Adult)  
You have a viral upper respiratory   
illness (URI), which is another term   
for the common cold. This illness is   
contagious during the first few   
days. It is spread through the air   
by coughing and sneezing. It may   
also be spread by direct contact   
(touching the sick person and then   
touching your own eyes, nose, or   
mouth). Frequent handwashing will   
decrease risk of spread. Most viral   
illnesses go away within 7 to 10   
days with rest and simple home   
remedies. Sometimes the illness may   
last for several weeks. Antibiotics   
will not kill a virus, and they are   
generally not prescribed for this   
condition.  
Home care  
If symptoms are severe, rest at home   
for the first 2 to 3 days. When you   
resume activity, don't let yourself   
get too tired.  
Don't smoke. If you need help   
stopping, talk with your healthcare   
provider.  
Avoid being exposed to cigarette   
smoke (yours or others ).  
You may use acetaminophen or   
ibuprofen to control pain and fever,   
unless another medicine was   
prescribed. If you have chronic   
liver or kidney disease, have ever   
had a stomach ulcer or   
gastrointestinal bleeding, or are   
taking blood-thinning medicines,   
talk with your healthcare provider   
before using these medicines.   
Aspirin should never be given to   
anyone under 18 years of age who is   
ill with a viral infection or fever.   
It may cause severe liver or brain   
damage.  
Your appetite may be poor, so a   
light diet is fine. Stay well   
hydrated by drinking 6 to 8 glasses   
of fluids per day (water, soft   
drinks, juices, tea, or soup). Extra   
fluids will help loosen secretions   
in the nose and lungs.  
Over-the-counter cold medicines will   
not shorten the length of time you   
re sick, but they may be helpful for   
the following symptoms: cough, sore   
throat, and nasal and sinus   
congestion. If you take prescription   
medicines, ask your healthcare   
provider or pharmacist which   
over-the-counter medicines are safe   
to use. (Note: Don't use   
decongestants if you have high blood   
pressure.)  
Follow-up care  
Follow up with your healthcare   
provider, or as advised.  
When to seek medical advice  
Call your healthcare provider right   
away if any of these occur:  
Cough with lots of colored sputum   
(mucus)  
Severe headache; face, neck, or ear   
pain  
Difficulty swallowing due to throat   
pain  
Fever of 100.4 F (38 C) or higher,   
or as directed by your healthcare   
provider  
Call 911  
Call 911 if any of these occur:  
Chest pain, shortness of breath,   
wheezing, or difficulty breathing  
Coughing up blood  
Very severe pain with swallowing,   
especially if it goes along with a   
muffled voice  
[phone] [employer].   
[address] [place] [address]   
[address]. All rights reserved. This   
information is not intended as a   
substitute for professional medical   
care. Always follow your healthcare   
professional's instructions.  
  
  
  
  
  
Additional Information  
VACCINATE! IT SAVES LIVES!  
Members of the community who have   
not yet received the COVID-19   
vaccine and would like to receive it   
can visit one of [facility] vaccine   
clinics. There are many vaccine   
clinic locations within the [facility].   
For locations and available times,   
please visit   
[email or web address].   
It is important to note that some   
COVID mobile vaccine clinics are   
held outdoors and may be canceled in   
rainy or stormy conditions.  
To learn more about pediatric   
vaccinations (ages 5-11), we invite   
you to visit the [facility]   
webpage.   
[email or web address]  
[email or web address]-A  
sked-Questions.html To learn more   
about the COVID-19 vaccine, we   
invite you to visit the [facility]   
website for a list of frequently   
asked questions.   
[email or web address]  
[email or web address]  
y_[email or web address]-Questions.[email or web address]  
[facility] Patient Portal   
Access Instructions:  
Stay connected with your healthcare   
team and access your personal   
medical information anytime with the   
[facility] Patient Portal. If   
you would like a full copy of your   
medical records please contact the   
[facility] Medical Records   
Department Monday through Friday   
between 8a.m. and 4:30p.m. Please   
follow the directions below to   
access the portal:  
1.Access the email account you   
provided upon registration to the   
hospital.2.Look for an invitation   
email from [facility].3.Open   
the email and access the invitation   
link: Accept Invitation to [facility]4.Fill in the required   
fields to create your account.  
Sign into [email or web address] with your   
username and password that you   
created in the above steps to stay   
up to date. You can then view a   
summary of results, a summary of   
your visits, and the ability to   
download your summaries to your   
computer or send the information   
securely to a physician. Remember   
that your healthcare information is   
confidential, so carefully consider   
who you will allow to register on   
the [facility] Patient Portal   
for access to your information. You   
can also access the [facility]   
Patient Portal on the [facility]   
julio. Simply click on  Health   
Records  under  Health Data  and   
then click on the [employer] logo.  
  
  
HOW TO SAFELY DISPOSE OF   
PRESCRIPTION MEDICATIONS  
Please use one of the following   
methods to safely dispose of your   
unused medications.  
1.Use a drug disposal kit: the drug   
disposal pouch allows you to safely   
discard your old and unused drugs.   
Ask your nurse to give you one when   
you are discharged.2.Visit a local   
take-back location: Many local   
pharmacies and police departments   
have programs that collect old and   
unwanted prescription drugs. Call   
your local pharmacy or go to   
http://XSteach.com.LendLayer/1V7Sw8w to find one   
close to you.3.Make use of household   
items: Use cat litter or old coffee   
grounds to dispose medications if   
other options are not available. Mix   
your drugs with these household   
products, seal them in an airtight   
container and throw it into the   
garbage. Call [facility]: [phone]   
to be sure your drugs can be   
disposed of in this way. Some   
medicines may require a different   
approach.4.Never flush your   
medications down the toilet.  
IF YOU HAVE BEEN PRESCRIBED AN   
OPIOIDS FOR PAIN  
If you have been prescribed an   
opioid (such as hydrocodone,   
oxycodone or morphine), it is   
critical to understand the possible   
side effects and risks of opioid   
pain medications. Even when taken as   
directed, opioids can have several   
side effects including:  
Tolerance, meaning you might need to   
take more of a medication for the   
same pain relief. Nausea, vomiting   
and/or constipation. Sleepiness,   
dizziness, dry mouth, confusion,   
depression or itching. Physical   
dependence, meaning you have   
withdrawal symptoms when a   
medication is stopped ? this can   
develop within a few days.  
KNOW YOUR RESPONSIBILITIES  
It is important to know exactly how   
much and how often to take the   
opioid pain medications you are   
prescribed. Never take opioids in   
higher amounts or more often than   
prescribed. Do not combine opioids   
with alcohol or other drugs that   
cause drowsiness, such as   
benzodiazepines, also known as   
benzos, including diazepam and   
alprazolam, muscle relaxants or   
sleep aids. Never sell or share   
prescription opioids. This is   
illegal. Store opioids in a secure   
place and out of reach of others   
(including children, family, friends   
and visitors).  
  
The last page(s) of this document   
has been signed and retained as a   
CHART COPY  
  
  
Signatures  
  
Patient Education Materials  
  
URI, Viral, No Abx (Adult)  
  
  
Medication Leaflets  
My discharge plan and instructions   
have been reviewed and explained to   
me and I,ARYA MARC ILIANA understand   
my current condition and have read   
and understand these discharge   
instructions. I have received a   
written copy of the   
plan/instructions. If I have   
questions, I am aware that I should   
contact my doctor.  
Patient/Representative   
Signature:__________________________  
___ Date/Time: _____________  
Relationship to   
Patient:____________________________  
_  
Witness   
Name/Signature:_____________________  
________ Date/Time: _____________  
  
                                        [facility]  
   
                                        2022 Note       
  
  
  
  
ORIGINAL  
  
EXAMINATION:  
ONE XRAY VIEW OF THE CHEST  
  
2022 6:29 pm  
  
COMPARISON:  
None.  
  
HISTORY:  
ORDERING SYSTEM PROVIDED HISTORY:  
Reason for Exam: chest pain  
  
FINDINGS:  
Normal cardiomediastinal contours.   
There is elevation of the right  
hemidiaphragm with right basilar   
atelectasis versus airspace disease.   
No  
vascular congestion, pleural   
effusion, or pneumothorax. There are  
degenerative changes within the   
shoulders and spine.  
  
IMPRESSION:  
Elevation of the right hemidiaphragm   
with right basilar atelectasis   
versus  
airspace disease.  
  
I have personally reviewed the   
images of this examination and agree   
with the  
resident's findings and   
interpretation.  
  
  
Interpreted by: Osvaldo Alejandra  
Preliminary Report By: Madison Choudhary  
Electronically signed By Osvaldo Alejandra  
Dictated Date: 2022 6:54:19 PM  
Prelim Date: 2022 6:55:25 PM  
Sign Date: 2022 7:10:40 PM  
Ordering Provider: CALLUM JOHNS         [facility]  
   
                                        2022 Note       
  
ORIGINAL  
  
EXAMINATION:  
ONE XRAY VIEW OF THE CHEST  
  
2022 6:29 pm  
  
COMPARISON:  
None.  
  
HISTORY:  
ORDERING SYSTEM PROVIDED HISTORY:  
Reason for Exam: chest pain  
  
FINDINGS:  
Normal cardiomediastinal contours.   
There is elevation of the right  
hemidiaphragm with right basilar   
atelectasis versus airspace disease.   
No  
vascular congestion, pleural   
effusion, or pneumothorax. There are  
degenerative changes within the   
shoulders and spine.  
  
IMPRESSION:  
Elevation of the right hemidiaphragm   
with right basilar atelectasis   
versus  
airspace disease.  
  
I have personally reviewed the   
images of this examination and agree   
with the  
resident's findings and   
interpretation.  
  
  
Interpreted by: Osvaldo Alejandra  
Preliminary Report By: Madison Choudhary  
Electronically signed By Osvaldo Alejandra  
Dictated Date: 2022 6:54:19 PM  
Prelim Date: 2022 6:55:25 PM  
Sign Date: 2022 7:10:40 PM  
Ordering Provider: [facility]  
   
                                                    2022 SARS-CoV-2   
(COVID-19) RNA ADRYAN+probe Ql   
(Nph)                                   Negative  
(22 5:55 PM)                      [facility]  
   
                                        2022 Note     HNO ID: [id number]  
Author: DORA Hernandez.CNP  
Service: ?  
Author Type: Nurse Practitioner  
Type: Progress Notes  
Filed: 2022 5:32 PM  
Note Text:  
Subjective  
HPI Marc Decker is a 67 year old   
female who presents with cough,  
shortness of breath, chest pain,   
feeling dizzy and like she is going   
to  
pass out. This began on Thursday and   
is getting worse. She feels  
lightheaded during exam.  
Review of Systems  
Constitutional: Negative for chills   
and fever.  
Respiratory: Positive for cough and   
shortness of breath.  
Cardiovascular: Positive for chest   
pain.  
Gastrointestinal: Negative for   
nausea and vomiting.  
Neurological: Positive for dizziness   
and weakness.  
/78   Pulse 100   Temp 36.4 ?C   
(97.5 ?F)   Resp 18   Wt 108.8  
kg (239 lb 12.8 oz)   SpO2 97%  
No past medical history on file.  
No past surgical history on file.  
ALLERGIES Ciprofloxacin and   
Penicillins  
MEDICATIONS  
ursodiol (ACTIGALL) 300 mg capsule   
TAKE 1 CAPSULE BY MOUTH TWICE A DAY  
FOR FATTY LIVER  
EVOLOCUMAB (REPATHA SYRINGE   
SUBCUTANEOUS) Inject subcutaneously.   
Monthly  
injection  
Potassium 20 mg Chew Take by mouth.  
amLODIPine 5 mg tablet Take 5 mg by   
mouth once daily.  
chlorthalidone 25 mg tablet Take 25   
mg by mouth once daily.  
Aspirin 81 mg CpDR Take by mouth.  
traMADol 50 mg TbDL Take by mouth.   
(Patient not taking: Reported on  
2022)  
MV with Min-Lycopene-Lutein 0.4   
mg-300 mcg- 250 mcg tab Take by   
mouth.  
(Patient not taking: Reported on   
2022)  
No family history on file.  
Social History  
Tobacco Use  
Smoking status: Never  
Smokeless tobacco: Never  
Objective  
Physical Exam  
Vitals and nursing note reviewed.  
Constitutional:  
Appearance: Normal appearance.  
Cardiovascular:  
Rate and Rhythm: Normal rate and   
regular rhythm.  
Heart sounds: Normal heart sounds.  
Pulmonary:  
Effort: Pulmonary effort is normal.   
No respiratory distress.  
Breath sounds: Normal breath sounds.   
No wheezing or rales.  
Skin:  
General: Skin is warm and dry.  
Findings: No erythema or rash.  
Neurological:  
Mental Status: She is alert.  
ASSESSMENT/PLAN:  
1. SOB (shortness of breath) - ICD9:   
786.05, ICD10: R06.02  
- due to patient stating multiple   
times during exam  I feel like I'm   
going  
to pass out  and  I can't catch my   
breath  she is referred to ER for  
further evaluation and treatment.   
Lab and xray are not open today, the   
day  
after Is[place]. She verbalized   
understanding.  
DORA Hernandez.[facility]  
   
                                                    12- Miscellaneous   
Notes                                   Formatting of this note might be   
different from the original.  
Patient calls and notified of   
results and providers instructions.   
Patient verbalizes understanding.  
  
Lidia Bentley RN  
Electronically signed by Lidia Bentley RN at 12/15/2022 8:55 AM   
EST  
Formatting of this note might be   
different from the original.  
LM for patient with results and   
recommendations.Petty Montilla LPN  
  
Electronically signed by Petty Montilla LPN at 12/15/2022 8:52 AM   
EST  
Formatting of this note might be   
different from the original.  
Phone call placed, brief message to   
contact a nurse for results.  
Tara Dockery LPN  
  
  
Electronically signed by Tara Dockery LPN at 12/15/2022 8:50 AM EST  
Formatting of this note might be   
different from the original.  
Please notify of negative covid and   
influenza test. Continue comfort   
measures for symptoms as you would   
for a cold. Any worsening symptoms   
follow up with PCP or ER.  
DORA Rucker.JOEL  
  
Electronically signed by Laxmi Krause APRN.CNP at 12/15/2022 8:47 AM EST  
documented in this encounter            [facility]  
   
                                        2022 Note     HNO ID: [id number]  
Author: Yudelka Peña APRN.CNP  
Service: ?  
Author Type: Nurse Practitioner  
Type: Progress Notes  
Filed: 2022 2:53 PM  
Note Text:  
Subjective  
HPI Marc Decker is a 67 year old   
female who presents with 2 days of  
sore throat, headache, sinus pain,   
congestion, cough, body aches. She  
rates her sore throat 7/10. She has   
been taking tylenol and [employer]es   
cough  
syrup. She has not had a fever. She   
states her granddaughter has   
recently  
been ill with cold symptoms.  
Review of Systems  
Constitutional: Negative for fever.  
HENT: Positive for congestion and   
sore throat.  
Respiratory: Positive for cough.   
Negative for shortness of breath.  
Cardiovascular: Negative.  
Musculoskeletal: Positive for   
myalgias.  
Neurological: Positive for   
headaches.  
/80   Pulse 96   Temp 36.4 ?C   
(97.5 ?F)   Resp 16   Wt 109.3 kg  
(241 lb)   SpO2 96%  
No past medical history on file.  
No past surgical history on file.  
ALLERGIES Ciprofloxacin and   
Penicillins  
MEDICATIONS  
EVOLOCUMAB (REPATHA SYRINGE   
SUBCUTANEOUS) Inject subcutaneously.   
Monthly  
injection  
Potassium 20 mg Chew Take by mouth.  
amLODIPine 5 mg tablet Take 5 mg by   
mouth once daily.  
MV with Min-Lycopene-Lutein 0.4   
mg-300 mcg- 250 mcg tab Take by   
mouth.  
oseltamivir (TAMIFLU) 75 mg capsule   
Take 1 capsule by mouth twice daily  
for 5 days.  
Aspirin 81 mg CpDR Take by mouth.  
traMADol 50 mg TbDL Take by mouth.   
(Patient not taking: Reported on  
2022)  
chlorthalidone 25 mg tablet Take 25   
mg by mouth once daily.  
No family history on file.  
Social History  
Tobacco Use  
Smoking status: Never  
Smokeless tobacco: Never  
Objective  
Physical Exam  
Vitals and nursing note reviewed.  
Constitutional:  
Appearance: Normal appearance. She   
is obese.  
HENT:  
Right Ear: Tympanic membrane, ear   
canal and external ear normal.  
Left Ear: Tympanic membrane, ear   
canal and external ear normal.  
Nose: Congestion present.  
Mouth/Throat:  
Mouth: Mucous membranes are moist.  
Pharynx: Oropharynx is clear. Uvula   
midline. Posterior oropharyngeal  
erythema present. No oropharyngeal   
exudate.  
Cardiovascular:  
Rate and Rhythm: Normal rate and   
regular rhythm.  
Heart sounds: Normal heart sounds.  
Pulmonary:  
Effort: Pulmonary effort is normal.   
No respiratory distress.  
Breath sounds: Normal breath sounds.   
No wheezing or rales.  
Musculoskeletal:  
Cervical back: Neck supple.  
Lymphadenopathy:  
Cervical: No cervical adenopathy.  
Skin:  
General: Skin is warm and dry.  
Findings: No erythema or rash.  
Neurological:  
Mental Status: She is alert.  
ASSESSMENT/PLAN:  
1. Sore throat - ICD9: 462, ICD10:   
J02.9 (primary diagnosis)  
- suspect viral  
- Alere Strep Test NEGATIVE, no   
culture pending  
- Discussed supportive care   
treatment with fluids, rest and   
analgesia.  
- STREP A MOLECULAR (POC)  
2. Viral URI with cough - ICD9:   
465.9, ICD10: J06.9  
- Discussed viral etiology and   
rationale for treatment.  
- Symptomatic treatment with prn   
analgesia  
- Supportive care with fluids and   
rest  
- COVID WITH FLUA+B, ROUTINE  
- OSELTAMIVIR 75 MG CAPSULE  
- Follow-up with your PCP in 3-5   
days if symptoms have not improved   
or  
sooner if symptoms worsen  
- Discussed red flags and need for   
immediate medical evaluation if any  
occur.  
- Discussed supportive care   
treatment with fluids, rest and   
analgesia.  
- Discussed expected course of   
illness  
DORA Hernandez.[facility]  
   
                                                    2022 Influenza virus   
A and B RNA and SARS-CoV-2   
(COVID-19) N gene panel   
ADRYAN+probe (Resp)                        COVID 19 RESULT:  
SARS-CoV-2 (Agent of COVID-19) Not   
Detected by RT-PCR or equivalent   
method.  
ramses SARS-CoV-2_[employer] (JULIA)_EUA  
This test was developed and its   
performance characteristics   
determined by [facility]'s   
[facility] [place]. This   
test has been authorized by FDA   
under an Emergency Use Authorization   
(EUA).  
This test has been validated in   
accordance with the FDA's Guidance   
Document  Policy for Diagnostics   
Testing in Laboratories Certified to   
Perform High Complexity Testing   
under CLIA prior to Emergency use   
Authorization for Coronavirus   
Disease 2019 during the Public   
Health Emergency  issued on 2020.  
Test performed by [facility], [facility]   
[facility] [place], [facility] [address].  
INFLUENZA A PCR:  
Negative for Influenza A by RT-PCR  
INFLUENZA B PCR:  
Negative for Influenza B by RT-PCR      [facility]  
   
                                        Comment on above:   Performed By: #### 9  
[id number] ####  
[facility]  
CLIA [id number]  
[address] [place] [address]  
DESK [address] [place]   
   
                                        2021 Note     History of Present I  
llness:  
Admission Reason: colon cancer   
screening  
HPI:  
MARC DECKER is a 66 year old   
Female seen at the request of Dr. Pedro for  
colon cancer screening. She has a   
family history of colon cancer. Her   
father  
was diagnosed and  of colon   
cancer at age 62. Her grandfather   
also had  
colon cancer. She has no change in   
bowel habits. No blood in the stool.   
No  
abdominal pain or unintentional   
weight loss. She has daily bowel   
movements and  
is not on any stool softeners, fiber   
supplements or laxatives. Her last  
colonoscopy was over 5 years ago and   
she had polyps at that time. She   
also has  
a sister with colon polyps. She is   
not on any blood thinners.  
  
Comorbidities:  
Comorbidites:  
Comorbid Conditionshypertension  
  
  
Allergies:  
penicillin: Hives/Urticaria  
Cipro I.V.: Unknown  
  
Medications Prior to Admission:  
Admission Medication Reconciliation   
has not been completed for this   
patient.  
  
Objective:  
Physical Exam by System:  
  
Constitutional: NAD, lying supine in   
bed  
Eyes: no scleral icterus  
Respiratory/Thorax: no labored   
breathing  
Cardiovascular: NSR  
Gastrointestinal: soft,   
non-distended  
Musculoskeletal: full range of   
motion  
Extremities: no peripheral edema  
Neurological: alert, oriented x3  
Psychological: normal affect  
Skin: no jaundice  
  
Assessment and Plan:  
Assessment:  
Ms. Decker is a 67yo female in need   
of colon cancer screening. Risks of  
colonoscopy were discussed with the   
patient. This included risks of   
bleeding,  
perforation, missed polyps, and   
potential need for additional   
procedures  
pending findings. She was agreeable   
to proceed.  
  
  
Electronic Signatures:  
Candida Whitley) (Signed   
2021 07:21)  
Authored: History of Present   
Illness, Comorbidities, Allergies,   
Medications  
Prior to Admission, Objective,   
Assessment and Plan, Note Completion  
  
  
Last Updated: 2021 07:21 by   
Candida Whitley)                      [facility]  
   
                                        Evaluation + Plan note   
  
  
Future Appointments  
  
  
Appointment Date:2022 11:15:00   
AM  
Scheduled Provider:  
Location:[facility]  
Appointment Type:MRI Spine Cervical   
w/o Contrast  
  
  
  
Future Scheduled TestsMRI Spine   
Cervical w/o Contrast 22  
  
                                        [facility]  
Work Phone:   
[phone]  
   
                                                    History of Present illness   
Narrative                               The patient is being seen for a   
preoperative visit. The procedure is   
a(n) Rt TKR scheduled for 3/15/22   
with Greg Pena DO. The   
indication for surgery is   
OA.Surgical Risk Assessment:   
Pertinent Past Medical History: no   
pertinent past medical history.   
Exercise Capacity: able to walk four   
blocks without symptoms and able to   
walk two flights of stairs without   
symptoms. Lifestyle Factors: tobacco   
use, heavy alcohol consumption and   
denies illegal drug use. Symptoms:   
no symptoms.Additional ELSY risk   
factors include age over 50, but   
normal BMI, female gender and normal   
neck circumference. Pertinent Family   
History: no pertinent family   
history.Living Situation: home is   
secure and supportive and no post-op   
concerns with her living   
situation.Compliant with daily blood   
pressure medication. Denies any   
light head or dizzy with use of   
medication. Denies any obvious   
medications side effectsGERD is well   
compensated with daily use of   
medication. Denies any nausea,   
vomiting, diarrhea or   
constipationReports compliance with   
daily cholesterol lowering   
medication. Denies any myalgia   
associated with statin use. Denies   
any obvious side effects to   
medication.Patient reports all   
medications are up to date and no   
refills are necessary at this   
time.Most recent lab, radiologic and   
diagnostic studies reviewed.            [facility]   
[facility]  
Work Phone:   
[phone]  
   
                                        Hospital course Narrative   
  
  
No data available for this section      [facility]  
Work Phone:   
[phone]  
   
                                                    Hospital Discharge   
instructions                              
  
  
No data available for this section      [facility]  
Work Phone:   
[phone]  
   
                                        Note                  
  
  
RAEANN SHORT MD: SIGN, VERIFY  
Event Display: EKG [ED AO] - CV  
Authored Date: [id number]  
  
  
                                        [facility]  
Work Phone:   
[phone]  
   
                                        Progress note         
  
  
No data available for this section      [facility]  
Work Phone:   
[phone]  
  
  
  
Summary Purpose  
  
  
                                                      
  
  
  
Family History  
No Family History Records Found      Mother  
  
                                Name            Dates           Details  
   
                                                    No pertinent family history(  
V49.89, Z78.9)  
                                                    Status:Active  
  
                                     Father  
  
                                Name            Dates           Details  
   
                                                    Family history of Colon nabila  
ocarcinoma(153.9, C18.9)  
                                                    Status:Active  
  
                              Unknown Family Member  
  
                                Name            Dates           Details  
   
                                                    No pertinent family history:  
 Mother(V49.89, Z78.9)  
                                                    Status:Active  
   
                                                    Colon adenocarcinoma: Father  
                                                    Status:Active  
  
                              Unknown Family Member  
  
                                Name            Dates           Details  
   
                                                    No pertinent family history:  
 Mother(V49.89, Z78.9)  
                                                    Status:Active  
   
                                                    Colon adenocarcinoma: Father  
                                                    Status:Active  
  
                              Unknown Family Member  
  
                                Name            Dates           Details  
   
                                                    No pertinent family history:  
 Mother(V49.89, Z78.9)  
                                                    Status:Active  
   
                                                    Colon adenocarcinoma: Father  
                                                    Status:Active  
  
                              Unknown Family Member  
  
                                Name            Dates           Details  
   
                                                    No pertinent family history:  
 Mother(V49.89, Z78.9)  
                                                    Status:Active  
   
                                                    Colon adenocarcinoma: Father  
                                                    Status:Active  
  
                              Unknown Family Member  
  
                                Name            Dates           Details  
   
                                                    No pertinent family history:  
 Mother(V49.89, Z78.9)  
                                                    Status:Active  
   
                                                    Colon adenocarcinoma: Father  
                                                    Status:Active  
  
                              Unknown Family Member  
  
                                Name            Dates           Details  
   
                                                    Colon adenocarcinoma: Father  
                                                    Status:Active  
   
                                                    No pertinent family history:  
 Mother(V49.89, Z78.9)  
                                                    Status:Active  
  
                              Unknown Family Member  
  
                                Name            Dates           Details  
   
                                                    No pertinent family history:  
 Mother(V49.89, Z78.9)  
                                                    Status:Active  
   
                                                    Colon adenocarcinoma: Father  
                                                    Status:Active  
  
                              Unknown Family Member  
  
                                Name            Dates           Details  
   
                                                    Colon adenocarcinoma: Father  
                                                    Status:Active  
   
                                                    No pertinent family history:  
 Mother(V49.89, Z78.9)  
                                                    Status:Active  
  
                              Unknown Family Member  
  
                                Name            Dates           Details  
   
                                                    Colon adenocarcinoma: Father  
                                                    Status:Active  
   
                                                    No pertinent family history:  
 Mother(V49.89, Z78.9)  
                                                    Status:Active  
  
                              Unknown Family Member  
  
                                Name            Dates           Details  
   
                                                    No pertinent family history:  
 Mother(V49.89, Z78.9)  
                                                    Status:Active  
   
                                                    Colon adenocarcinoma: Father  
                                                    Status:Active  
  
                              Unknown Family Member  
  
                                Name            Dates           Details  
   
                                                    Colon adenocarcinoma: Father  
                                                    Status:Active  
   
                                                    No pertinent family history:  
 Mother(V49.89, Z78.9)  
                                                    Status:Active  
  
                              Unknown Family Member  
  
                                Name            Dates           Details  
   
                                                    No pertinent family history:  
 Mother(V49.89, Z78.9)  
                                                    Status:Active  
   
                                                    Colon adenocarcinoma: Father  
                                                    Status:Active  
  
  
  
Advance Directives  
No Advanced Directives Records FoundNo Advanced Directives Records FoundNo 
Advanced Directives Records FoundNo Advanced Directives Records FoundNo Advanced
Directives Records FoundNo Advanced Directives Records FoundNo Advanced 
Directives Records FoundNo Advanced Directives Records FoundNo Advanced
Directives Records Found  
  
Chief Complaint  
pre admit  
  
Health Concerns  
  
  
                          Infection    Onset Date   Last Indicated Resolved Time  
   
                          COVID-19 Rule-Out 2022   2022   12/15/2022  
 8:32 AM EST  
  
  
  
Additional Source Comments  
  
  
  
                                                    INFORMATION SOURCE (unrecogn  
ized section and content)  
   
                                          
  
  
  
                                        DATE CREATED        AUTHOR  
   
                                2019                      [facility]  
  
  
  
                                DATE CREATED    AUTHOR          AUTHOR'S ORGANIZ  
ATION  
   
                                2021                      [facility]  
  
  
  
                                DATE CREATED    AUTHOR          AUTHOR'S ORGANIZ  
ATION  
   
                                2022                      [facility]  
  
  
  
                                DATE CREATED    AUTHOR          AUTHOR'S ORGANIZ  
ATION  
   
                                2022                       [facility]  
  
  
  
                                DATE CREATED    AUTHOR          AUTHOR'S ORGANIZ  
ATION  
   
                                2022                      [facility]  
  
  
  
                                DATE CREATED    AUTHOR          AUTHOR'S ORGANIZ  
ATION  
   
                                2023                      [facility]  
oundation ([place])  
  
  
  
                                DATE CREATED    AUTHOR          AUTHOR'S ORGANIZ  
ATION  
   
                                2024                      [facility]  
  
  
  
                                DATE CREATED    AUTHOR          AUTHOR'S ORGANIZ  
ATION  
   
                                2024                      [facility]  
[facility]  
  
  
  
                                DATE CREATED    AUTHOR          AUTHOR'S ORGANIZ  
ATION  
   
                                2024                      [facility]  
  
  
  
  
  
                                                    Source Comments (unrecognize  
d section and content)  
   
                                                    In the event this informatio  
n is protected by the Federal Confidentiality of   
Alcohol   
and Drug Abuse Patient Records regulations: This information has been disclosed 
to   
you from records protected by Federal confidentiality rules (42 CFR Part 2). The
  
Federal rules prohibit you from making any further disclosure of this 
information   
unless further disclosure is expressly permitted by the written consent of the 
person   
to whom it pertains or as otherwise permitted by 42 CFR Part 2. A general   
authorization for the release of medical or other information is NOT sufficient 
for   
this purpose. The Federal rules restrict any use of the information to 
criminally   
investigate or prosecute any alcohol or drug abuse patient.[facility]  
  
  
  
  
                                                    Reason for Visit (unrecogniz  
ed section and content)  
   
                                          
  
  
  
                                        Reason              Comments  
   
                                        Results               
  
  
  
  
  
                                                    Care Teams (unrecognized sec  
tion and content)  
   
                                          
  
  
  
                      Team Member Relationship Specialty  Start Date End Date  
   
                                                      
  
  
Savana Pedro DO  
  
  
[phone] (Work)  
  
  
[phone] (Fax) PCP - General   Internal Medicine 11           
  
  
  
  
  
                                                    Care Team (unrecognized sect  
ion and content)  
   
                                                      
  
  
Care Team Personnel  
  
  
Name: JESSICA [place]  
Position: AO RN  
Member Role: ED RN  
  
  
  
Name: CALLUM JOHNS DO  
Position: AH Resident  
Member Role: Resident  
Address:  
Address: [address] [place] [address]  
Emergency Resident  
[place], [place] [address]  
  
  
  
Name: Pina Hines RN  
Position: AO RN  
Member Role: RN  
  
  
  
Name: RAEANN SHORT MD  
Position:  ED Physician  
Member Role: ED Physician  
Address:  
Address: [facility]  
[address]  
  
  
  
No data available for this section  
  
FOR RECORDS PERTAINING TO PATIENTS WHO ARE OR HAVE BEEN ENROLLED IN A CHEMICAL 
DEPENDENCY/SUBSTANCEABUSE PROGRAM, SOME INFORMATION MAY BE OMITTED. This 
clinical summary was aggregated from multiple sources. Caution should be 
exercised in using it in the provision of clinical care. This summary normalizes
information from multiple sources, and as a consequence, information in this 
document may materially change the coding, format and clinical context of 
patient data. In addition, data may be omitted in some cases. CLINICAL DECISIONS
SHOULD BE BASED ON THE PRIMARY CLINICAL RECORDS. [facility] [employer] provides 
no warranty or guarantee of the accuracy or completeness of information in this 
document.

## 2024-09-16 ENCOUNTER — HOSPITAL ENCOUNTER (OUTPATIENT)
Dept: HOSPITAL 100 - OPBI | Age: 69
Discharge: HOME | End: 2024-09-16
Payer: COMMERCIAL

## 2024-09-16 DIAGNOSIS — Z12.31: Primary | ICD-10-CM

## 2024-09-16 PROCEDURE — 77067 SCR MAMMO BI INCL CAD: CPT

## 2024-09-16 PROCEDURE — 77063 BREAST TOMOSYNTHESIS BI: CPT

## 2024-09-16 NOTE — XMS RPT_ITS
Comprehensive CCD (C-CDA v2.1)  
  
                         Created on: 2024  
  
  
Marc Decker  
External Reference #: [id number],PersonID:[id number]  
: 1955  
Sex: Female  
  
Demographics  
  
  
                                        Address             [address] [place]  [address]  
   
                                        Home Phone          [phone]  
   
                                        Home Phone          [phone]  
   
                                        Home Phone          [phone]  
   
                                        Work Phone          [phone]  
   
                                        Home Phone          [phone]  
   
                                        Work Phone          [phone]  
   
                                        Home Phone          [phone]  
   
                                        Work Phone          [phone]  
   
                                        Home Phone          [phone]  
   
                                        Preferred Language  en  
   
                                        Marital Status        
   
                                        Orthodoxy Affiliation Unknown  
   
                                        Race                Unknown  
   
                                        Ethnic Group        Not  or Lati  
no  
  
  
Author  
  
  
                                        Organization        [facility]  
  
  
Care Team Providers  
  
  
                                Care Team Member Name Role            Phone  
   
                                Elijah Bronson    Attending       Unavailable  
   
                                Dandre [facility]    Unavailable  
   
                                Donny Dandre FLAKO Admitting       Unavailable  
   
                                Dandre Pedro Attending       Unavailable  
   
                                Donny Dandre B [facility]    Unavailable  
   
                                Elijah Bronson    Admitting       Unavailable  
   
                                Elijah Bronson    Attending       Unavailable  
   
                                Donny Dandre B [facility]    Unavailable  
   
                                Wiggers, Savana   Unavailable     Unavailable  
   
                                Wiggers, Savana B Unavailable     Unavailable  
   
                                Davis Cason  Unavailable     Unavailable  
   
                                Wiggers, Savana B Unavailable     [phone]  
   
                                Unavailable     Unavailable     [phone]  
   
                                Unavailable     Unavailable     [phone]  
   
                                Savana Pedro DO Primary Care Provider [phone]   
   
                                WIGGERS, SAVANA B Primary [facility]    Unavailable  
   
                                WIGGERS, SAVANA B [facility]    Unavailable  
   
                                RAEANN SHORT Attending       Unavailable  
   
                                DONY RAHMAN DO Attending       Unavailable  
   
                                Unknown, Referring Provider Unavailable     Unav  
ailable  
   
                                FRANCY MOSLEY Attending       Unavailable  
   
                                JANETTE SHAH CNP Attending       UnavailWILLIAN Mcclain [facility]    Unavailable  
   
                                NO FAMILY PHYSICIAN, [facility]    Unavail  
able  
   
                                NO FAMILY PHYSICIAN, [facility]    Unavail  
able  
   
                                NO FAMILY PHYSICIAN, [facility]    Unavail  
able  
   
                                ANNABELLE WILLOUGHBY CNP Attending       UnavailANNABELLE Alas CNP Referring       UnavailWILLIAN Mcclain [facility]    Unavailable  
   
                                AMINTAA WILLIAN [facility]    Unavailable  
   
                                AMINATAKESHAVWILLIAN Primary Care    Unavailable  
   
                                JANETTE SHAH CNP Attending       UnavailAkshat Butler   Attending       Unavailable  
   
                                Akshat Peterson   Referring       Unavailable  
   
                                Akshat Peterson   Attending       Unavailable  
   
                                Akshat Peterson   Referring       Unavailable  
  
  
  
Allergies  
  
  
                                                    Allergy   
Classification                          Reported   
Allergen(s)               Allergy Type              Date of   
Onset                     Reaction(s)               Facility  
   
                                                    Penicillins   
(antibiotic)  
(1 source)                              Penicillins;   
Translations:   
[Penicillins]   Drug Allergy                                    [facility]  
Work Phone:   
[phone]  
   
                                                    Quinolones   
(antibiotic)  
(1 source)                              Ciprofloxacin;   
Translations:   
[Cipro]         Drug Allergy                                    [facility]  
Work Phone:   
[phone]  
   
                                                      
(16 sources)                            Ciprofloxacin;   
Translations:   
[Cipro]         Drug Allergy                                    [facility]  
Work Phone:   
[phone]  
   
                                                      
(17 sources)                            Penicillins;   
Translations:   
[Penicillins]                           Allergy to   
drug   
(finding)                                 
2                                                   [facility]   
Repository  
   
                                                      
(2 sources)                             Ciprofloxacin;   
Translations:   
[CIPROFLOXACIN]           Drug Allergy                
8                                       Other: See   
Comments                                [facility]  
   
                                                      
(1 source)          Penicillins         Drug Allergy          
2                         Hives                     [facility]  
   
                                                      
(2 sources)                             Penicillin;   
Translations:   
[penicillin]    Drug Allergy                                    [facility]  
  
  
  
Medications  
Current Medications  
  
  
  
                      Medication Drug Class(es) Dates      Sig (Normalized) Sig   
(Original)  
   
                                                    oseltamivir 75 mg   
oral capsule  
(1 source)                              Neuraminidase   
Inhibitor                               Start:   
2022  
End:   
2022                              take 1 capsule by   
mouth twice daily                       oseltamivir   
(TAMIFLU) 75 mg   
capsule   
Indications:   
Viral URI with   
cough Take 1   
capsule by mouth   
twice daily for 5   
days. 10 capsule   
0 2022 Active  
   
                                        Comment on above:   Take 1 capsule by mo  
uth twice daily for 5 days.   
  
  
  
Completed/Discontinued Medications  
  
  
  
                      Medication Drug Class(es) Dates      Sig (Normalized) Sig   
(Original)  
   
                                                    1 ml alirocumab 75   
mg/ml   
auto-injector  
(16 sources)              PCSK9 Inhibitor           Start:   
2020                                          Praluent 75 MG/ML   
Subcutaneous   
Solution   
Auto-injector USE   
1 INJECTION EVERY   
2 WEEKS Quantity:   
3 Refills: 3   
Ordered:   
13-Mar-2023   
Savana Pedro DO   
Start :   
23-Sep-2020   
Active  
   
                                                    amLODIPine 5 mg   
oral tablet  
(18 sources)                            Dihydropyridine   
Calcium Channel   
Blocker                                 Start:   
2014                              take 1 tablet by   
mouth once daily                        amLODIPine   
Besylate 5 MG   
Oral Tablet TAKE   
1 TABLET BY MOUTH   
EVERY DAY   
Quantity: 90   
Refills: 3   
Ordered:   
22-May-2022   
Savana Pedro DO   
Start :   
2014   
Active  
   
                                        Comment on above:   Take 5 mg by mouth o  
nce daily.   
   
                                                    ascorbic acid 60   
mg / beta carotene   
5000 unt / copper   
sulfate 40 mg /   
dl-alpha   
tocopheryl acetate   
30 unt / sodium   
selenite 0.04 mg /   
zinc oxide 40 mg   
oral tablet  
(1 source)                Vitamin C                 Start:   
2019                              take 1 tablet by   
mouth once daily                        One Daily For   
Women 50+ Adv   
Oral Tablet   
Refills: 0 Start   
: 22-Mar-2019   
Active  
   
                                                    aspirin 81 mg oral   
tablet  
(1 source)                              Platelet Aggregation   
Inhibitor,   
Nonsteroidal   
Anti-inflammatory   
Drug                                                        Aspirin 81 mg   
CpDR Take by   
mouth. 0 Active  
   
                                        Comment on above:   Take by mouth.   
   
                                                    azithromycin 250   
mg oral tablet  
(6 sources)                             Macrolide   
Antimicrobial                           Start:   
10-                                          Azithromycin 250   
MG Oral Tablet   
TAKE 2 TABLETS ON   
DAY 1 THEN TAKE 1   
TABLET A DAY FOR   
4 DAYS. Quantity:   
1 Refills: 0   
Ordered:   
5-Oct-2021   
Savana Pedro DO   
Start :   
5-Oct-2021 Active  
  
  
  
                                        Start: 2020   take 2 tablets by mo  
uth once   
daily, then take 1 tablet by   
mouth, then take 1 tablet by   
mouth once daily                        Azithromycin 250 MG Oral Tablet TAKE   
2 TABLETS ON DAY 1 THEN TAKE 1   
TABLET A DAY FOR 4 DAYS. Quantity: 1   
Refills: 0 Savana Pedro DO Start :   
2020 Active 6 Tablet Pack  
  
  
  
                                                    celecoxib 200 mg   
oral capsule  
(11 sources)                            Nonsteroidal   
Anti-inflammatory Drug                  Start:   
2020                              take 1   
capsule by   
mouth once   
daily as   
needed                                  Celecoxib 200 MG   
Oral Capsule TAKE   
1 CAPSULE BY MOUTH   
EVERY DAY AS   
NEEDED Quantity:   
90 Refills: 0   
Ordered:   
26-Aug-2021   
Savana Pedro DO   
Start :   
2020 Active   
Remind patient she   
is due for office   
visit  
   
                                                    chlorthalidone 25   
mg oral tablet  
(17 sources)              Thiazide-like Diuretic    Start:   
10-                              take 1   
tablet by   
mouth once   
daily                                   Chlorthalidone 25   
MG Oral Tablet   
TAKE 1 TABLET BY   
MOUTH EVERY DAY   
Quantity: 90   
Refills: 0   
Ordered:   
2023   
Savana Pedro DO   
Start :   
26-Oct-2020 Active  
   
                                        Comment on above:   Take 25 mg by mouth   
once daily.   
   
                                                    1 ml evolocumab 140   
mg/ml auto-injector  
(2 sources)               PCSK9 Inhibitor           Start:   
2016                                          Repatha SureClick   
140 MG/ML   
Subcutaneous   
Solution   
Auto-injector   
INJECT 1 PEN   
(140MG)   
SUBCUTANEOUSLY   
EVERY TWO WEEKS   
Quantity: 3   
Refills: 3 Savana Pedro DO Start :   
2016 Active   
2 x 1 ML Pen  
  
  
  
                                                                EVOLOCUMAB (REPA  
MAURIZIO SYRINGE SUBCUTANEOUS) Inject subcutaneously. Monthly   
injection 0 Active  
  
  
  
                                        Comment on above:   Inject subcutaneousl  
y. Monthly injection   
   
                                                    famotidine 26.6 mg /   
ibuprofen 800 mg oral   
tablet  
(1 source)                              Nonsteroidal   
Anti-inflammatory   
Drug, Histamine-2   
Receptor Antagonist                     Star  
t:   
  
19                                      take 1 tablet by   
mouth twice   
daily                                   Duexis 800-26.6 MG Oral   
Tablet Take 1 tablet   
twice daily Quantity: 9   
Refills: 0 Savana Pedro DO Start :   
2019 Active  
   
                                                    hydroCHLOROthiazide   
12.5 mg oral tablet  
(1 source)                Thiazide Diuretic         Star  
t:   
12-2  
3-20  
19                                                  hydroCHLOROthiazide   
12.5 MG Oral Tablet   
TAKE 1 TABLET DAIY   
Quantity: 90 Refills: 3   
Savana Pedro DO Start   
: 23-Dec-2019 Active  
   
                                                    3 ml liraglutide 6   
mg/ml pen injector  
(10 sources)                            GLP-1 Receptor   
Agonist                                 Star  
t:   
10-2  
6-20  
20                                      inject 3 mg by   
subcutaneous   
injection once   
daily                                   Saxenda 18 MG/3ML   
Subcutaneous Solution   
Pen-injector inject 3   
MG subcutaneously once   
daily Quantity: 1   
Refills: 0 Ordered:   
5-Oct-2021 Savana Pedro DO Start :   
26-Oct-2020 Active  
   
                                                    meloxicam 15 mg oral   
tablet  
(6 sources)                             Nonsteroidal   
Anti-inflammatory   
Drug                                    Star  
t:   
  
22                                      take 1 tablet by   
mouth once daily                        Meloxicam 15 MG Oral   
Tablet TAKE 1 TABLET   
DAILY. Quantity: 90   
Refills: 3 Ordered:   
7-Mar-2022 Savana Pedro DO Start : 7-Mar-2022   
Active  
   
                                                    MV with   
Min-Lycopene-Lutein 0.4   
mg-300 mcg- 250 mcg tab  
(1 source)                                                      MV with   
Min-Lycopene-Lutein 0.4   
mg-300 mcg- 250 mcg tab   
Take by mouth. 0 Active  
   
                                        Comment on above:   Take by mouth.   
   
                                                    One Daily For Women 50+   
Adv Oral Tablet  
(16 sources)                                        Star  
t:   
  
19                                      take 1 tablet by   
mouth once daily                        One Daily For Women 50+   
Adv Oral Tablet   
Quantity: 0 Refills: 0   
Ordered: 22-Mar-2019 DO   
Start : 22-Mar-2019   
Active  
   
                                                    pantoprazole 40 mg   
delayed release oral   
tablet  
(11 sources)                            Proton Pump   
Inhibitor                               Star  
t:   
  
14                                      take 1 capsule   
by mouth once   
daily                                   Pantoprazole Sodium 40   
MG Oral Tablet Delayed   
Release TAKE 1 CAPSULE   
DAILY. Quantity: 90   
Refills: 3 Ordered:   
2020 Savana Pedro DO Start : 2014   
Active  
   
                                                    Potassium  
(1 source)                                                      Potassium 20 mg   
Chew   
Take by mouth. 0 Active  
   
                                        Comment on above:   Take by mouth.   
   
                                                    potassium chloride 8   
meq extended release   
oral tablet  
(17 sources)                                        Star  
t:   
02-0  
  
18                                      take 1 tablet by   
mouth once daily                        Klor-Con 8 MEQ Oral   
Tablet Extended Release   
TAKE 1 TABLET DAILY.   
Quantity: 90 Refills: 3   
Ordered: 2020   
Savana Pedro DO Start   
: 2018 Active  
   
                                                    predniSONE 10 mg oral   
tablet  
(4 sources)                                         Star  
t:   
100  
  
21                                      take 1 tablet by   
mouth twice   
daily                                   predniSONE 10 MG Oral   
Tablet TAKE 1 TABLET   
TWICE DAILY. Quantity:   
14 Refills: 1 Ordered:   
6-Oct-2021 Savana Pedro DO Start : 6-Oct-2021   
Active  
   
                                                    traMADol hydrochloride   
50 mg disintegrating   
oral tablet  
(1 source)      Opioid Agonist                                  traMADol 50 mg T  
bDL   
Take by mouth. 0 Active  
   
                                        Comment on above:   Take by mouth.   
   
                                                    ursodiol 300 mg oral   
capsule  
(6 sources)               Bile Acid                 Star  
t:   
  
22                                      take 1 capsule   
by mouth twice   
daily                                   Ursodiol 300 MG Oral   
Capsule TAKE 1 CAPSULE   
BY MOUTH TWICE A DAY   
Quantity: 60 Refills: 0   
Ordered: 2022 DO   
Start : 2022   
Active  
  
  
  
Problems  
Active Problems  
  
  
                                                    Problem   
Classification  Problem         Date            Documented Date Episodic/Chronic  
   
                                                    Acquired foot   
deformities  
(16 sources)                            Acquired deformity of   
toe; Translations:   
[Other hammer toe   
(acquired)]                                                 Chronic  
   
                                                    Acquired foot   
deformities  
(1 source)                              Acquired deformity of   
toe; Translations:   
[Acquired hammertoes   
of both feet]                                               Episodic  
   
                                                    Acute bronchitis  
(12 sources)                            Acute bronchitis;   
Translations: [Acute   
bronchitis]                                                 Episodic  
   
                                                    Anxiety disorders  
(11 sources)                            Anxiety; Translations:   
[Anxiety state,   
unspecified]                                                Chronic  
   
                                                    Diabetes mellitus   
without complication  
(17 sources)                            Hyperglycemia;   
Translations: [Other   
abnormal glucose]                                           Episodic  
   
                                                    Disorders of lipid   
metabolism  
(17 sources)                            Hyperlipidemia;   
Translations: [Other   
and unspecified   
hyperlipidemia]                                             Chronic  
   
                                                    Esophageal disorders  
(17 sources)                            Gastroesophageal   
reflux disease;   
Translations:   
[Esophageal reflux]                                         Chronic  
   
                                                    Essential   
hypertension  
(14 sources)                            Benign essential   
hypertension;   
Translations: [Benign   
essential   
hypertension]                                               Chronic  
   
                                        Comment on above:   Added by Problem Viviana anaya Migration; 2013; Moved to   
Suppressed   
2013 9:01PM;   
   
                                                    Miscellaneous mental   
health disorders  
(17 sources)                            Primary insomnia;   
Translations:   
[Insomnia]                                                  Chronic  
   
                                                    Osteoarthritis  
(17 sources)                            Osteoarthritis of   
knee; Translations:   
[Osteoarthrosis,   
localized, not   
specified whether   
primary or secondary,   
lower leg]                                                  Chronic  
   
                                                    Other diseases of   
veins and lymphatics  
(17 sources)                            Chronic venous   
hypertension   
(idiopathic) without   
complications of left   
lower extremity;   
Translations: [Edema   
of left lower limb]                                         Chronic  
   
                                                    Other nervous system   
disorders  
(17 sources)                            Carpal tunnel   
syndrome;   
Translations: [Carpal   
tunnel syndrome]                                            Chronic  
   
                                                    Other nervous system   
disorders  
(16 sources)                            Left leg peripheral   
neuropathy;   
Translations:   
[Mononeuritis of lower   
limb, unspecified]                                          Chronic  
   
                                                    Other screening for   
suspected conditions   
(not mental   
disorders or   
infectious disease)  
(16 sources)                            Patient encounter   
status; Translations:   
[Other screening   
mammogram]                                                  Episodic  
   
                                                    Other upper   
respiratory   
infections  
(1 source)                              Acute upper   
respiratory infection;   
Translations: [Acute   
upper respiratory   
infection,   
unspecified]                            Onset:   
2022                                          Episodic  
   
                                                    Retinal detachments;   
defects; vascular   
occlusion; and   
retinopathy  
(17 sources)                            Degenerative disorder   
of macula ;   
Translations: [Macular   
degeneration (senile),   
unspecified]                                                Chronic  
  
  
Past or Other Problems  
  
  
                      Problem Classification Problem    Date       Documented Da  
te Episodic/Chronic  
   
                                                    Hepatitis  
(17 sources)                            Chronic hepatitis   
C; Translations:   
[Chronic hepatitis   
C without mention   
of hepatic coma]                          
Resolved:   
2016                                          Chronic  
   
                                                    Other nervous system   
disorders  
(1 source)                              Left leg   
peripheral   
neuropathy;   
Translations:   
[Neuropathy of   
left foot]                                                    
   
                                                    Other skin disorders  
(17 sources)                            H/O: skin   
disorder;   
Translations:   
[Personal history   
of other   
infectious and   
parasitic   
diseases]                                 
Resolved:   
08-                                          Episodic  
   
                                                    Substance-related   
disorders  
(18 sources)                            Opioid dependence;   
Translations:   
[Sedative   
dependence]                               
Resolved:   
2019                                          Chronic  
   
                                                    Syncope  
(17 sources)                            Vasovagal syncope;   
Translations:   
[Syncope and   
collapse]                                 
Resolved:   
08-                                          Episodic  
   
                                                    NEGATED: Highlighted   
row has not   
occurred!Residual   
codes; unclassified  
(5 sources)     Disease                                         Episodic  
  
  
  
Results  
  
  
                          Test Name    Value        Interpretation Reference   
[facility]                                   [facility]  
   
                                                    [facility] Physician Progress   
Noteon [id number] [place] [address] Physician   
Progress Note                           MARC DECKER  
:1955  
MRN:[id number]  
FIN:[id number]  
Registration   
Date:2024  
Chief Complaint  
New patient - referred by   
her mother who is a   
patient here. c/o L groin   
discomfort, swelling from   
L thigh to foot x a couple   
months.. Elevation of her   
leg does not offer relief.   
Had hammer toe surgery to   
R foot 2024.  
  
History of Present Illness  
Marc Decker presents to   
vascular surgery office as   
a new patient for left   
groin pain and swelling   
from thigh to foot.  
She has a venous duplex   
report obtained from   
outside facility from   
2024 that shows deep   
veins of the left lower   
extremity patent and   
compressible, no DVT.  
She sustained a left heel   
injury after hitting it on   
a storm door and developed   
MRSA in the wound a few   
months ago which was   
treated. She noted left   
foot swelling beginning   
about a month prior to the   
heel injury. Swelling   
worse at the end of the   
day, better when legs   
elevated. The left leg   
swelling has progressed   
and now involves the   
entire leg. Left lower leg   
with tight skin, describes   
medial left groin pain,   
has not noticed any   
bulging, no recent injury   
or trauma. No new   
medications. Takes   
amlodipine for HTN. She   
has gained about 60 pounds   
since 2018 and is   
contemplating taking   
medication for weight   
loss. She follows with   
primary care, is   
up-to-date on recommended   
cancer screenings. History   
of hysterectomy, reports   
last colonoscopy was   
unremarkable.  
Onset: Several months ago   
starting with left foot   
and progressing   
approximately  
Pain/aching: Yes,   
specially to left groin  
Cramping: Yes  
Tired legs/fatigue: Yes,   
left worse than right  
Edema: Yes, left leg  
Heaviness: Yes  
Restless legs: No  
Itching: Yes  
Numbness of lower   
extremities: Left foot  
Skin Changes: Tight and   
scaly left lower leg  
Compression stocking use   
and duration: No-has not   
been able to tolerate in   
the past  
Past medical history:  
HTN  
Varicose veins  
Neuropathy  
Hepatitis C (treated for   
cure)  
Kidney stones  
No-DVT, PE,   
hypercoagulability,   
diabetes, back/spinal   
problems, CVA/TIA, carotid   
artery disease, aneurysm   
disease, PAD, heart   
disease, lung disease  
Past surgical history:  
Left knee surgery  
Right knee surgery  
Hysterectomy  
Colonoscopy  
Procedure for kidney   
stones x 3  
Toe surgery right   
hammertoe 2024  
Family history of venous   
disease:  
Grandmother  
Mother-iliac vein   
compression-stent with Dr Liu  
Social History:  
Smoker: No  
Alcohol: No  
Illicit: No  
  
Review of Systems  
Constitutional: Feels   
well, no fever, no chills  
Respiratory: No shortness   
of breath  
Cardiovascular: No chest   
pain, no palpitations  
Gastrointestinal: No   
nausea, no vomiting, no   
abdominal discomfort  
Peripheral Vascular: Left   
leg cramps, numbness  
Neurological: No   
dizziness, no problems   
with attention or   
coordination  
Musculoskeletal: No   
limitation of movement, no   
muscle or joint pain  
Integumentary: No open   
areas or wounds, scaly   
skin to left lower leg  
  
Physical Exam  
Vitals & Measurements  
Systolic Blood Pressure:   
152 mmHg High (24   
10:32:00)  
Diastolic Blood Pressure:   
87 mmHg (09/11/24   
10:32:00)  
Temperature Oral (F): 98.6   
degF (09/11/24 10:32:00)  
SpO2: 95 % (09/11/24   
10:32:00)  
Peripheral Pulse Rate: 96   
bpm (09/11/24 10:32:)  
Mean Arterial Pressure:   
109 mmHg (09/11/24   
10:32:00)  
BP Site2: Right arm   
(09/11/24 10:32:00)  
Height/Length Measured:   
178 cm (09/11/24 10:32:00)  
Weight Measured: 119 kg   
(09/11/24 10:32:00)  
Body Mass Index Measured:   
37.56 kg/m2 (09/11/24   
10:32:00)  
Weight Measured - lbs2:   
262 lb (09/11/24 10:32:00)  
Height/Length Measured -   
in2: 70 in (09/11/24   
10:32:)  
Body Mass Index Measured   
English2: 37.59 kg/m2   
(09/11/24 10:32:00)  
BSA: 2.42 m2 (09/11/24   
10:32:00)  
Ht/Wt Measurement Refused   
by Patient?2: No (09/11/24   
10:32:)  
  
Depression Screening   
Scores  
Initial Depression Screen   
Score: 0 (09/11/24   
10:32:)  
  
Fall Risk Assessment  
Is the patient ambulatory   
(mobile): Yes (24   
10:32:00)  
Have you had a fall within   
the past: No (09/11/24   
10:32:)  
Have you had 2 or more   
falls in the past: No   
(24 10:32:00)  
  
General: Awake and alert,   
no distress, pleasant  
Skin: Warm and dry, intact  
Respiratory: Respirations   
quiet and unlabored on   
room air, lungs CTA  
Cardiovascular: Normal   
rate, regular rhythm  
Extremities: Temperature   
and color symmetric,   
palpable bilateral distal   
pulses to lower   
extremities. Left leg with   
generalized edema, worse   
to distal leg. Pitting,   
hard edema to left lower   
leg with reddened skin.   
Skin intact, no wounds.  
Musculoskeletal: ROM   
intact and symmetric  
Neurological: Alert and   
oriented to person, place,   
and time, speech normal   
and fluent, strength   
symmetric, sensation   
intact  
Psychological: Appropriate   
mood and affect  
Patient Instructions  
Please schedule the venous   
reflux test  
Begin to wear compression   
stockings during the   
daytime and remove them   
for showers and bedtime   
only  
20-30 mmHg Knee High   
Compression is preferred,   
but you can try any   
between 10-20 mmHg  
Compression st (more   
content not included)... Normal                                  [facility]  
   
                                                    Ambulatory Clinical Summaryo  
n 2024   
   
                                                    Ambulatory Clinical   
Summary                                 DECKERMRAC GARCIA ILIANA  
:1955  
MRN:[id number]  
FIN:[id number]  
Registration   
Date:2024  
Ambulatory Visit   
Instructions  
Your Diagnosis  
Left leg swelling  
  
Your Care Team  
Attending Physician -  
Vascular , [facility]  
Primary Care Physician -  
NO FAMILY PHYSICIAN, [phone]  
Procedures Performed  
toe surgery (2024)  
Both knee Surgery  
Colonoscopy  
Hysterectomy  
nephrolithiasis  
  
Discharge Vitals  
Temperature (Oral)  
98.6 DEGF  
Heart Rate (Peripheral)  
96  
Blood Pressure  
[Image Removed]152/87  
Height  
70.08 in (178 cm)  
Weight  
262.40 lb (119 kg)  
BMI  
37.56  
Systolic Blood Pressure:   
152 mmHg High (24   
10:32:00)  
Diastolic Blood Pressure:   
87 mmHg (24   
10:32:00)  
Temperature Oral (F): 98.6   
degF (24 10:32:00)  
SpO2: 95 % (24   
10:32:00)  
Peripheral Pulse Rate: 96   
bpm (24 10:32:00)  
Mean Arterial Pressure:   
109 mmHg (24   
10:32:00)  
BP Site2: Right arm   
(24 10:32:00)  
Height/Length Measured:   
178 cm (24 10:32:00)  
Weight Measured: 119 kg   
(24 10:32:00)  
Body Mass Index Measured:   
37.56 kg/m2 (24   
10:32:00)  
Weight Measured - lbs2:   
262 lb (24 10:32:00)  
Height/Length Measured -   
in2: 70 in (24   
10:32:00)  
Body Mass Index Measured   
English2: 37.59 kg/m2   
(24 10:32:00)  
BSA: 2.42 m2 (24   
10:32:00)  
Ht/Wt Measurement Refused   
by Patient?2: No (24   
10:32:00)  
What to do next  
Instructions From Your   
Doctor  
Please schedule the venous   
reflux test  
Begin to wear compression   
stockings during the   
daytime and remove them   
for showers and bedtime   
only  
20-30 mmHg Knee High   
Compression is preferred,   
but you can try any   
between 10-20 mmHg  
Compression stores:  
[employer],   
[employer], [employer], [place]   
Trading company  
Brand names: [employer]   
(compression engineering   
products), [employer],   
[employer], [employer]  
Or  
Any Running or Nursing   
Uniform Store store  
Measures to promote vein   
health:  
1. Regular use of   
compression stockings  
2. Elevating the feet and   
legs (above the level of   
your heart) for 10-20   
minutes whenever possible   
and ideally every 2-4   
hours  
3. Walking- to improve the   
muscle pump of the calf  
4. Avoid trauma to   
varicose veins  
5. Maintenance of an ideal   
weight and weight   
reduction if you are   
overweight  
6. Avoid prolonged   
standing or sitting  
7. Consult with vascular   
or wound care at the first   
signs of a skin ulcer or   
cellulitis  
8. Maintaining healthy   
skin by using   
emollients/creams/lotions  
  
Scheduled Follow-Up   
Appointments  
Appointment Type  
Reason for visit  
Day  
With  
Date  
Time  
Where  
[facility]  
GI Follow Up - 30  
Refill on medication  
Wednesday  
Janette Shah CNP  
2024  
02:00 pm EDT  
[place] [id number]  
[facility] [phone]   
ZIP:[id number]  
  
  
Medications  
What How Much When   
Instructions  
Unchanged alirocumab   
(Praluent Pen 75 mg/ mL   
subcutaneous solution) 1   
Milliliter Subcutaneous   
EVERY FOURTEEN DAYS rotate   
injection sites  
Unchanged amLODIPine   
(amLODIPine 5 mg oral   
tablet) 1 Tabs Oral DAILY  
Unchanged chlorthalidone =   
Hygroton (chlorthalidone   
25 mg oral tablet) 1 Tabs   
Oral DAILY  
Unchanged evolocumab   
(Repatha SureClick 140 mg/   
mL subcutaneous solution)   
Subcutaneous EVERY   
FOURTEEN DAYS  
Unchanged multivitamin 1   
Tabs Oral DAILY  
Unchanged ondansetron   
(Zofran 4 mg oral tablet)   
1 Tabs Oral EVERY EIGHT   
HOURS  
Unchanged potassium   
chloride (KCL) (Klor-Con   
M20 oral tablet, extended   
release) 1 Tabs Oral DAILY  
Unchanged semaglutide   
(Wegovy (0.25 mg dose)   
subcutaneous solution)   
0.25 Milligram   
Subcutaneous MONDAY   
Duration: 4 Weeks in the   
abdomen, thigh, or upper   
arm  
Unchanged semaglutide   
(Wegovy (0.5 mg dose)   
subcutaneous solution) 0.5   
Milligram Subcutaneous   
MONDAY Duration: 4 Weeks   
in the abdomen, thigh, or   
upper arm  
Unchanged ursodiol   
(ursodiol 300 mg oral   
capsule) 1 Capsules Oral   
TWICE A DAY  
Unchanged vitamin E   
(vitamin E 1000 intl units   
oral capsule) 1 Capsules   
Oral DAILY  
  
Allergies  
Cipro Dizzy Nausea  
penicillin Hive  
  
Problems  
Ongoing - Any problem that   
you are currently   
receiving treatment for.  
Left leg swelling  
  
Common Emergency Awareness   
Tips  
IS IT A STROKE?  
Act FAST and Check for   
these signs:  
FACE  
Does the face look uneven?  
ARM  
Does one arm drift down?  
SPEECH  
Does their speech sound   
strange?  
TIME  
Call  at any sign of   
stroke  
  
Heart Attack Signs  
Chest discomfort: Most   
heart attacks involve   
discomfort in the center   
of the chest and lasts   
more than a few minutes,   
or goes away and comes   
back. It can feel like   
uncomfortable pressure,   
squeezing, fullness or   
pain.  
Discomfort in upper body:   
Symptoms can include pain   
or discomfort in one or   
both arms, back, neck, jaw   
or stomach.  
Shortness of breath: With   
or without discomfort.  
Other signs: Breaking out   
in a cold sweat, nausea,   
or lightheaded.  
Remember, MINUTES DO   
MATTER. If you experience   
any of these heart attack   
warning signs, call    
to get immediate medical   
attention!  
Electronically S (more   
content not included)... Normal                                  [facility]  
   
                                                    Ambulatory Clinical   
Summary                                 MARC DECKER  
:1955  
MRN:[id number]  
FIN:[id number]  
Registration   
Date:2024  
Ambulatory Visit   
Instructions  
Your Diagnosis  
Left leg swelling  
  
Your Care Team  
Attending Physician -  
Vascular , [facility]  
Primary Care Physician -  
NO FAMILY PHYSICIAN, [phone]  
Procedures Performed  
toe surgery (2024)  
Both knee Surgery  
Colonoscopy  
Hysterectomy  
nephrolithiasis  
  
Discharge Vitals  
Temperature (Oral)  
98.6 DEGF  
Heart Rate (Peripheral)  
96  
Blood Pressure  
[Image Removed]152/87  
Height  
70.08 in (178 cm)  
Weight  
262.40 lb (119 kg)  
BMI  
37.56  
Systolic Blood Pressure:   
152 mmHg High (24   
10:32:00)  
Diastolic Blood Pressure:   
87 mmHg (24   
10:32:00)  
Temperature Oral (F): 98.6   
degF (24 10:32:00)  
SpO2: 95 % (24   
10:32:00)  
Peripheral Pulse Rate: 96   
bpm (09/11/24 10:32:00)  
Mean Arterial Pressure:   
109 mmHg (09/11/24   
10:32:00)  
BP Site2: Right arm   
(09/11/24 10:32:00)  
Height/Length Measured:   
178 cm (24 10:32:00)  
Weight Measured: 119 kg   
(24 10:32:00)  
Body Mass Index Measured:   
37.56 kg/m2 (24   
10:32:00)  
Weight Measured - lbs2:   
262 lb (09/11/24 10:32:00)  
Height/Length Measured -   
in2: 70 in (09/11/24   
10:32:00)  
Body Mass Index Measured   
English2: 37.59 kg/m2   
(24 10:32:00)  
BSA: 2.42 m2 (24   
10:32:00)  
Ht/Wt Measurement Refused   
by Patient?2: No (24   
10:32:00)  
What to do next  
Instructions From Your   
Doctor  
Please schedule the venous   
reflux test  
Begin to wear compression   
stockings during the   
daytime and remove them   
for showers and bedtime   
only  
20-30 mmHg Knee High   
Compression is preferred,   
but you can try any   
between 10-20 mmHg  
Compression stores:  
[employer],   
[employer], [employer], [employer]   
Trading company  
Brand names: [employer]   
(compression engineering   
products), [employer],   
[employer], [employer]  
Or  
Any Running or Nursing   
Uniform Store store  
Measures to promote vein   
health:  
1. Regular use of   
compression stockings  
2. Elevating the feet and   
legs (above the level of   
your heart) for 10-20   
minutes whenever possible   
and ideally every 2-4   
hours  
3. Walking- to improve the   
muscle pump of the calf  
4. Avoid trauma to   
varicose veins  
5. Maintenance of an ideal   
weight and weight   
reduction if you are   
overweight  
6. Avoid prolonged   
standing or sitting  
7. Consult with vascular   
or wound care at the first   
signs of a skin ulcer or   
cellulitis  
8. Maintaining healthy   
skin by using   
emollients/creams/lotions  
  
Scheduled Follow-Up   
Appointments  
Appointment Type  
Reason for visit  
Day  
With  
Date  
Time  
Where  
[facility]  
GI Follow Up - 30  
Refill on medication  
Wednesday  
Janette Shah CNP  
2024  
02:00 pm EDT  
[facility]  
[address]  
[address]  
[facility] [phone]   
ZIP:[id number]  
  
  
Medications  
What How Much When   
Instructions  
Unchanged alirocumab   
(Praluent Pen 75 mg/ mL   
subcutaneous solution) 1   
Milliliter Subcutaneous   
EVERY FOURTEEN DAYS rotate   
injection sites  
Unchanged amLODIPine   
(amLODIPine 5 mg oral   
tablet) 1 Tabs Oral DAILY  
Unchanged chlorthalidone =   
Hygroton (chlorthalidone   
25 mg oral tablet) 1 Tabs   
Oral DAILY  
Unchanged evolocumab   
(Repatha SureClick 140 mg/   
mL subcutaneous solution)   
Subcutaneous EVERY   
FOURTEEN DAYS  
Unchanged multivitamin 1   
Tabs Oral DAILY  
Unchanged ondansetron   
(Zofran 4 mg oral tablet)   
1 Tabs Oral EVERY EIGHT   
HOURS  
Unchanged potassium   
chloride (KCL) (Klor-Con   
M20 oral tablet, extended   
release) 1 Tabs Oral DAILY  
Unchanged semaglutide   
(Wegovy (0.25 mg dose)   
subcutaneous solution)   
0.25 Milligram   
Subcutaneous MONDAY   
Duration: 4 Weeks in the   
abdomen, thigh, or upper   
arm  
Unchanged semaglutide   
(Wegovy (0.5 mg dose)   
subcutaneous solution) 0.5   
Milligram Subcutaneous   
MONDAY Duration: 4 Weeks   
in the abdomen, thigh, or   
upper arm  
Unchanged ursodiol   
(ursodiol 300 mg oral   
capsule) 1 Capsules Oral   
TWICE A DAY  
Unchanged vitamin E   
(vitamin E 1000 intl units   
oral capsule) 1 Capsules   
Oral DAILY  
  
Allergies  
Cipro Dizzy Nausea  
penicillin Hive  
  
Problems  
Ongoing - Any problem that   
you are currently   
receiving treatment for.  
Left leg swelling  
  
Common Emergency Awareness   
Tips  
IS IT A STROKE?  
Act FAST and Check for   
these signs:  
FACE  
Does the face look uneven?  
ARM  
Does one arm drift down?  
SPEECH  
Does their speech sound   
strange?  
TIME  
Call  at any sign of   
stroke  
  
Heart Attack Signs  
Chest discomfort: Most   
heart attacks involve   
discomfort in the center   
of the chest and lasts   
more than a few minutes,   
or goes away and comes   
back. It can feel like   
uncomfortable pressure,   
squeezing, fullness or   
pain.  
Discomfort in upper body:   
Symptoms can include pain   
or discomfort in one or   
both arms, back, neck, jaw   
or stomach.  
Shortness of breath: With   
or without discomfort.  
Other signs: Breaking out   
in a cold sweat, nausea,   
or lightheaded.  
Remember, MINUTES DO   
MATTER. If you experience   
any of these heart attack   
warning signs, call --1   
to get immediate medical   
attention!  
Electronically S (more   
content not included)... Normal                                  [facility]  
   
                                                    Comprehensive Intake - Texto  
n 2024   
   
                                                    Comprehensive Intake   
- Text                                  Comprehensive Intake   
Entered On: 2024   
10:41 EDT  
Performed On: 2024   
10:32 EDT by Flori Desai RN  
  
  
  
  
Summary  
Chief Complaint : New   
patient - referred by her   
mother who is a patient   
here. c/o L groin   
discomfort, swelling from   
L thigh to foot x a couple   
months.. Elevation of her   
leg does not offer relief.   
Had hammer toe surgery to   
R foot 2024.  
Bladder Control Issues? :   
No  
Urine Leakage? : No  
Presence or absence of   
urinary incontinence   
assessed : Yes  
CPT-II Medication list   
doc'd in medical record :   
Yes  
Influenza immunization   
administered or previously   
received : Yes  
Pneumococcal vaccine   
administered or previously   
received : No  
Flori Desai RN -   
2024 10:32 EDT  
Measurements  
Ht/Wt Measurement Refused   
by Patient? : No  
Weight Measured : 119   
kg(Converted to: 262 lb 6   
oz, 262.350 lb)  
Height/Length Measured :   
178 cm(Converted to: 5 ft   
10 in, 70.08 in)  
Body Mass Index Measured :   
37.56 kg/m2  
Body Mass Index documented   
: Yes  
Weight Measured - lbs :   
262 lb(Converted to: 262   
lb 0 oz, 119 kg)  
Height/Length Measured -   
in : 70 in(Converted to: 5   
ft 10 in, 178 cm)  
Body Mass Index Measured   
English : 37.59 kg/m2  
BSA English : 2.42 m2  
Flori Desai RN -   
2024 10:32 EDT  
Vitals  
Require BP : Yes  
Systolic Blood Pressure :   
152 mmHg (HI)  
Diastolic Blood Pressure :   
87 mmHg  
Mean Arterial Pressure :   
109 mmHg  
Pulse Rate : 96 bpm  
BP Site : Right arm  
Last Systolic BP : greater   
than or equal to 140 mmHg  
Last Diastolic BP : 80-89   
mmHg  
Temperature Oral (F) :   
98.6 degF(Converted to: 37   
degC)  
SpO2 : 95 %  
Pain Present : No actual   
or suspected pain  
Pain : 8  
Pain severity quantified :   
No pain present  
Primary Pain Comments : Flori Davies RN -   
2024 10:32 EDT  
Infection Screening  
Travel outside [place] within   
past 21 days : No  
Positive COVID test in the  
last 10 days? : No  
Exposure to and/or close   
contact  
with a person who has a  
laboratory-confirmed COVID   
test within the last 48   
hours. : Flori Good RN -   
2024 10:32 EDT  
Depression Screening  
Is patient currently :   
None of the Below  
Feeling Down, Depressed,   
Hopeless : Not at all  
Little Interest - Pleasure   
in Activities : Not at all  
Initial Depression Screen   
Score : 0  
Depression Screening Score   
0 : No  
Flori Desai RN -   
2024 10:32 EDT  
Social History  
Social History  
(As Of: 2024   
10:41:08 EDT)  
Alcohol: Denies Alcohol   
Use  
(Last Updated: 2023   
10:43:01 EDT by Cheli Ortega MA )  
  
Tobacco: Denies Tobacco   
Use  
Never (less than 100 in   
lifetime) Tobacco Use:.   
Cigarettes (Last Updated:   
2024 10:22:33 EDT by   
Flori Desai RN)  
  
Falls Risk Assessment  
Is the patient ambulatory   
(mobile) : Yes  
Have you had 2 or more   
falls in the past year :   
No  
Have you had a fall within   
the past year that has   
caused an injury : No  
Patient screen for fall   
risk : no falls in last   
year OR 1 fall with no   
injury in last year  
Flori Desai RN -   
2024 10:32 EDT Normal                                  [facility]  
   
                                                    AMB GI Physician Progress No  
alycia 2024   
   
                                                    MAGGIE GI Physician   
Progress Note                           MARC DECKER ILIANA  
:1955  
MRN:[id number]  
FIN:[id number]  
Registration   
Date:2023  
Chief Complaint  
FUV with Weight loss  
  
History of Present Illness  
Patient here today for   
weight loss. PMH includes   
fatty liver and ? PBC. She   
is taking ursodiolol   
daily. She is following   
with Dr. Keith in   
[place]. She has been   
seeing him for fatty   
liver. Patient has tried   
topiramate and addipex in   
the past but was unable to   
tolerate due to side   
effects. She is following   
a low carb diet. she feels   
she has issues with both   
cravings and portion   
sizes. She works nights.   
she reports that she eats   
when she gets home. She   
works from 5-10 PM. she   
works as a . She   
will eat a cereal bar and   
oatmilk with herbal tea.   
she does eat out 1-2 times   
a week. Only drinks herbal   
tea and water and juice.   
She is not sleeping well.   
She is not exercising with   
her knee pain.  
24 hour diet recall  
Breakfast: skips  
Lunch: chicken breast with   
vegetables  
dinner: hamburger with no   
bread and rice  
  
Physical Exam  
Vitals & Measurements  
Systolic Blood Pressure:   
145 mmHg High (23   
10:17:00)  
Diastolic Blood Pressure:   
97 mmHg High (07/14/23   
10:17:)  
Temperature Temporal (F):   
98.8 degF (07/14/23   
10::00)  
Peripheral Pulse Rate: 90   
bpm (23 10:17:00)  
Mean Arterial Pressure:   
113 mmHg (23   
10::00)  
Height/Length Measured:   
180 cm (23 10:17:00)  
Weight Measured: 112 kg   
(23 10:17:00)  
Body Mass Index Measured:   
34.57 kg/m2 (23   
10:17:00)  
Weight Measured - lbs2:   
246 lb (23 10:17:00)  
Height/Length Measured -   
in2: 71 in (23   
10::)  
Body Mass Index Measured   
English2: 34.31 kg/m2   
(23 10:17:00)  
BSA: 2.36 m2 (07/14/23   
10:17:00)  
Ht/Wt Measurement Refused   
by Patient?2: No (23   
10:17:)  
  
Depression Screening   
Scores  
No Depression Screening   
data available for this   
encounter.  
  
Fall Risk Assessment  
No Falls Risk Assessment   
data available for this   
encounter.  
  
  
Medication Reconciliation  
What How Much When   
Instructions  
Unchanged ondansetron   
(Zofran 4 mg oral tablet)   
1 Tabs Oral EVERY EIGHT   
HOURS  
Unchanged semaglutide   
(Wegovy (0.25 mg dose)   
subcutaneous solution)   
0.25 Milligram   
Subcutaneous MONDAY   
Duration: 4 Weeks in the   
abdomen, thigh, or upper   
arm  
Unchanged semaglutide   
(Wegovy (0.5 mg dose)   
subcutaneous solution) 0.5   
Milligram Subcutaneous   
MONDAY Duration: 4 Weeks   
in the abdomen, thigh, or   
upper arm  
Unchanged alirocumab   
(Praluent Pen 75 mg/ mL   
subcutaneous solution) 1   
Milliliter Subcutaneous   
EVERY FOURTEEN DAYS rotate   
injection sites Contact   
prescribing physician if   
questions or concerns  
Unchanged amLODIPine   
(amLODIPine 5 mg oral   
tablet) 1 Tabs Oral DAILY   
Contact prescribing   
physician if questions or   
concerns  
Unchanged chlorthalidone =   
Hygroton (chlorthalidone   
25 mg oral tablet) 1 Tabs   
Oral DAILY Contact   
prescribing physician if   
questions or concerns  
Unchanged potassium   
chloride (KCL) (Klor-Con   
M20 oral tablet, extended   
release) 1 Tabs Oral DAILY   
Contact prescribing   
physician if questions or   
concerns  
Unchanged ursodiol   
(ursodiol 300 mg oral   
capsule) 1 Capsules Oral   
TWICE A DAY Contact   
prescribing physician if   
questions or concerns  
Unchanged vitamin E   
(vitamin E 1000 intl units   
oral capsule) 1 Capsules   
Oral DAILY Contact   
prescribing physician if   
questions or concerns  
  
Assessment/Plan  
This Visit Diagnosis  
1. Obesity E66.9  
Goals for next visit for   
Marc:  
  
1. START Mediterranean   
diet  
2. Try to increase amount   
of exercise to 3-4 times a   
week for at least 30 mins   
at a time  
3. Try to work on aiming   
for 6-8 hours of sleep a   
night  
4. Start WEGOVY  
Patient to follow up in   
4-6 weeks  
2. BMI 34.0-34.9,adult   
Z68.34  
  
Procedure/Surgical History  
Hysterectomy  
Both knee Surgery  
Colonoscopy  
  
Medications  
alirocumab(Praluent Pen 75   
mg/mL subcutaneous   
solution), 75 mg= 1 mL,   
Subcutaneous, [id number]  
amLODIPine(amLODIPine 5 mg   
oral tablet), 5 mg= 1   
tabs, ORAL, DAILY  
chlorthalidone =   
Hygroton(chlorthalidone 25   
mg oral tablet), 25 mg= 1   
tabs, ORAL, DAILY  
ondansetron(Zofran 4 mg   
oral tablet), 4 mg= 1   
tabs, ORAL, [id number]  
potassium chloride   
(KCL)(Klor-Con M20 oral   
tablet, extended release),   
20 mEq= 1 tabs, ORAL,   
DAILY  
semaglutide(Wegovy (0.5 mg   
dose) subcutaneous   
solution), 0.5 mg,   
Subcutaneous, MONDAY  
semaglutide(Wegovy (0.25   
mg dose) subcutaneous   
solution), 0.25 mg,   
Subcutaneous, MONDAY  
ursodiol(ursodiol 300 mg   
oral capsule), 300 mg= 1   
caps, ORAL, BID  
vitamin E(vitamin E 1000   
intl units oral capsule),   
1000 intl_unit= 1 caps,   
ORAL, DAILY  
  
Allergies  
Cipro Dizzy Nausea  
penicillin Hive  
  
Social History  
Alcohol - Denies Alcohol   
Use  
Tobacco - Denies Tobacco   
Use  
  
Family History  
Breast cancer..:   
Grandmother.  
Colon cancer..: Father and   
Grandfather.  
Health Status Family   
Member(s)  
  
Immunizations  
Vaccine Date Status  
SARS-CoV-2 (COVID-19,   
ROSAURA) Ad26 vacc   
2021 Recorded  
Comments : 2023:   
TPV60  
zoster vaccine,   
inactivated 10/03/2020   
Recorded  
influenza virus vaccine,   
inact (more content not   
included)...        Normal                                  [facility]  
   
                                                    Phone [employer] 10-   
   
                                        Phone Msg           --------------------  
-  
From: Jessica Nagel MA  
To: JANETTE SHAH CNP;  
Sent: 10/13/2023 12:25:58   
EDT  
Caller Name: MARC DECKER; Caller Number: H   
[phone]  
FYI  
Pt called. Unable to get   
her medication Wegovy.  
She cx her appt. for next   
week.  
She was told from the   
pharmacy to contact our   
office and ask for   
samples.  
Spoke with patient and   
advised that there are no   
samples to provide. And   
that there is a good   
possibility that her   
strength will not be   
available until the new   
year. I did advise that   
she can try to call around   
to different pharmacies to   
see if they might have any   
on hand.  
Patient calling states she   
is still unable to find   
Wegovy. Asking if she can   
be prescribed a different   
medication.  
Electronically Signed by:  
Brianne Brenner MA  
on 2023 10:32 EST Normal                                  [facility]  
   
                                                    Phone [employer] 10-   
   
                                        Phone Msg           --------------------  
-  
From: Laxmi Navas  
To: JANETTE SHAH CNP;  
Sent: 2023 16:28:26   
EDT  
Caller Name: MARC DECKER; Caller Number: H   
[phone]  
FYI Pt called she finally   
was able to get her Wegovy   
from [facility]. She states   
that you ordered her   
enough for a month, but   
she has to pay the same   
co-pay for one month as   
she would have to pay for   
a 90 supply. I tried to   
tell her the dose usually   
changes from month to   
month but she wanted me to   
let you know  
fuv 10/18, will discuss   
then                Normal                                  [facility]  
   
                                                    Phone Msgon 2023   
   
                                        Phone Msg           --------------------  
-  
From: Little Avery MA  
To: JANETTE SHAH CNP;  
Sent: 2023 15:50:07   
EDT  
Subject: Med Management  
Caller Name: MARC DECKER; Caller Number: H   
[phone]  
** On hold pending   
signature **  
Order:semaglutide (Wegovy   
(0.5 mg dose) subcutaneous   
solution) 0.5 mg   
Subcutaneous MONDAY in the   
abdomen, thigh, or upper   
arm  
Qty: 2 mL Duration: 4   
weeks Refills: 0  
Substitutions Allowed   
Route To Pharmacy -   
[id number]  
  
Marc is requesting her   
next dose of the Wegovy   
0.5  
** Approved **  
Order:semaglutide (Wegovy   
(0.5 mg dose) subcutaneous   
solution) 0.5 mg   
Subcutaneous MONDAY in the   
abdomen, thigh, or upper   
arm  
Qty: 2 mL Duration: 4   
weeks Refills: 0  
Substitutions Allowed   
Route To Pharmacy -   
[id number]  
Signed by JANETTE SHAH CNP 2023 15:51:00   
EDT                 Normal                                  [facility]  
   
                                                    MRI SPINE CERVICAL W/O CONTR  
Amanda 2022   
   
                                                    MRI SPINE CERVICAL   
W/O CONTRAST                            ORIGINAL  
EXAMINATION:  
MRI cervical spine without   
intravenous contrast.  
  
HISTORY:  
ORDERING SYSTEM PROVIDED   
HISTORY:  
Reason for Exam: M54.2,   
M99.01. Cervicalgia,   
segmental and somatic  
dysfunction of cervical   
region. Numbness in front   
of neck and hands.  
  
TECHNIQUE:  
Multiplanar multisequence   
MRI of the cervical spine   
was performed without the  
administration of   
intravenous contrast.  
  
MRI examination of the   
cervical spine was   
obtained utilizing the   
sequences:  
Sagittal T1 weighted, and   
axial T2 weighted and   
axial GRE images.  
  
COMPARISON:  
None.  
  
FINDINGS:  
Alignment: Straightening   
of the physiologic   
cervical lordosis. Minimal  
degenerative   
anterolisthesis of C3 on   
C4 and C7 on T1. Minimal  
retrolisthesis of C5 on C6   
and C6 on C7.  
  
Vertebrae: No fracture or   
vertebral body height   
loss. Multilevel  
degenerative vertebral   
body height loss and   
endplate disc osteophyte  
complexes most prominent   
at C5 and C6. Mild facet   
and uncovertebral joint  
arthropathy.  
  
Spinal cord: No abnormal   
signal changes.  
  
Discs: Multilevel disc   
height loss.  
  
C2-C3: No stenosis.  
  
C3-C4: Moderate spinal   
canal stenosis. Moderate   
bilateral foraminal   
stenosis.  
  
C4-C5: Mild spinal canal   
stenosis. Moderate   
bilateral foraminal   
stenosis.  
  
C5-C6 : Superimposed left   
central/subarticular   
extrusion results in   
moderate  
spinal canal stenosis.   
Moderate to severe right   
and moderate left   
stenosis.  
  
C6-C7: Moderate spinal   
canal stenosis. Moderate   
to severe right and   
moderate  
left stenosis.  
  
C7-T1 : Mild spinal canal   
and bilateral foraminal   
stenosis.  
  
Paraspinal soft tissues:   
Mild cervicothoracic   
paraspinal atrophy on the   
left.  
  
Visualized posterior   
fossa: Slight contouring   
of the left ventral   
medulla  
secondary to the left   
vertebral artery flow   
void. No significant  
abnormality. Minimal   
opacification of bilateral   
mastoid air cells.  
  
Visualized neck: No   
significant abnormality.   
Specifically, major   
vascular T2  
flow voids are maintained.  
  
IMPRESSION:  
Moderate degenerative   
spinal canal stenosis   
C3-C4, C5-C6, and C6-C7.  
  
Foraminal stenosis   
(moderate bilateral C3-4   
and C4-5, moderate to   
severe  
right and moderate left   
C5-6 and C6-7).  
  
I have personally reviewed   
the images of this   
examination and agree with   
the  
resident's findings and   
interpretation.  
  
Interpreted by: Marcos Marie  
Preliminary Report By:   
Ulices Woodard  
Electronically signed By   
Marcos Marie  
Dictated Date: 2022   
9:54:34 AM  
Prelim Date: 2022   
5:22:42 PM  
Sign Date: 2022   
5:22:42 PM  
Ordering Provider: DONY Mccoy                                  [facility]   
([place])  
   
                                                    .Auto Diffon 2022   
   
                      Basophil, Absolute 0.0 10 3/mcL Normal     0.0-0.2    [facility]   
([place])  
   
                                        Comment on above:   Performed By: #### G  
FR, CMP, PBNP, TROPHS ####  
[address] [address]   
   
                                                    Basophils/100 WBC   
(Bld)           0.6 %           Normal          0.0-2.5         [facility]   
([place])  
   
                                        Comment on above:   Performed By: #### G  
FR, CMP, PBNP, TROPHS ####  
[address] [address]   
   
                      Eosinophil, Absolute 0.2 10 3/mcL Normal     0.0-0.4    [facility]   
([place])  
   
                                        Comment on above:   Performed By: #### G  
FR, CMP, PBNP, TROPHS ####  
[address] [address]   
   
                                                    Eosinophils/100 WBC   
(Bld)           2.7 %           Normal          0.0-7.0         [facility]   
([place])  
   
                                        Comment on above:   Performed By: #### G  
FR, CMP, PBNP, TROPHS ####  
[address] [address]   
   
                      Lymphocyte, Absolute 2.2 10 3/mcL Normal     0.8-3.9    [facility]   
([place])  
   
                                        Comment on above:   Performed By: #### G  
FR, CMP, PBNP, TROPHS ####  
[address] [address]   
   
                                                    Lymphocytes/100 WBC   
(Bld)           26.8 %          Normal          10.0-50.0       [facility]   
([place])  
   
                                        Comment on above:   Performed By: #### G  
FR, CMP, PBNP, TROPHS ####  
[address] [address]   
   
                      Monocyte, Absolute 0.6 10 3/mcL Normal     0.2-1.0    [facility]   
([place])  
   
                                        Comment on above:   Performed By: #### G  
FR, CMP, PBNP, TROPHS ####  
[address] [address]   
   
                                                    Monocytes/100 WBC   
(Bld)           6.9 %           Normal          1.7-13.0        [facility]   
([place])  
   
                                        Comment on above:   Performed By: #### G  
FR, CMP, PBNP, TROPHS ####  
[facility] [place]  
[address] [place], [place] [address]   
   
                                                    Neutrophils/100 WBC   
(Bld)           63.0 %          Normal          37.0-80.0       [facility]   
([place])  
   
                                        Comment on above:   Performed By: #### G  
FR, CMP, PBNP, TROPHS ####  
[facility] [place] [address] [place], [place] [address]   
   
                                                    .GFRon 2022   
   
                                                    GFR Non-   
American        66 ml/min/1.73sqm Normal                          [facility]   
([place])  
   
                                        Comment on above:   Result Comment:  
GFR Population mean for , Non- Americans  
Ages 20-29 = 116 mL/min/1.73 sq.m.  
Ages 30-39 = 107 mL/min/1.73 sq.m.  
Ages 40-49 = 99 mL/min/1.73 sq.m.  
Ages 50-59 = 93 mL/min/1.73 sq.m.  
Ages 60-69 = 85 mL/min/1.73 sq.m.  
Ages 70+ = 75 mL/min/1.73 sq.m.  
Chronic Kidney Disease: Less than 60 mL/min/1.73 square meters  
End Stage Renal Disease: Less than 15 mL/min/1.73 square meters   
   
                                                            Performed By: #### G  
FR, CMP, PBNP, TROPHS ####[facility] [id number]   
[place], [place] [address]   
   
                      GFR African American 80 ml/min/1.73sqm Normal               
   [facility]   
([place])  
   
                                        Comment on above:   Result Comment:  
GFR Population mean for , Non- Americans  
Ages 20-29 = 116 mL/min/1.73 sq.m.  
Ages 30-39 = 107 mL/min/1.73 sq.m.  
Ages 40-49 = 99 mL/min/1.73 sq.m.  
Ages 50-59 = 93 mL/min/1.73 sq.m.  
Ages 60-69 = 85 mL/min/1.73 sq.m.  
Ages 70+ = 75 mL/min/1.73 sq.m.  
Chronic Kidney Disease: Less than 60 mL/min/1.73 square meters  
End Stage Renal Disease: Less than 15 mL/min/1.73 square meters   
   
                                                            Performed By: #### G  
FR, CMP, PBNP, TROPHS ####[address] [address]   
   
                                                    .MDWon 2022   
   
                                                    Monocyte   
Distribution Width 20.92           High            0.00-20.00      [facility]   
([place])  
   
                                        Comment on above:   Result Comment: For   
adults in ED, MDW>20.0 may be associated   
with   
a higher risk of sepsis during the first 12hrs of hospital   
admission   
   
                                                            Performed By: #### G  
FR, CMP, PBNP, TROPHS ####  
[place] [address]   
   
                                                    .NEUABSon 2022   
   
                      Neutrophil, Absolute 5.1 10 3/mcL Normal     2.9-6.2    [facility]   
([place])  
   
                                        Comment on above:   Performed By: #### G  
FR, CMP, PBNP, TROPHS ####  
[place] [address]   
   
                                                    CBCon 2022   
   
                                                    Erythrocyte   
distribution width   
(RBC) [Ratio]   12.7 %          Normal          11.5-14.5       [facility]   
([place])  
   
                                        Comment on above:   Performed By: #### JEFF  
FR, CMP, PBNP, TROPHS ####  
[place] [address]   
   
                                                    Hematocrit (Bld)   
[Volume fraction] 44.0 %          Normal          37.0-47.0       [facility]   
([place])  
   
                                        Comment on above:   Performed By: #### JEFF  
FR, CMP, PBNP, TROPHS ####  
[place] [address]   
   
                      Hgb        15.4 G/dL  Normal     12.0-16.0  [facility]   
([place])  
   
                                        Comment on above:   Performed By: #### G  
FR, CMP, PBNP, TROPHS ####  
[place] [address]   
   
                                                    MCH (RBC) [Entitic   
mass]           30.1 pg         Normal          27.0-31.2       [facility]   
([place])  
   
                                        Comment on above:   Performed By: #### G  
FR, CMP, PBNP, TROPHS ####  
[place] [id number]   
   
                      MCHC       35.0 G/dL  Normal     33.0-37.0  [facility]   
([place])  
   
                                        Comment on above:   Performed By: #### G  
FR, CMP, PBNP, TROPHS ####  
[address] [address]   
   
                                                    MCV (RBC) [Entitic   
vol]            86.1 fL         Normal          80.0-94.0       [facility]   
([place]  
   
                                        Comment on above:   Performed By: #### G  
FR, CMP, PBNP, TROPHS ####  
[address] [address]   
   
                      Platelet   271 10 3/mcL Normal     130-400    [facility]   
([place])  
   
                                        Comment on above:   Performed By: #### G  
FR, CMP, PBNP, TROPHS ####  
[address] [address]   
   
                                                    Platelet mean volume   
(Bld) [Entitic vol] 7.7 fL          Normal          7.4-10.4        [facility]   
([place]  
   
                                        Comment on above:   Performed By: #### G  
FR, CMP, PBNP, TROPHS ####  
[address] [address]   
   
                      RBC        5.11 10 6/mcL Normal     4.20-5.40  [facility]   
([place]  
   
                                        Comment on above:   Performed By: #### G  
FR, CMP, PBNP, TROPHS ####  
[address] [address]   
   
                      WBC        8.1 10 3/mcL Normal     4.6-10.8   [facility]   
([place]  
   
                                        Comment on above:   Performed By: #### G  
FR, CMP, PBNP, TROPHS ####  
[address] [address]   
   
                                                    CMPon 2022   
   
                      Albumin Level 3.3 G/dL   Low        3.4-4.8    [facility]   
([place]  
   
                                        Comment on above:   Performed By: #### G  
FR, CMP, PBNP, TROPHS ####  
[address] [address]   
   
                                                    Albumin/Globulin   
[Mass ratio]    0.9 {ratio}     Low             1.1-2.5         [facility]   
([place]  
   
                                        Comment on above:   Performed By: #### G  
FR, CMP, PBNP, TROPHS ####  
[address] [address]   
   
                                                    ALP [Catalytic   
activity/Vol]   103 U/L         Normal                    [facility]   
([place]  
   
                                        Comment on above:   Performed By: #### G  
FR, CMP, PBNP, TROPHS ####  
[address] [address]   
   
                                                    ALT [Catalytic   
activity/Vol]   38 U/L          Normal          14-59           [facility]   
([place]  
   
                                        Comment on above:   Performed By: #### G  
FR, CMP, PBNP, TROPHS ####  
[address] [address]   
   
                                                    AST [Catalytic   
activity/Vol]   24 U/L          Normal          10-40           [facility]   
([place]  
   
                                        Comment on above:   Performed By: #### G  
FR, CMP, PBNP, TROPHS ####  
[address] [address]   
   
                      Bili Total 0.3 mg/dL  Normal     0.2-1.0    [facility]   
([place]  
   
                                        Comment on above:   Result Comment: Use   
of this assay is not recommended for   
patients   
undergoing  
treatment with eltrombopag due to the potential for falsely   
elevated  
results.   
   
                                                            Performed By: #### G  
FR, CMP, PBNP, TROPHS ####  
[address] [address]   
   
                      BUN/Creatinine Ratio 13 ratio   Normal     7-27       [facility]   
([place])  
   
                                        Comment on above:   Performed By: #### G  
FR, CMP, PBNP, TROPHS ####  
[address] [address]   
   
                      Calcium [Mass/Vol] 9.2 mg/dL  Normal     8.4-10.2   [facility]   
([place])  
   
                                        Comment on above:   Performed By: #### G  
FR, CMP, PBNP, TROPHS ####  
[address] [address]   
   
                      Chloride [Moles/Vol] 102 mmol/L Normal          [facility]   
([place]  
   
                                        Comment on above:   Performed By: #### G  
FR, CMP, PBNP, TROPHS ####  
[address] [address]   
   
                      CO2 [Moles/Vol] 32 mmol/L  High       23-31      [facility]   
([place]  
   
                                        Comment on above:   Performed By: #### G  
FR, CMP, PBNP, TROPHS ####  
[place] [id number]   
   
                                                    Creatinine   
[Mass/Vol]      0.86 mg/dL      Normal          0.55-1.02       [facility]   
([place]  
   
                                        Comment on above:   Performed By: #### G  
FR, CMP, PBNP, TROPHS ####  
[place] [address]   
   
                      Electrolyte Balance 9.0 mEq/L  Normal     4.0-15.0   [facility]   
([place])  
   
                                        Comment on above:   Performed By: #### G  
FR, CMP, PBNP, TROPHS ####  
[place] [address]   
   
                      Globulin   3.5 G/dL   Normal                [facility]   
([place]  
   
                                        Comment on above:   Performed By: #### G  
FR, CMP, PBNP, TROPHS ####  
[place] [address]   
   
                      Glucose [Mass/Vol] 149 mg/dL  High            [facility]   
([place]  
   
                                        Comment on above:   Performed By: #### G  
FR, CMP, PBNP, TROPHS ####  
[place] [address]   
   
                                                    Potassium   
[Moles/Vol]     3.8 mmol/L      Normal          3.5-5.1         [facility]   
([place]  
   
                                        Comment on above:   Performed By: #### G  
FR, CMP, PBNP, TROPHS ####  
[place] [address]   
   
                      Sodium [Moles/Vol] 143 mmol/L Normal     136-145    [facility]   
([place]  
   
                                        Comment on above:   Performed By: #### G  
FR, CMP, PBNP, TROPHS ####  
[address] [address]   
   
                      Total Protein 6.8 G/dL   Normal     6.4-8.2    [facility]   
([place]  
   
                                        Comment on above:   Performed By: #### G  
FR, CMP, PBNP, TROPHS ####  
[address] [address]   
   
                                                    Urea nitrogen   
[Mass/Vol]      11 mg/dL        Normal          7-18            [facility]   
([place]  
   
                                        Comment on above:   Performed By: #### G  
FR, CMP, PBNP, TROPHS ####  
[address] [address]   
   
                                                    ALHG70me 2022   
   
                                                    SARS-CoV-2   
(COVID-19) RNA   
ADRYAN+probe Ql (Unsp   
spec)           Negative        Normal          Negative        [facility]   
([place])  
   
                                        Comment on above:   Performed By: #### C  
OVD19, FLURSV ####  
[address] [address]   
   
                                                    SARS-CoV-2   
(COVID-19) RNA   
ADRYAN+probe Ql (Unsp   
spec)                           Normal                          [facility]   
([place])  
   
                                        Comment on above:   Result Comment: Nega  
tive results do not preclude SARS-CoV-2   
infection and should not be used as the sole basis for patient   
management decisions. Negative results must be combined with   
clinical observations, patient history, and epidemiological   
information.  
There is a risk of false negative values resulting from   
improperly collected, transported, or handled specimens.  
There is a risk of false negative values due to the presence of   
sequence variants in the pathogen targets of the assay,   
procedural errors, amplification inhibitors in specimens, or   
inadequate numbers of organisms for amplification.  
GUANAKO SARS-CoV-2 Assay is a Real-Time reverse-transcriptase   
polymerase chain reaction (RT-PCR) based qualitative in vitro   
diagnostic test intended for the qualitative detection of nucleic   
acid from the SARS-CoV-2 in nasopharyngeal swab specimens   
collected from individuals suspected of COVID-19 by their   
healthcare provider. Testing is limited to laboratories certified   
under the Clinical Laboratory Improvement Amendments of 1988   
(CLIA), 42 U.S.C. ?263a, to perform moderate and high complexity   
tests.  
COVID-19 Int   
   
                                                            Performed By: #### C  
OVD19, FLURSV ####  
[address] [address]   
   
                                                    DIMERon 2022   
   
                      D-Dimer    223 ng/mL D-DU Normal     0-230      [facility]   
([place])  
   
                                        Comment on above:   Order Comment: hemol  
yzed   
   
                                                            Result Comment: The   
result of the D-Dimer test should be   
evaluated in the  
context of all the clinical and laboratory data available.  
In those instances where the laboratory result does not  
agree with the clinical evaluation, additional tests should  
be performed accordingly.  
If the D-Dimer result is used to exclude DVT or PE, the  
recommended cutoff value is less than 230 ng/mL. The D-Dimer  
result should not be used alone to rule in DVT/PE, but should  
be used in conjunction with a clinical pretest probability  
(PTP)assessment model to exclude venous thromboembolism (VTE)  
in outpatients suspected of deep venous thrombosis (DVT) and  
pulmonary embolism (PE).   
   
                                                            Performed By: #### D  
NISREEN ####  
[id number] [place], [place] [address]   
   
                                                    FLURSVon 2022   
   
                      Flu A PCR (AO) Negative   Normal     Negative   [facility]   
([place])  
   
                                        Comment on above:   Result Comment: Posi  
tive Results: Positive Flu A/B or RSV for   
by   
PCR. Positive test results do not rule out bacterial infection or   
co-infection with other pathogens. Test results should be   
interpreted in conjunction with other laboratory and clinical   
data.  
Negative Results: Negative for by PCR. Negative test results do   
not preclude influenza virus or RSV infection and should not be   
used as the sole basis for diagnosis, treatment, or other   
management decisions. There is a risk of false negative RSV   
results when at low concentration and in the presence of   
co-infection with high concentration of influenza A.  
Invalid Results: An Invalid result (INV) was obtained. The test   
was repeated with similar results. REPEAT COLLECTION AND TESTING   
IS RECOMMENDED.  
The [employer] Flu A/B & RSV Assay is a real-time polymerase chain   
reaction (PCR) based qualitative in vitro diagnostic test for the   
direct detection and differentiation of influenza A virus,   
influenza B virus, and respiratory syncytial virus (RSV) nucleic   
acid in nasopharyngeal swab (NPS) specimens from patients with   
signs and symptoms of respiratory infection in conjunction with   
clinical and laboratory findings. The test is intended for use as   
an aid in the differential diagnosis of influenza A virus,   
influenza B virus, and RSV in humans and is not intended to   
detect influenza C.   
   
                                                            Performed By: #### C  
[id number], FLURSV ####  
[id number] [place], [place] [address]   
   
                      Flu B PCR (AO) Negative   Normal     Negative   [facility]   
([place])  
   
                                        Comment on above:   Result Comment: Posi  
tive Results: Positive Flu A/B or RSV for   
by   
PCR. Positive test results do not rule out bacterial infection or   
co-infection with other pathogens. Test results should be   
interpreted in conjunction with other laboratory and clinical   
data.  
Negative Results: Negative for by PCR. Negative test results do   
not preclude influenza virus or RSV infection and should not be   
used as the sole basis for diagnosis, treatment, or other   
management decisions. There is a risk of false negative RSV   
results when at low concentration and in the presence of   
co-infection with high concentration of influenza A.  
Invalid Results: An Invalid result (INV) was obtained. The test   
was repeated with similar results. REPEAT COLLECTION AND TESTING   
IS RECOMMENDED.  
The [employer] Flu A/B & RSV Assay is a real-time polymerase chain   
reaction (PCR) based qualitative in vitro diagnostic test for the   
direct detection and differentiation of influenza A virus,   
influenza B virus, and respiratory syncytial virus (RSV) nucleic   
acid in nasopharyngeal swab (NPS) specimens from patients with   
signs and symptoms of respiratory infection in conjunction with   
clinical and laboratory findings. The test is intended for use as   
an aid in the differential diagnosis of influenza A virus,   
influenza B virus, and RSV in humans and is not intended to   
detect influenza C.   
   
                                                            Performed By: #### C  
OVD19, FLURSV ####  
[address] [address]   
   
                      RSV PCR (AO) Negative   Normal     Negative   [facility]   
([place])  
   
                                        Comment on above:   Result Comment: Posi  
tive Results: Positive Flu A/B or RSV for   
by   
PCR. Positive test results do not rule out bacterial infection or   
co-infection with other pathogens. Test results should be   
interpreted in conjunction with other laboratory and clinical   
data.  
Negative Results: Negative for by PCR. Negative test results do   
not preclude influenza virus or RSV infection and should not be   
used as the sole basis for diagnosis, treatment, or other   
management decisions. There is a risk of false negative RSV   
results when at low concentration and in the presence of   
co-infection with high concentration of influenza A.  
Invalid Results: An Invalid result (INV) was obtained. The test   
was repeated with similar results. REPEAT COLLECTION AND TESTING   
IS RECOMMENDED.  
The [employer] Flu A/B & RSV Assay is a real-time polymerase chain   
reaction (PCR) based qualitative in vitro diagnostic test for the   
direct detection and differentiation of influenza A virus,   
influenza B virus, and respiratory syncytial virus (RSV) nucleic   
acid in nasopharyngeal swab (NPS) specimens from patients with   
signs and symptoms of respiratory infection in conjunction with   
clinical and laboratory findings. The test is intended for use as   
an aid in the differential diagnosis of influenza A virus,   
influenza B virus, and RSV in humans and is not intended to   
detect influenza C.   
   
                                                            Performed By: #### C  
[id number], FLURSV ####  
[address] [place] [address]   
   
                                                    LABORATORYOrdered By: Jennifer Osuna on 2022   
   
                                                    Fibrin D-dimer DDU   
(PPP) [Mass/Vol]          223 ng/mL D-DU            Invalid   
Interpretation   
Code                                    0 - 230   
ng/mL D-DU                              AO Coag SS  
   
                                                    LABORATORYOrdered By: SYSTEM  
 SYSTEM on 2022   
   
                                                    Albumin BCP dye   
[Mass/Vol]                3.3 G/dL                  Invalid   
Interpretation   
Code                                    3.4 - 4.8   
G/dL                                    AO ADM SS  
   
                                                    Albumin/Globulin   
[Mass ratio]              0.9 {ratio}               Invalid   
Interpretation   
Code                                    1.1 - 2.5   
ratio                                   AO ADM SS  
   
                                                    ALP [Catalytic   
activity/Vol]             103 U/L                   Invalid   
Interpretation   
Code                                    40 - 135   
U/L                                     AO ADM SS  
   
                                                    ALT With P-5'-P   
[Catalytic   
activity/Vol]             38 U/L                    Invalid   
Interpretation   
Code                      14 - 59 U/L               AO ADM SS  
   
                                                    AST With P-5'-P   
[Catalytic   
activity/Vol]             24 U/L                    Invalid   
Interpretation   
Code                      10 - 40 U/L               AO ADM SS  
   
                                Bilirubin [Mass/Vol] 0.3 mg/dL       Invalid   
Interpretation   
Code                                    0.2 - 1.0   
mg/dL                                   AO ADM SS  
   
                                Calcium [Mass/Vol] 9.2 mg/dL       Invalid   
Interpretation   
Code                                    8.4 - 10.2   
mg/dL                                   AO ADM SS  
   
                                Chloride [Moles/Vol] 102 mmol/L      Invalid   
Interpretation   
Code                                    98 - 107   
mmol/L                                  AO ADM SS  
   
                                CO2 [Moles/Vol] 32 mmol/L       Invalid   
Interpretation   
Code                                    23 - 31   
mmol/L                                  AO ADM SS  
   
                                                    Creatinine   
[Mass/Vol]                0.86 mg/dL                Invalid   
Interpretation   
Code                                    0.55 - 1.02   
mg/dL                                   AO ADM SS  
   
                                Electrolyte Balance 9.0 mEq/L       Invalid   
Interpretation   
Code                                    4.0 - 15.0   
mEq/L                                   AO ADM SS  
   
                                GFR African American 80 ml/min/1.73sqm Invalid   
Interpretation   
Code                                                AO Chemistry   
S  
   
                                                    GFR Non-   
American                  66 ml/min/1.73sqm         Invalid   
Interpretation   
Code                                                AO Chemistry   
S  
   
                                Globulin        3.5 G/dL        Invalid   
Interpretation   
Code                                                AO ADM SS  
   
                                Glucose [Mass/Vol] 149 mg/dL       Invalid   
Interpretation   
Code                                    80 - 115   
mg/dL                                   AO ADM SS  
   
                                                    Natriuretic   
peptide.B prohormone   
N-Terminal   
[Mass/Vol]                56 pg/mL                  Invalid   
Interpretation   
Code                                    0 - 125   
pg/mL                                   AO ADM SS  
   
                                                    Potassium   
[Moles/Vol]               3.8 mmol/L                Invalid   
Interpretation   
Code                                    3.5 - 5.1   
mmol/L                                  AO ADM SS  
   
                                Protein [Mass/Vol] 6.8 G/dL        Invalid   
Interpretation   
Code                                    6.4 - 8.2   
G/dL                                    AO ADM SS  
   
                                Sodium [Moles/Vol] 143 mmol/L      Invalid   
Interpretation   
Code                                    136 - 145   
mmol/L                                  AO ADM SS  
   
                                                    Troponin I.cardiac   
DL <= 0.01 ng/mL   
[Mass/Vol]                5.4 ng/L                  Invalid   
Interpretation   
Code                                    0.0 - 51.4   
ng/L                                    AO ADM SS  
   
                                                    Urea nitrogen   
[Mass/Vol]                11 mg/dL                  Invalid   
Interpretation   
Code                                    7 - 18   
mg/dL                                   AO ADM SS  
   
                                                    Urea   
nitrogen/Creatinine   
[Mass ratio]              13 ratio                  Invalid   
Interpretation   
Code                                    7 - 27   
ratio                                   AO ADM SS  
   
                                                    LABORATORYOrdered By: Jaylin Martinez on 2022   
   
                                Basophil, Absolute 0.0 103/mcL     Invalid   
Interpretation   
Code                                    0.0 - 0.2   
10^3/mcL                                AO Workflow   
SS  
   
                                                    Basophils/100 WBC   
(Bld)                     0.6 %                     Invalid   
Interpretation   
Code                      0.0 - 2.5 %               AO Workflow   
SS  
   
                                Eosinophil, Absolute 0.2 103/mcL     Invalid   
Interpretation   
Code                                    0.0 - 0.4   
10^3/mcL                                AO Workflow   
SS  
   
                                                    Eosinophils/100 WBC   
(Bld)                     2.7 %                     Invalid   
Interpretation   
Code                      0.0 - 7.0 %               AO Workflow   
SS  
   
                                                    Erythrocyte   
distribution width   
(RBC) [Ratio]             12.7 %                    Invalid   
Interpretation   
Code                                    11.5 - 14.5   
%                                       AO Workflow   
SS  
   
                                                    Hematocrit (Bld)   
[Volume fraction]         44.0 %                    Invalid   
Interpretation   
Code                                    37.0 - 47.0   
%                                       AO Workflow   
SS  
   
                                                    Hemoglobin (Bld)   
[Mass/Vol]                15.4 G/dL                 Invalid   
Interpretation   
Code                                    12.0 - 16.0   
G/dL                                    AO Workflow   
SS  
   
                                Lymphocyte, Absolute 2.2 103/mcL     Invalid   
Interpretation   
Code                                    0.8 - 3.9   
10^3/mcL                                AO Workflow   
SS  
   
                                                    Lymphocytes/100 WBC   
(Bld)                     26.8 %                    Invalid   
Interpretation   
Code                                    10.0 - 50.0   
%                                       AO Workflow   
SS  
   
                                                    MCH (RBC) [Entitic   
mass]                     30.1 pg                   Invalid   
Interpretation   
Code                                    27.0 - 31.2   
pg                                      AO Workflow   
SS  
   
                                MCHC            35.0 G/dL       Invalid   
Interpretation   
Code                                    33.0 - 37.0   
G/dL                                    AO Workflow   
SS  
   
                                                    MCV (RBC) [Entitic   
vol]                      86.1 fL                   Invalid   
Interpretation   
Code                                    80.0 - 94.0   
fL                                      AO Workflow   
SS  
   
                                                    Monocyte   
distribution width   
Auto (Bld) [Entitic   
vol]                      20.92                     Invalid   
Interpretation   
Code                                    0.00 -   
20.00                                   AO Workflow   
SS  
   
                                        Comment on above:   Result Comment: For   
adults in ED, MDW>20.0 may be associated   
with   
a higher risk of sepsis during the first 12hrs of hospital   
admission   
   
                                Monocyte, Absolute 0.6 103/mcL     Invalid   
Interpretation   
Code                                    0.2 - 1.0   
10^3/mcL                                AO Workflow   
SS  
   
                                                    Monocytes/100 WBC   
(Bld)                     6.9 %                     Invalid   
Interpretation   
Code                                    1.7 - 13.0   
%                                       AO Workflow   
SS  
   
                                Neutrophil, Absolute 5.1 103/mcL     Invalid   
Interpretation   
Code                                    2.9 - 6.2   
10^3/mcL                                AO Workflow   
SS  
   
                                                    Neutrophils/100 WBC   
(Bld)                     63.0 %                    Invalid   
Interpretation   
Code                                    37.0 - 80.0   
%                                       AO Workflow   
SS  
   
                                                    Platelet mean volume   
(Bld) [Entitic vol]       7.7 fL                    Invalid   
Interpretation   
Code                                    7.4 - 10.4   
fL                                      AO Workflow   
SS  
   
                                                    Platelets (Bld)   
[#/Vol]                   271 103/mcL               Invalid   
Interpretation   
Code                                    130 - 400   
10^3/mcL                                AO Workflow   
SS  
   
                                RBC (Bld) [#/Vol] 5.11 106/mcL    Invalid   
Interpretation   
Code                                    4.20 - 5.40   
10^6/mcL                                AO Workflow   
SS  
   
                                WBC (Bld) [#/Vol] 8.1 103/mcL     Invalid   
Interpretation   
Code                                    4.6 - 10.8   
10^3/mcL                                AO Workflow   
SS  
   
                                                    LABORATORYOrdered By: Olga Siddiqui on 2022   
   
                                                    FLUAV RNA ADRYAN+probe   
Ql (Upper resp)                         Negative  
(22 5:55 PM)                      Invalid   
Interpretation   
Code                      Negative                  AO Auto   
Urine SS  
   
                                                    FLUBV RNA ADRYAN+probe   
Ql (Upper resp)                         Negative  
(22 5:55 PM)                      Invalid   
Interpretation   
Code                      Negative                  AO Auto   
Urine SS  
   
                                                    RSV RNA ADRYAN+probe Ql   
(Upper resp)                            Negative  
(22 5:55 PM)                      Invalid   
Interpretation   
Code                      Negative                  AO Auto   
Urine SS  
   
                                                    SARS-CoV-2   
(COVID-19) RNA   
ADRYAN+probe Ql (Resp)                     Negative results do not   
preclude SARS-CoV-2   
infection and should not   
be used as the sole basis   
for patient management   
decisions. Negative   
results must be combined   
with clinical   
observations, patient   
history, and   
epidemiological   
information.There is a   
risk of false negative   
values resulting from   
improperly collected,   
transported, or handled   
specimens.There is a risk   
of false negative values   
due to the presence of   
sequence variants in the   
pathogen targets of the   
assay, procedural errors,   
amplification inhibitors   
in specimens, or   
inadequate numbers of   
organisms for   
amplification.[employer]   
SARS-CoV-2 Assay is a   
Real-Time   
reverse-transcriptase   
polymerase chain reaction   
(RT-PCR) based qualitative   
in vitro diagnostic test   
intended for the   
qualitative detection of   
nucleic acid from the   
SARS-CoV-2 in   
nasopharyngeal swab   
specimens collected from   
individuals suspected of   
COVID-19 by their   
healthcare provider.   
Testing is limited to   
laboratories certified   
under the Clinical   
Laboratory Improvement   
Amendments of 1988 (CLIA),   
42 [place] 263a, to perform   
moderate and high   
complexity tests.                       Invalid   
Interpretation   
Code                                                AO Auto   
Urine SS  
   
                                                    PBNPon 2022   
   
                                                    Natriuretic peptide   
B (Bld) [Mass/Vol] 56 pg/mL        Normal          0-125           [facility]   
([place])  
   
                                        Comment on above:   Result Comment: NT-p  
roBNP results of less than 300 pg/mL   
effectively  
rules out acute congestive heart failure with 99%  
negative predictive value.   
   
                                                            Performed By: #### G  
FR, CMP, PBNP, TROPHS ####[id number]   
[place], [place] [address]   
   
                                                    [facility] 2022   
   
                                                    Troponin I High   
Sensitivity     5.4 ng/L        Normal          0.0-51.4        [facility]   
([place])  
   
                                        Comment on above:   Performed By: #### G  
FR, CMP, PBNP, TROPHS ####[address]   
[id number]   
[place], [place] [address]   
   
                                                    XR CHEST 1 VIEWon 2022  
   
   
                                        XR CHEST 1 VIEW     ORIGINAL  
EXAMINATION:  
ONE XRAY VIEW OF THE CHEST  
  
2022 6:29 pm  
  
COMPARISON:  
None.  
  
HISTORY:  
ORDERING SYSTEM PROVIDED   
HISTORY:  
Reason for Exam: chest   
pain  
  
FINDINGS:  
Normal cardiomediastinal   
contours. There is   
elevation of the right  
hemidiaphragm with right   
basilar atelectasis versus   
airspace disease. No  
vascular congestion,   
pleural effusion, or   
pneumothorax. There are  
degenerative changes   
within the shoulders and   
spine.  
  
IMPRESSION:  
Elevation of the right   
hemidiaphragm with right   
basilar atelectasis versus  
airspace disease.  
  
I have personally reviewed   
the images of this   
examination and agree with   
the  
resident's findings and   
interpretation.  
  
Interpreted by: Osvaldo Alejandra  
Preliminary Report By:   
Madison Choudhary  
Electronically signed By   
Osvaldo Alejandra  
Dictated Date: 2022   
6:54:19 PM  
Prelim Date: 2022   
6:55:25 PM  
Sign Date: 2022   
7:10:40 PM  
Ordering Provider: CALLUM JOHNS              [facility]   
([place])  
   
                                                    GURJITOVon 2022   
   
                                        CN                Office Visit ([facility]  
)  
--------------------------  
--  
MARC DECKER ([id number])   
1955 F  
Date Time Provider   
Department  
22 5:30 PM   
YUDELKA PEÑA   
[facility]  
During your visit today,   
we recorded the following   
information about you:  
Temperature Pulse   
Respiration Blood pressure  
97.5 degrees 100/minute   
18/minute 138/78  
Weight  
108.8 kg  
Yudelka Peña APRN.CNP 2022 5:32   
PM Signed  
Subjective  
HPI Marcbrandon Decker is a   
67 year old female who   
presents with cough,   
shortness  
of breath, chest pain,   
feeling dizzy and like she   
is going to pass out. This  
began on Thursday and is   
getting worse. She feels   
lightheaded during exam.  
Review of Systems  
Constitutional: Negative   
for chills and fever.  
Respiratory: Positive for   
cough and shortness of   
breath.  
Cardiovascular: Positive   
for chest pain.  
Gastrointestinal: Negative   
for nausea and vomiting.  
Neurological: Positive for   
dizziness and weakness.  
/78   Pulse 100     
Temp 36.4 ?C (97.5 ?F)     
Resp 18   Wt 108.8 kg  
(239 lb 12.8 oz)   SpO2   
97%  
No past medical history on   
file.  
No past surgical history   
on file.  
ALLERGIES Ciprofloxacin   
and Penicillins  
MEDICATIONS  
ursodiol (ACTIGALL) 300 mg   
capsule TAKE 1 CAPSULE BY   
MOUTH TWICE A DAY FOR  
FATTY LIVER  
EVOLOCUMAB (REPATHA   
SYRINGE SUBCUTANEOUS)   
Inject subcutaneously.   
Monthly  
injection  
Potassium 20 mg Chew Take   
by mouth.  
amLODIPine 5 mg tablet   
Take 5 mg by mouth once   
daily.  
chlorthalidone 25 mg   
tablet Take 25 mg by mouth   
once daily.  
Aspirin 81 mg CpDR Take by   
mouth.  
traMADol 50 mg TbDL Take   
by mouth. (Patient not   
taking: Reported on  
2022)  
MV with   
Min-Lycopene-Lutein 0.4   
mg-300 mcg- 250 mcg tab   
Take by mouth.  
(Patient not taking:   
Reported on 2022)  
No family history on file.  
Social History  
Tobacco Use  
Smoking status: Never  
Smokeless tobacco: Never  
Objective  
Physical Exam  
Vitals and nursing note   
reviewed.  
Constitutional:  
Appearance: Normal   
appearance.  
Cardiovascular:  
Rate and Rhythm: Normal   
rate and regular rhythm.  
Heart sounds: Normal heart   
sounds.  
Pulmonary:  
Effort: Pulmonary effort   
is normal. No respiratory   
distress.  
Breath sounds: Normal   
breath sounds. No wheezing   
or rales.  
Skin:  
General: Skin is warm and   
dry.  
Findings: No erythema or   
rash.  
Neurological:  
Mental Status: She is   
alert.  
ASSESSMENT/PLAN:  
1. SOB (shortness of   
breath) - ICD9: 786.05,   
ICD10: R06.02  
- due to patient stating   
multiple times during exam   
 I feel like I'm going to  
pass out  and  I can't   
catch my breath  she is   
referred to ER for further  
evaluation and treatment.   
Lab and xray are not open   
today, the day after  
. She verbalized   
understanding.  
DORA Hernandez.CNP  
Allergies As of Date:   
2022 Noted Allergy   
Reaction  
CIPROFLOXACIN 2018   
14 - Other: See Comments  
Comments: Burning in veins  
PENICILLINS 2012 4 -   
Hives  
Date Reviewed: 2022  
Reviewed by: Elaina Mckee MA - Fully   
Assessed  
Reason for Visit:  
Nausea AND Vomiting [237]   
Cmt: Diarrhea, cough, sob,   
dizzy x 2 weeks  
Primary Visit   
Diagnosis:SOB (shortness   
of breath) [R06.02]  
Prescriptions as of   
2022  
- ursodiol (ACTIGALL) 300   
mg capsule  
TAKE 1 CAPSULE BY MOUTH   
TWICE A DAY FOR FATTY   
LIVER  
- EVOLOCUMAB (REPATHA   
SYRINGE SUBCUTANEOUS)  
Inject subcutaneously.   
Monthly injection  
- Aspirin 81 mg CpDR  
Take by mouth.  
- Potassium 20 mg Chew  
Take by mouth.  
- amLODIPine 5 mg tablet  
Take 5 mg by mouth once   
daily.  
- traMADol 50 mg TbDL  
Take by mouth.  
- chlorthalidone 25 mg   
tablet  
Take 25 mg by mouth once   
daily.  
- MV with   
Min-Lycopene-Lutein 0.4   
mg-300 mcg- 250 mcg tab  
Take by mouth.  
Problem List As Of Date:   
2022  
(None)  
Encounter Number:   
[id number]  
Encounter Status:Closed by   
YUDELKA PEÑA on   
22            [facility]  
   
                                                    Donny 12-   
   
                                        CNPRODY                [facility]  
--------------------------  
--  
MARC DECKER ([id number])   
1955 F  
Date Time Provider   
Department  
12/15/22 LAXMI KRAUSE   
[facility]  
During your visit today,   
we recorded the following   
information about you:  
DORA Rucker.CNP   
12/15/2022 8:47 AM Signed  
Please notify of negative   
covid and influenza test.   
Continue comfort measures  
for symptoms as you would   
for a cold. Any worsening   
symptoms follow up with  
PCP or ER.  
KHADAR Rucker LPN   
12/15/2022 8:50 AM Signed  
Phone call placed, brief   
message to contact a nurse   
for results.  
Tara Montilla LPN   
12/15/2022 8:52 AM Signed  
LM for patient with   
results and   
recommendations.Petty Bentley RN   
12/15/2022 8:55 AM Signed  
Patient calls and notified   
of results and providers   
instructions. Patient  
verbalizes understanding.  
Lidia Bentley RN  
Allergies As of Date:   
12/15/2022 Noted Allergy   
Reaction  
CIPROFLOXACIN 2018   
14 - Other: See Comments  
Comments: Burning in veins  
PENICILLINS 2012 4 -   
Hives  
Date Reviewed: 2022  
Reviewed by: Zainab Garcia - Fully Assessed  
Reason for Visit:  
Results [95]  
Prescriptions as of   
12/15/2022  
- oseltamivir (TAMIFLU) 75   
mg capsule  
Take 1 capsule by mouth   
twice daily for 5 days.  
- EVOLOCUMAB (REPATHA   
SYRINGE SUBCUTANEOUS)  
Inject subcutaneously.   
Monthly injection  
- Aspirin 81 mg CpDR  
Take by mouth.  
- Potassium 20 mg Chew  
Take by mouth.  
- amLODIPine 5 mg tablet  
Take 5 mg by mouth once   
daily.  
- traMADol 50 mg TbDL  
Take by mouth.  
- chlorthalidone 25 mg   
tablet  
Take 25 mg by mouth once   
daily.  
- MV with   
Min-Lycopene-Lutein 0.4   
mg-300 mcg- 250 mcg tab  
Take by mouth.  
Problem List As Of Date:   
12/15/2022  
(None)  
Encounter Number:   
[id number]  
Encounter Status:Closed by   
PETTY MONTILLA LPN on   
12/15/22            [facility]  
   
                                                    CNOVon 2022   
   
                                        CNOV                Office Visit ([facility]  
)  
--------------------------  
--  
MARC DECKER ([id number])   
1955 F  
Date Time Provider   
Department  
22 2:30 PM   
YUDELKA PEÑA   
[facility]  
During your visit today,   
we recorded the following   
information about you:  
Temperature Pulse   
Respiration Blood pressure  
97.5 degrees 96/minute   
16/minute 132/80  
Weight  
109.3 kg  
Yudelka Peña APRN.CNP 2022 2:34   
PM Signed  
ASSESSMENT/PLAN:  
1. Sore throat - ICD9:   
462, ICD10: J02.9 (primary   
diagnosis)  
- suspect viral  
- Alere Strep Test   
NEGATIVE, no culture   
pending  
- Discussed supportive   
care treatment with   
fluids, rest and   
analgesia.  
- STREP A MOLECULAR (POC)  
2. Viral URI with cough -   
ICD9: 465.9, ICD10: J06.9  
- Discussed viral etiology   
and rationale for   
treatment.  
- Symptomatic treatment   
with prn analgesia  
- Supportive care with   
fluids and rest  
- COVID WITH FLUA+B,   
ROUTINE  
- OSELTAMIVIR 75 MG   
CAPSULE  
- Follow-up with your PCP   
in 3-5 days if symptoms   
have not improved or   
sooner  
if symptoms worsen  
- Discussed red flags and   
need for immediate medical   
evaluation if any occur.  
- Discussed supportive   
care treatment with   
fluids, rest and   
analgesia.  
- Discussed expected   
course of illness  
DORA Hernandez.CNP  
Treatment for Viral Upper   
Respiratory Tract   
Infections  
Your body will kill off   
the virus by itself.   
Additionally, you can   
prime your  
body's immune system. This   
may help you get better   
more quickly.  
Drink lots of fluids - at   
least one gallon of   
non-caffeinated liquids   
per day  
Make sure you are eating   
well  
Get plenty of rest  
We do not have any   
medications that kill off   
these viruses. Antibiotics   
are  
used to treat bacterial   
infections; however, they   
are not active against   
viral  
infections. There are some   
things that might help you   
feel better, though.  
Vaporizers, humidifiers,   
hot showers, and hot   
fluids help open   
respiratory and  
sinus passages  
Ocean Nasal Spray may   
offer relief of nasal and   
head congestion  
Israel's Vapor Rub may   
relieve congestion  
Tylenol and Advil help   
control fevers and   
headaches  
Salt water gargles help   
relieve sore throats  
Chloraceptic spray or   
throat lozenges may also   
help relieve sore throat   
symptoms  
Occasionally, viral   
infections turn into   
something more serious.   
You should  
see your doctor or return   
to the Urgent Care if:  
You have fevers for longer   
than five days  
You have fevers above 102   
degrees  
You are still sick after   
10 days  
You have shortness of   
breath or wheezing  
After several days you are   
getting worse rather than   
better  
DORA Hernandez.CNP 2022 2:53   
PM Signed  
Subjective  
HPI Marc Decker is a   
67 year old female who   
presents with 2 days of   
sore  
throat, headache, sinus   
pain, congestion, cough,   
body aches. She rates her   
sore  
throat 7/10. She has been   
taking tylenol and Zarbees   
cough syrup. She has not  
had a fever. She states   
her granddaughter has   
recently been ill with   
cold  
symptoms.  
Review of Systems  
Constitutional: Negative   
for fever.  
HENT: Positive for   
congestion and sore   
throat.  
Respiratory: Positive for   
cough. Negative for   
shortness of breath.  
Cardiovascular: Negative.  
Musculoskeletal: Positive   
for myalgias.  
Neurological: Positive for   
headaches.  
/80   Pulse 96     
Temp 36.4 ?C (97.5 ?F)     
Resp 16   Wt 109.3 kg (241  
lb)   SpO2 96%  
No past medical history on   
file.  
No past surgical history   
on file.  
ALLERGIES Ciprofloxacin   
and Penicillins  
MEDICATIONS  
EVOLOCUMAB (REPATHA   
SYRINGE SUBCUTANEOUS)   
Inject subcutaneously.   
Monthly  
injection  
Potassium 20 mg Chew Take   
by mouth.  
amLODIPine 5 mg tablet   
Take 5 mg by mouth once   
daily.  
MV with   
Min-Lycopene-Lutein 0.4   
mg-300 mcg- 250 mcg tab   
Take by mouth.  
oseltamivir (TAMIFLU) 75   
mg capsule Take 1 capsule   
by mouth twice daily for 5  
days.  
Aspirin 81 mg CpDR Take by   
mouth.  
traMADol 50 mg TbDL Take   
by mouth. (Patient not   
taking: Reported on  
2022)  
chlorthalidone 25 mg   
tablet Take 25 mg by mouth   
once daily.  
No family history on file.  
Social History  
Tobacco Use  
Smoking status: Never  
Smokeless tobacco: Never  
Objective  
Physical Exam  
Vitals and nursing note   
reviewed.  
Constitutional:  
Appearance: Normal   
appearance. She is obese.  
HENT:  
Right Ear: Tympanic   
membrane, ear canal and   
external ear normal.  
Left Ear: Tympanic   
membrane, ear canal and   
external ear normal.  
Nose: Congestion present.  
Mouth/Throat:  
Mouth: Mucous membranes   
are moist.  
Pharynx: Oropharynx is   
clear. Uvula midline.   
Posterior oropharyngeal  
erythema present. No   
oropharyngeal exudate.  
Cardiovascular:  
Rate and Rhythm: Normal   
rate and regular rhythm.  
Heart sounds: Normal heart   
sounds.  
Pulmonary:  
Effort: Pulmonary effort   
is normal. No respiratory   
distress.  
Breath sounds: Normal   
breath sounds. No wheezing   
or rales.  
Musculoskeletal:  
Cervical back: Neck   
supple.  
Lymphadenopathy:  
Cervical: No cervical   
adenopathy.  
Skin:  
General: Skin i (more   
content not included)... Normal                                  [facility]  
   
                                                    LIPID PANEL (CORONARY RISK 2  
)on 2022   
   
                                                    Cholesterol   
[Mass/Vol]      168 mg/dL       Normal          0 - 199         [facility]  
   
                                        Comment on above:   Result Comment: .  
AGE DESIRABLE BORDERLINE HIGH HIGH  
0-19 Y 0 - 169 170 - 199 >/= 200  
20-24 Y 0 - 189 190 - 224 >/= 225  
>24 Y 0 - 199 200 - 239 >/= 240  
**All ranges are based on fasting samples. Specific  
therapeutic targets will vary based on patient-specific  
cardiac risk.  
.  
Pediatric guidelines reference:Pediatrics 2011, 128(S5).  
Adult guidelines reference: NCEP ATPIII Guidelines,  
DOMINIC 2001, 258:2486-97  
.  
Venipuncture immediately after or during the  
administration of Metamizole may lead to falsely  
low results. Testing should be performed immediately  
prior to Metamizole dosing.   
   
                                                            Performed By: #### L  
IPID ####  
[facility]  
[id number]  
[place] [address]   
   
                                                    Cholesterol in HDL   
[Mass/Vol]      47.1 mg/dL      Normal                          [facility]  
   
                                        Comment on above:   Result Comment: .  
AGE VERY LOW LOW NORMAL HIGH  
0-19 Y < 35 < 40 40-45 ----  
20-24 Y ---- < 40 >45 ----  
>24 Y ---- < 40 40-60 >60  
.   
   
                                                            Performed By: #### L  
IPID ####  
[facility]  
[id number]  
[place] [address]   
   
                                                    Cholesterol in LDL   
[Mass/Vol]      62 mg/dL        Normal          0 - 99          [facility]  
   
                                        Comment on above:   Result Comment: .  
NEAR BORD  
AGE DESIRABLE OPTIMAL HIGH HIGH VERY HIGH  
0-19 Y 0 - 109 --- 110-129 >/= 130 ----  
20-24 Y 0 - 119 --- 120-159 >/= 160 ----  
>24 Y 0 - 99 100-129 130-159 160-189 >/=190  
.   
   
                                                            Performed By: #### L  
IPID ####  
[facility]  
[id number]  
[place] [address]   
   
                                                    Cholesterol in VLDL   
[Mass/Vol]      59 mg/dL        High            0 - 40          [facility]  
   
                                        Comment on above:   Performed By: #### L  
IPID ####  
[facility]  
[id number]  
[place] [address]   
   
                                                    Cholesterol.total/Ch  
olesterol in HDL   
[Mass ratio]    3.6 {ratio}     Normal                          [facility]  
   
                                        Comment on above:   Result Comment: REF   
VALUES  
DESIRABLE < 3.4  
HIGH RISK > 5.0   
   
                                                            Performed By: #### L  
IPID ####  
[facility]  
[id number]  
[place] [address]   
   
                      NON-HDL CHOLESTEROL 121 mg/dL  Normal                [facility]  
   
                                        Comment on above:   Result Comment: AGE   
DESIRABLE BORDERLINE HIGH HIGH VERY HIGH  
0-19 Y 0 - 119 120 - 144 >/= 145 >/= 160  
20-24 Y 0 - 149 150 - 189 >/= 190 ----  
>24 Y 30 MG/DL ABOVE LDL CHOLESTEROL GOAL  
.   
   
                                                            Performed By: #### L  
IPID ####  
[facility]  
[id number]  
[place] [address]   
   
                                                    Triglyceride   
[Mass/Vol]      297 mg/dL       High            0 - 149         [facility]  
   
                                        Comment on above:   Result Comment: .  
AGE DESIRABLE BORDERLINE HIGH HIGH VERY HIGH  
0 D-90 D 19 - 174 ---- ---- ----  
91 D- 9 Y 0 - 74 75 - 99 >/= 100 ----  
10-19 Y 0 - 89 90 - 129 >/= 130 ----  
20-24 Y 0 - 114 115 - 149 >/= 150 ----  
>24 Y 0 - 149 150 - 199 200- 499 >/= 500  
.  
Venipuncture immediately after or during the  
administration of Metamizole may lead to falsely  
low results. Testing should be performed immediately  
prior to Metamizole dosing.   
   
                                                            Performed By: #### L  
IPID ####  
[facility]  
[id number]  
[place] [address]   
   
                                                    Lipid Panelon 2022   
   
                                                    Cholesterol   
[Mass/Vol]      168 mg/dL                       0 - 199         [employer]  
on  
Work Phone:   
[phone]  
   
                                        Comment on above:   . AGE DESIRABLE BORD  
RIVERA HIGH HIGH 0-19 Y 0 - 169 170 - 199   
>/=   
200 20-24 Y 0 - 189 190 - 224 >/= 225 >24 Y 0 - 199 200 - 239 >/=   
240 **All ranges are based on fasting samples. Specific   
therapeutic targets will vary based on patient-specific cardiac   
risk.. Pediatric guidelines reference:Pediatrics 2011, 128(S5).   
Adult guidelines reference: NCEP ATPIII Guidelines, DOMINIC 2001,   
258:2486-97. Venipuncture immediately after or during the   
administration of Metamizole may lead to falsely low results.   
Testing should be performed immediately prior to Metamizole   
dosing.   
   
                                                    Cholesterol in HDL   
[Mass/Vol]      47.1 mg/dL                                      [facility]  
on  
Work Phone:   
[phone]  
   
                                        Comment on above:   . AGE VERY LOW LOW N  
ORMAL HIGH 0-19 Y < 35 < 40 40-45 ---- 20-  
24   
Y ---- < 40 >45 ---- >24 Y ---- < 40 40-60 >60.   
   
                                                    Cholesterol in LDL   
[Mass/Vol]      62 mg/dL                        0 - 99          [facility]   
[facility]  
on  
Work Phone:   
[phone]  
   
                                        Comment on above:   . NEAR BORD AGE ROMEL  
RABLE OPTIMAL HIGH HIGH VERY HIGH 0-19 Y 0  
 -   
109 --- 110-129 >/= 130 ---- 20-24 Y 0 - 119 --- 120-159 >/= 160   
---- >24 Y 0 - 99 100-129 130-159 160-189 >/=190.   
   
                                                    Cholesterol non HDL   
[Mass/Vol]      121 mg/dL                                       [facility]   
[facility]  
on  
Work Phone:   
[phone]  
   
                                        Comment on above:   AGE DESIRABLE BORDER  
LINE HIGH HIGH VERY HIGH 0-19 Y 0 - 119   
120 -   
144 >/= 145 >/= 160 20-24 Y 0 - 149 150 - 189 >/= 190 ---- >24 Y   
30 MG/DL ABOVE LDL CHOLESTEROL GOAL.   
   
                                                    Cholesterol.total/Ch  
olesterol in HDL   
[Mass ratio]    3.6 {ratio}                                     [facility]   
[facility]  
on  
Work Phone:   
[phone]  
   
                                        Comment on above:   REF VALUESDESIRABLE   
< 3.4HIGH RISK > 5.0   
   
                                                    Triglyceride   
[Mass/Vol]                297 mg/dL                 above high   
threshold                 0 - 149                   [facility]   
[facility]  
on  
Work Phone:   
[phone]  
   
                                        Comment on above:   . AGE DESIRABLE BORD  
RIVERA HIGH HIGH VERY HIGH 0 D-90 D 19 -   
174   
---- ---- ----91 D- 9 Y 0 - 74 75 - 99 >/= 100 ---- 10-19 Y 0 -   
89 90 - 129 >/= 130 ---- 20-24 Y 0 - 114 115 - 149 >/= 150 ----   
>24 Y 0 - 149 150 - 199 200- 499 >/= 500. Venipuncture   
immediately after or during the administration of Metamizole may   
lead to falsely low results. Testing should be performed   
immediately prior to Metamizole dosing.   
   
                                Lipid Panel     59 mg/dL        above high   
threshold                 0 - 40                    [facility]  
on  
Work Phone:   
[phone]  
   
                                                    Office Visit (Internal Medic  
ine)on 2022   
   
                                        Follow-up visit     Diagnoses/Problems  
Health Maintenance/Risks  
Encounter for preventive   
health examination (V70.0)   
(Z00.00)  
Assessed  
Pre-operative clearance   
(V72.84) (Z01.818)  
GERD (gastroesophageal   
reflux disease) (530.81)   
(K21.9)  
Hyperglycemia (790.29)   
(R73.9)  
Hyperlipidemia (272.4)   
(E78.5)  
Acquired hammertoes of   
both feet (735.4)   
(M20.41,M20.42)  
Orders  
Acquired hammertoes of   
both feet  
Start: Meloxicam 15 MG   
Oral Tablet; TAKE 1 TABLET   
DAILY  
Rx By: Savana Pedro;   
Dispense: 90 Days ; #:90   
Tablet; Refill: 3;For:   
Acquired hammertoes of   
both feet; LIZZY = N; Sent   
To: [id number];   
Last Updated By: System,   
[employer]; 3/7/2022   
2:59:37 PM  
Hyperlipidemia  
Lipid Panel; Status:In   
Progress - Specimen/Data   
Collected; Done: 2022  
Perform:Lab Services - Lab   
To Draw (Blood Test);   
Due:2022;Ordered;   
For:Hyperlipidemia;   
Ordered By:Savana Pedro;  
Provider Impressions  
In review of all issues no   
change in medication is   
necessary at this time.   
Recommend to continue with   
well rounded nutritional   
meals and daily physical   
activity. Recommend to   
maintain compliance with   
all prescribed   
medications.  
Medically stable for   
surgery  
  
Chief Complaint  
pre admit  
  
Adult Risk Screening  
  
Advance Care Planning   
discussed and documented   
in the medical record,   
patient did not wish or   
was not able to name a   
surrogate decision maker   
or provide an advance care   
plan.  
Living Will.  
Living Will: No living   
will on file.  
Healthcare POA: No   
healthcare proxy on file.  
Blood Pressure monitoring  
Blood Pressure Controlled,   
Systolic <130 mm Hg  
Blood Pressure Controlled,   
Diastolic < 80mm Hg  
Depression/Suicide   
Screening:  
During the past 2 weeks,   
the patient has not felt   
down, depressed or   
hopeless.  
During the past 2 weeks,   
the patient has not felt   
little interest or   
pleasure in doing things.  
  
History of Present Illness  
The patient is being seen   
for a preoperative visit.   
The procedure is a(n) Rt   
TKR scheduled for 3/15/22   
with Greg Pena DO.   
The indication for surgery   
is OA.  
Surgical Risk Assessment:   
Pertinent Past Medical   
History: no pertinent past   
medical history. Exercise   
Capacity: able to walk   
four blocks without   
symptoms and able to walk   
two flights of stairs   
without symptoms.   
Lifestyle Factors: tobacco   
use, heavy alcohol   
consumption and denies   
illegal drug use.   
Symptoms: no symptoms.  
Additional ELSY risk   
factors include age over   
50, but normal BMI, female   
gender and normal neck   
circumference. Pertinent   
Family History: no   
pertinent family history.  
Living Situation: home is   
secure and supportive and   
no post-op concerns with   
her living situation.  
Compliant with daily blood   
pressure medication.   
Denies any light head or   
dizzy with use of   
medication. Denies any   
obvious medications side   
effects  
GERD is well compensated   
with daily use of   
medication. Denies any   
nausea, vomiting, diarrhea   
or constipation  
Reports compliance with   
daily cholesterol lowering   
medication. Denies any   
myalgia associated with   
statin use. Denies any   
obvious side effects to   
medication.  
Patient reports all   
medications are up to date   
and no refills are   
necessary at this time.  
Most recent lab,   
radiologic and diagnostic   
studies reviewed.  
  
Review of Systems  
Constitutional: no fever,   
no chills, not feeling   
poorly, not feeling tired,   
no recent weight gain and   
no recent weight loss.  
ENT: no earache, no   
hearing loss, no   
nosebleeds, no nasal   
discharge, no sore throat   
and no hoarseness.  
Cardiovascular: the heart   
rate was not slow, the   
heart rate was not fast,   
no chest pain, no   
palpitations, no   
intermittent leg   
claudication and no lower   
extremity edema.  
Respiratory: no cough, not   
coughing up sputum and no   
wheezing that is   
consistent with asthma.  
Gastrointestinal: no   
abdominal pain, no   
constipation, no melena,   
no nausea, no diarrhea, no   
vomiting and no blood in   
stools.  
Musculoskeletal: no   
arthralgias, no myalgias,   
no back pain, no joint   
swelling, no joint   
stiffness, no limb pain   
and no limb swelling.  
Integumentary: no skin   
rashes, no skin lesions,   
no itching, no skin wound   
and no dry skin.  
Neurological: no headache,   
no confusion, no numbness,   
no dizziness, no tingling   
and no fainting.  
All other systems have   
been reviewed and are   
negative for complaint.  
  
Active Problems  
Problems  
Acquired hammertoes of   
both feet (735.4)   
(M20.41,M20.42)  
Carpal tunnel syndrome   
(354.0) (G56.00)  
GERD (gastroesophageal   
reflux disease) (530.81)   
(K21.9)  
Hyperglycemia (790.29)   
(R73.9)  
Hyperlipidemia (272.4)   
(E78.5)  
Insomnia, idiopathic   
(307.42) (F51.01)  
Macular degeneration   
(362.50) (H35.30)  
Neuropathy of left foot   
(355.8) (G57.92)  
OA (osteoarthritis) of   
knee (715.36) (M17.10)  
Stasis edema of left lower   
extremity (459.30)   
(I87.302)  
Visit for screening   
mammogram (V76.12)   
(Z12.31)  
Past Medical History  
Problems  
History of Hepatitis C,   
chronic (070.54) (B18.2)  
Resolved Date: 25 May 2016  
History of tinea cruris   
(V12.09) (Z86.19)  
Resolved Date: 15 Aug 2016  
History of Opioid   
dependence (304.00) (F11.   
(more content not   
included)...        Normal                                  [facility]  
   
                                                    PHQ-2 VITALSon 2022   
   
                                                    Adult depression   
screening assessment No                                              [facility]  
on  
Work Phone:   
[phone]  
00  
   
                                        Fall risk assessment a) No falls within   
the   
last year                                                   [facility]  
on  
Work Phone:   
[phone]  
00  
   
                                                    Tobacco use status   
[facility]            b) No                                           [facility]  
on  
Work Phone:   
[phone]  
00  
   
                      PHQ-2 VITALS Yes                              [facility]  
on  
Work Phone:   
[phone]  
[phone]  
   
                      PHQ-2 VITALS Adult                            [facility]  
on  
Work Phone:   
[phone]  
[phone]  
   
                                                    No Panel Informationon    
   
                                                                  -[place]   
[facility]   
[facility]  
Work Phone:   
[phone]  
   
                                                            [email or web address]  
[email or web address]  
[email or web address]  
[email or web address]                                                        [facility]   
Surgical   
Care  
Work Phone:   
[phone]  
   
                                                    Order Reconciliationon    
   
                                        Order Reconciliation Page 1  
  
Discharge Reconciliation   
Document  
  
  
  
Reconciliation Type:   
Discharge requested on   
behalf of Candida Whitley   
(Physician)  
done by Candida Whitley)  
  
Discharge -   
Reconciliation:   
2021 07:05 by:   
Candida Whitley)  
  
Home Medications   
EnteredHOME MEDICATIONS AT   
DISCHARGE   
DateReconciliation  
Comment/ Additional   
Information  
aMILoride 5 mg oral tablet   
1 tab(s) orally once a day   
2020 15:26  
aMILoride 5 mg oral tablet   
1 tab(s) orally once a day   
2020 15:26  
aMILoride 5 mg oral tablet   
is continued as aMILoride   
5 mg oral tablet  
CeleBREX 200 mg oral   
capsule 1 cap(s) orally   
once a day 2021   
12:22  
CeleBREX 200 mg oral   
capsule 1 cap(s) orally   
once a day 2021   
12:22  
CeleBREX 200 mg oral   
capsule is continued as   
CeleBREX 200 mg oral   
capsule  
chlorothiazide 250 mg oral   
tablet 2021 12:23   
chlorothiazide 250 mg  
oral tablet 2021   
12:23 chlorothiazide 250   
mg oral tablet is  
continued as   
chlorothiazide 250 mg oral   
tablet  
Klor-Con 8 oral tablet,   
extended release orally   
once a day 2020   
15:26  
Klor-Con 8 oral tablet,   
extended release orally   
once a day 2020  
15:26 Klor-Con 8 oral   
tablet, extended release   
is continued as Klor-Con 8   
oral  
tablet, extended release  
Multi Vitamin+ orally once   
a day 2020 15:26   
Multi Vitamin+ orally  
once a day 2020   
15:26 Multi Vitamin+ is   
continued as Multi   
Vitamin+  
Praluent Pen 2021   
12:21 Praluent Pen   
2021 12:21  
Praluent Pen is continued   
as Praluent Pen  
  
  
Home Medications Added   
During Discharge   
Reconciliation  
Activity as Tolerated   
2021, Routine,   
Assistance Level: None,  
Restrictions: None  
Additional Patient   
Instructions Do not   
consume alcoholic   
beverages for 24  
hours.  
Additional Patient   
Instructions Do not make   
important decisions or   
sign any  
important documents for   
the next 24 hours.  
Additional Patient   
Instructions Do not smoke   
for 24 hours.  
Call Physician For:   
excessive bleeding (slow   
general oozing that   
completely  
soaks dressing or fresh   
bright red bleeding) or   
bleeding that will not   
stop.  
Apply pressure to the area   
and elevate.  
Call Physician For:   
inability to urinate every   
8-12 hours and your   
bladder  
becomes too full or   
painful.  
Call Physician For:   
persistant nausea and/or   
vomiting Over 24 hours  
Call Physician For: signs   
and sypmtoms of infection   
Increased redness or  
swelling at incision site,   
increased pain/tenderness   
at surgical site,  
increased temperature   
greater than 100 degress,   
increasing and/or   
progressive  
drainage from surgical   
site, and/or unusual odor   
from surgical site.  
Diet Regular  
Discharge Discharge   
Diagnosis< Z12.11 Colon   
cancer screening Discharge  
Provider, Candida Whitley   
Discharge Disposition :   
.Home Condition at   
Discharge:  
Satisfactory  
Discharge Communication   
Instructions for Nursing   
Only: Remove IV prior to  
discharge from hospital.   
Do not remove any midline,   
if present, without an  
order from the provider.  
Discharge Instructions -   
PHR After your discharge   
from the hospital, two  
Summary of Care Documents   
will be available online   
in your  Personal Health  
Record (PHR).   
1.Consolidated-Clinical   
Document Architecture   
(C-CDA) Patient  
Discharge Summary This   
document is a summary of   
your hospital stay to be   
kept  
for your reference.2.C-CDA   
Visit Summary This   
document is a summary of   
your  
hospital stay to be shared   
with your follow-up   
providers (doctor,   
dietician,  
physical therapist, etc.).  
Post Procedure Discharge   
Criteria Criteria: Easily   
arousable / responding  
appropriately; Significant   
complications are absent;   
SpO2 = or > 92%, or if  
SpO2 < 92%, maintains   
within 2% of baseline;   
Vital signs +/- 20% of  
preprocedure status;   
Ambulates without   
dizziness / age   
appropriate activity and  
ambulatory status returns   
to pre-procedure baseline.  
  
  
All Active Home   
Medications at time of   
Discharge Reconciliation:   
2021  
07:05  
Activity as Tolerated   
2021, Routine,   
Assistance Level: None,  
Restrictions: None  
Additional Patient   
Instructions Do not   
consume alcoholic   
beverages for 24  
hours.  
Additional Patient   
Instructions Do not make   
important decisions or   
sign any  
important documents for   
the next 24 hours.  
Additional Patient   
Instructions Do not smoke   
for 24 hours.  
aMILoride 5 mg oral tablet   
1 tab(s) orally once a day  
Call Physician For:   
excessive bleeding (slow   
general oozing that   
completely  
soaks dressing or fresh   
bright red bleeding) or   
bleeding that will not   
stop.  
Apply pressure to the area   
and elevate.  
Call Physician For:   
inability to urinate every   
8-12 hours and your   
bladder  
becomes too full or   
painful.  
Call Physician For:   
persistant nausea and/or   
vomiting Over 24 hours  
Call Physician For: signs   
and sypmtoms of infection   
Increased redness or  
swelling at incision site,   
increased pain/tenderness   
at surgical site,  
increased temperature   
greater than 100 degress,   
increasing and/or   
progressive  
drainage from surgical   
site, and/or unusual odor   
from surg (more content   
not included)...    Normal                                  [facility] Surgical Pathology Dep[facility]  
tmenton 2021   
   
                                                    [facility] Surgical   
Pathology Department                    Name MARC DECKER.  
  
Accession #: [id number]  
Pathologist: GRACIELA TANNER MD  
Date of Procedure:   
2021  
Date Received: 2021  
Date Reported 11/10/2021  
Submitting Physician:   
CANDIDA WHITLEY MD  
Location: [facility]   
Endoscopy Other External #  
FINAL DIAGNOSIS  
A. CECAL POLYP,   
POLYPECTOMY:  
--TUBULAR ADENOMA.  
B. TRANSVERSE COLON POLYP,   
POLYPECTOMY:  
--LUMINAL CONTENTS.  
C. DESCENDING COLON POLYP,   
POLYPECTOMY:  
--TUBULAR ADENOMA.  
  
  
  
  
  
  
  
Electronically Signed Out   
By GRACIELA TANNER MD/SXM  
By the signature on this   
report, the individual or   
group listed as making the  
Final   
Interpretation/Diagnosis   
certifies that they have   
reviewed this case.  
  
Clinical History:  
Physician Contact Number:   
[phone]  
Fixative (A): Formalin  
Fixative (B): Formalin  
Fixative (C): Formalin  
Clinical Diagnosis History   
MARC DECKER is a 66   
year old Female seen at   
the  
request of Dr. Pedro for   
colon cancer screening.   
She has a family history   
of  
colon cancer. Her father   
was diagnosed and    
of colon cancer at age 62.  
Her grandfather also had   
colon cancer. She has no   
change in bowel habits. No  
blood in the stool. No   
abdominal pain or   
unintentional weight loss.   
She has  
daily bowel movements and   
is not on any stool   
softeners, fiber   
supplements or  
laxatives. Her last   
colonoscopy was over 5   
years ago and she had   
polyps at that  
time. She also has a   
sister with colon polyps.   
She is not on any blood  
thinners.  
Specimens Submitted As:  
A: CECAL POLYP  
B: TRANSVERSE COLON POLYP  
C: DESCENDING COLON POLYP  
Gross Description:  
A: Received in formalin,   
labeled with the patient's   
name and hospital number  
and  cecal polyp , is a   
fragment of tan, soft   
tissue measuring 0.6 x 0.3   
x 0.2  
cm. The specimen is   
submitted in toto in one   
cassette.  
DPG  
B: Received in formalin,   
labeled with the patient's   
name and hospital number  
and  transverse colon   
polyp , are multiple   
fragments of tan, organic   
material  
aggregating to 0.6 x 0.2 x   
0.1 cm. No soft tissue   
identified. The specimen   
is  
submitted in toto in one   
cassette.  
DPG  
C: Received in formalin,   
labeled with the patient's   
name and hospital number  
and  descending colon   
polyp , is a fragment of   
tan, soft tissue measuring   
0.5 x  
0.2 x 0.1 cm. The specimen   
is submitted in toto in   
one cassette.  
DPG  
dpg/2021  
[facility]  
Department of Pathology  
[address] [place] [address] Normal                                  [facility]  
   
                                        Comment on above:   Performed By: #### U  
[facility] ####  
[facility] Surgical Pathology Department  
[id number] [place], [place] [id number]   
   
                                                    CORONAVIRUS 2019, SCREEN ASY  
MPTOMATICon 2021   
   
                                                    SARS-CoV-2   
(COVID-19) RNA   
ADRYAN+probe Ql (Unsp   
spec)               Not detected        Normal              Not   
Detected                                [facility]  
   
                                        Comment on above:   Result Comment: .  
This assay is designed to detect the N, ORF1ab and/or S genes of   
SARS-CoV-2  
via nucleic acid amplification. A Negative (NOT DETECTED) result   
does not  
preclude 2019-nCoV infection since the adequacy of sample   
collection and/or  
low viral burden may result in presence of viral nucleic acids   
below the  
clinical sensitivity of this test method. Negative (NOT DETECTED)   
result  
should not be used as the sole basis for treatment or other   
patient  
management decisions. Rather negative results should be combined   
with  
clinical observations, patient history, and epidemiological   
information to  
make patient management decisions.  
Fact sheet for providers:   
https://www.fda.gov/media/621951/download  
Fact sheet for patients:   
https://www.fda.gov/media/122407/download  
This test has received FDA Emergency Use Authorization (EUA) and   
has been  
verified by [facility]   
([facility]). This test  
is only authorized for the duration of time that circumstances   
exist to  
justify the authorization of the emergency use of in vitro   
diagnostic tests  
for the detection of SARS-CoV-2 virus and/or diagnosis of   
COVID-19 infection  
under section 564(b)(1) of the Act, 21 U.S.C. 360bbb-3(b)(1),   
unless the  
authorization is terminated or revoked sooner.  
[facility] is certified under   
CLIA-88 as  
qualified to perform high complexity testing. Testing is   
performed in the  
[facility] laboratories located at [address] [place] [address].   
   
                                                            Performed By: #### C  
[facility] ####  
[facility]  
[address] [place] [address]   
   
                      Lab Specimen Source Nasal, Nasopharyngeal Normal            
      [facility]  
   
                                        Comment on above:   Performed By: #### C  
[facility] ####  
[place] [address]   
   
                                                    Coronavirus 2019 RNA by PCR,  
 Screening Asymptomticon 2021   
   
                                                    Coronavirus 2019 RNA   
by PCR, Screening   
Asymptomtic     Not detected    Normal          See Below       -[place]   
Surgical   
[facility]  
Work Phone:   
[phone]  
   
                                        Comment on above:   SOURCE: Nasal, Nasop  
haryngealReference Range: Not   
Detected.This   
assay is designed to detect the N, ORF1ab and/or S genes of   
SARS-CoV-2 via nucleic acid amplification. A Negative (NOT   
DETECTED) result does not preclude 2019-nCoV infection since the   
adequacy of sample collection and/or low viral burden may result   
in presence of viral nucleic acids below the clinical sensitivity   
of this test method. Negative (NOT DETECTED) result should not be   
used as the sole basis for treatment or other patient management   
decisions. Rather negative results should be combined with   
clinical observations, patient history, and epidemiological   
information to make patient management decisions.Fact sheet for   
providers: https://www.fda.gov/media/854280/downloadFact sheet   
for patients: https://www.fda.gov/media/833432/downloadThis test   
has received FDA Emergency Use Authorization (EUA) and has been   
verified by [facility]   
([facility]). This test is only authorized for the duration of time   
that circumstances exist to justify the authorization of the   
emergency use of in vitro diagnostic tests for the detection of   
SARS-CoV-2 virus and/or diagnosis of COVID-19 infection under   
section 564(b)(1) of the Act, 21 U.S.C. 360bbb-3(b)(1), unless   
the authorization is terminated or revoked sooner. [facility] is certified under CLIA-88 as   
qualified to perform high complexity testing. Testing is   
performed in the [facility] laboratories located at [address] [place] [address].   
   
                                                    Covid 19 Resultson   
1   
   
                                                    SARS-CoV-2   
(COVID-19) RNA   
ADRYAN+probe Ql (Unsp   
spec)                                   NEGATIVE COVID-19 Test  
  
Coronaviruses are common   
world-wide and are the   
cause of many common   
colds.  
SARS-COV2 is a new   
coronavirus that began   
circulating worldwide in   
2019 so we  
are calling it COVID-19.   
It has been estimated that   
four out of five patients  
with COVID-19 will recover   
at home without the need   
for medical attention.  
Symptoms of COVID-19 may   
include cough, fever,   
shortness of breath, loss   
of  
taste or smell and other   
flu-like symptoms   
including chills, sore   
muscles, sore  
throat, and headache.   
Severe illness is more   
common in older people and   
people  
with other health problems   
such as high blood   
pressure, obesity, and   
immune  
system problems.  
  
If the test is positive,   
you have COVID-19. You   
will be contacted by the  
ordering physicians office   
and instructed to remain   
on home isolation, in  
accordance with CDC   
guidelines. You may also   
be contacted by the [place]  
Department of Health to   
see if any of your close   
contacts may have been   
exposed  
to the virus and need to   
quarantine.  
  
If the test is negative,   
you likely do not have   
COVID-19 at this time, but   
you  
still may have a different   
illness that can spread to   
other people (like  
Influenza, or the Flu) and   
could still be at risk for   
getting COVID-19. We  
recommend that you stay   
away from other people to   
limit the spread of   
illness  
until your symptoms are   
improving and you are   
fever-free for 24 hours   
without  
the use of fever lowering   
medications such as   
acetaminophen or   
ibuprofen. No  
test is 100% accurate so   
if you are still concerned   
you may have COVID-19,   
talk  
to your doctor about the   
need to continue to stay   
away from others.  
  
Medicines  
  
Unless your provider told   
you not to use the   
following: Acetaminophen   
(Tylenol  
and others) is generally   
safe. Anti-inflammatory   
medications, such as   
Ibuprofen  
(Advil or Motrin) or   
Naproxen (Aleve) can also   
be used. Over-the-counter  
cough and cold medicines   
can be used according to   
the instructions on the  
package. Some   
over-the-counter medicines   
also contain   
acetaminophen. Make sure  
you are not taking more   
than your recommended   
dose. For those not   
hospitalized,  
there is no specific   
treatment available for   
this illness. Antibiotics   
do not  
treat Coronaviruses.  
  
Follow-Up  
  
Follow up with your doctor   
by scheduling a virtual   
visit or consider   
follow-up  
at one of our urgent care   
fever clinics. If you are   
having difficulty  
breathing, or are very   
weak and having difficulty   
standing, this is a   
medical  
emergency. Call 911 or   
have someone take you to   
the nearest emergency room  
immediately. If possible,   
wear a facemask.  
  
Additional guidance from   
the CDC for patients who   
tested POSITIVE for   
COVID-19  
  
How to isolate:  
Isolate yourself in a   
specific room at home and   
limit your contact with   
others.  
Use a separate bathroom   
from other members of the   
household, when possible.  
Leave home only to get   
essential medical care. Do   
not go to work, school or  
public areas.  
Avoid using public   
transportation,   
ride-sharing, or taxis.  
Restrict contact with pets   
and other animals. If you   
must care for your pet or  
be around animals while   
you are sick, wash your   
hands before and after   
your  
interaction and wear a   
facemask.  
Make sure that shared   
spaces in the home have   
good airflow, such as by   
an air  
conditioner or an opened   
window, weather   
permitting.  
  
Personal Hygiene   
Procedures:  
Wear a face mask when in   
the same room as other   
people or pets. If a face   
mask  
interferes with your   
breathing, others should   
wear a mask when sharing   
space  
with you.  
Frequent hand-washing:   
wash your hands with soap   
and water for at least 20  
seconds. If soap and water   
are not available, use   
alcohol-based hand   
.  
Avoid touching your eyes,   
nose, and mouth with   
unwashed hands.  
  
Household Hygiene   
Procedures:  
Avoid sharing personal   
household items such as   
dishes, glassware, cups,   
eating  
utensils, towels or   
bedding with other people   
or pets in your home.   
After use,  
these items should be   
washed with soap and hot   
water.  
Disinfect all high-touch   
surfaces every day with   
antibacterial cleaning  
solutions such as Lysol   
wipes, bleach, cleansers,   
etc. High-touch surfaces  
include tabletops,   
doorknobs, bathroom   
fixtures, toilets, phones,   
keyboards,  
tablets and bedside   
tables.  
Immediately clean any   
surfaces that may have   
blood, poop or body fluids   
on  
them, using antibacterial   
cleaning solutions such as   
Lysol wipes, bleach,  
cleansers, etc.  
If clothing or bedding   
come into contact with   
blood, poop or body   
fluids, they  
should be washed   
immediately. Follow the   
directions on the laundry   
detergent  
and clothing labels but   
hot water is recommended   
when possible.  
  
Stopping home isolation   
precautions:  
If possible, consult your   
doctor before stopping   
home isolation   
precautions.  
According to the CDC, you   
can discontinue home   
isolation precautions when   
you  
have met both of these   
criteria:  
Your fever and respiratory   
symptoms have been gone   
for 24 anderson (more content   
not included)...    Normal                                  [facility]  
   
                                                    [place] Mamm Screen w/CAD if perf  
ormed bilaton 2019   
   
                                                    [place] Mamm Screen w/CAD   
if performed bilat                      Exam Date/Time:  
2019 10:15 EST  
Reason for Exam:  
SCREENING  
Report  
STUDY:  
Digital mammography   
screening; 2019 10:15   
am  
ACCESSION NUMBER(S):  
[id number]  
ORDERING CLINICIAN:  
Dandre Pedro  
INDICATION:  
Screening.  
COMPARISON:  
Comparison is made to   
prior digital mammograms   
dated 2017  
FINDINGS:  
CC and MLO 2D digital   
mammographic images of the   
bilateral breasts were   
obtained.  
There are areas of   
scattered fibroglandular   
tissue. No discrete mass   
or focal asymmetry is  
identified. No suspicious   
microcalcifications or   
foci of architectural   
distortion are seen.  
There has been no   
significant change.  
This study was interpreted   
with CAD.  
IMPRESSION:  
No mammographic evidence   
of malignancy.  
BI-RADS CATEGORY:  
Category: 1 - Negative.  
Recommendation: Normal   
Interval Follow-up, Over   
Age 40.  
Recall Interval: 12   
Months.  
Breast Density: Scattered   
Fibroglandular Density.  
***** FINAL REPORT *****  
Dictated: 2019 8:18   
am Garfield Marshall MD  
Signed (Electronic   
Signature): 2019   
8:18 am  
Signed by: Garfield Marshall MD  
Technologist: [facility]  
Assessment: BI-RADS   
Category 1-Negative  
Recommendation: Normal   
interval follow-up  Normal                                  [facility]  
   
                                                    XR Abdomen 1 Viewon 20  
19   
   
                                        XR Abdomen 1 View   Exam Date/Time:  
2019 11:03 EST  
Reason for Exam:  
HX OF KIDNEY STONES  
Report  
STUDY:  
XR Abdomen 1 View;   
2019 11:03 am  
INDICATION:  
HX OF KIDNEY STONES.  
COMPARISON:  
None.  
ACCESSION NUMBER(S):  
[id number]  
ORDERING CLINICIAN:  
Elijah Bronson  
FINDINGS:  
Nonobstructive bowel gas   
pattern. Limited   
evaluation of   
pneumoperitoneum on supine   
imaging,  
however no gross evidence   
of free air is noted.   
Large bowel is full of   
stool. There are no  
radiopaque renal stones.  
Visualized lungs are   
clear.  
Osseous structures   
demonstrate no acute bony   
changes. There is   
degenerative disc disease   
of the  
lumbar spine.  
IMPRESSION:  
1. Large bowel full of   
stool. No radiopaque renal   
stones.  
***** FINAL REPORT *****  
Dictated: 2019 12:44   
pm Checo Garcia MD  
Signed (Electronic   
Signature): 2019   
12:44 pm  
Signed by: Checo Garcia MD  
Technologist: [facility]  
  
  
  
Vital Signs  
  
  
                      Date Time  Vital Sign Value      Performing Clinician Kaitlin jauregui  
   
                                                    2022   
20:                              Diastolic Blood   
Pressure Non-Invasive     74 1                      ROBERT GUZMAN MD  
Work Phone:   
[phone]                           [facility]  
   
                                                    2022   
20:          Heart rate          86 /min             ROBERT GUZMAN MD  
Work Phone:   
[phone]                           [facility]  
   
                                                    2022   
20:          Respiratory rate    18 /min             ROBERT GUZMAN MD  
Work Phone:   
[phone]                           [facility]  
   
                                                    2022   
20:                              Systolic Blood   
Pressure Non-Invasive     138 1                     ROBERT GUZMAN MD  
Work Phone:   
[phone]                           [facility]  
   
                                                    2022   
16:          Body height         180.3 cm            ROBERT GUZMAN MD  
Work Phone:   
[phone]                           [facility]  
   
                                                    2022   
16:          Body temperature    98.6 [degF]         ROBERT GUZMAN MD  
Work Phone:   
[phone]                           [facility]  
   
                                                    2022   
16:          Body weight         104.5 kg            ROBERT GUZMAN MD  
Work Phone:   
[phone]                           [facility]  
   
                                                    2022   
16:                              Diastolic Blood   
Pressure Non-Invasive     81 1                      ROBERT GUZMAN MD  
Work Phone:   
[phone]                           [facility]  
   
                                                    2022   
16:          Heart rate          99 /min             ROBERT GUZMAN MD  
Work Phone:   
[phone]                           [facility]  
   
                                                    2022   
16:          Respiratory rate    20 /min             ROBERT GUZMAN MD  
Work Phone:   
[phone]                           [facility]  
   
                                                    2022   
16:                              Systolic Blood   
Pressure Non-Invasive     160 1                     ROBERT GUZMAN MD  
Work Phone:   
[phone]                           [facility]  
   
                                                    2022   
14:          Body height         180.34 cm           Savana B [employer]  
Work Phone:   
[phone]                          [employer]   
[facility]  
Work Phone:   
[phone]  
   
                                                    2022   
14:                              Body mass index (BMI)   
[Ratio]                   33.12 kg/m2               Savana B [employer]  
Work Phone:   
[phone]                          [employer]   
[facility]  
Work Phone:   
[phone]  
   
                                                    2022   
14:                              Body surface area   
Derived from formula      2.27 m2                   Savana B Wiggers  
Work Phone:   
[phone]                          [employer]   
[facility]  
Work Phone:   
[phone]  
   
                                                    2022   
14:          Body temperature    96.2 [degF]         Savana B Wiggers  
Work Phone:   
[phone]                          [employer]   
[facility]  
Work Phone:   
[phone]  
   
                                                    2022   
14:          Body weight         107.7 kg            Savana B Wiggers  
Work Phone:   
[phone]                          [employer] [facility]  
Work Phone:   
[phone]  
   
                                                    2022   
14:                              Diastolic blood   
pressure                  72 mm[Hg]                 Savana Pedro  
Work Phone:   
[phone]                          [employer]   
[facility]  
Work Phone:   
[phone]  
   
                                                    2022   
14:          Heart rate          100 /min            Savana Perdo  
Work Phone:   
[phone]                          [employer]   
[facility]  
Work Phone:   
[phone]  
   
                                                    2022   
14:                              SaO2% (BldA) [Mass   
fraction]                 93 %                      Savana Pedro  
Work Phone:   
[phone]                          [employer]   
[facility]  
Work Phone:   
[phone]  
   
                                                    2022   
14:                              Systolic blood   
pressure                  120 mm[Hg]                Savana Pedro  
Work Phone:   
[phone]                          [employer]   
[facility]  
Work Phone:   
[phone]  
  
  
  
Encounters  
  
  
                          Encounter Date Encounter Type Care Provider Facility  
   
                          Start: 2024 ambulatory   Akshat Hornacon Or  
[facility]  
   
                                Start: 2024 ambulatory      [address]   
PHYSICIAN                               Facility:[id number]  
   
                                                    Start: 2024  
End: 2024           ambulatory                [address]   
PHYSICIAN                               Facility:[facility]  
   
                          Start: 2024 ambulatory   JANETTE Morrison  
cility:[facility]  
   
                                                    Start: 2024  
End: 2024     ambulatory          FRANCY MOSLEY    Not Available  
   
                          Start: 2024 ambulatory   Akshat Lynn Ort  
[facility]   
[facility]  
   
                                                    Start: 2023  
End: 2023     ambulatory          WILLIAN COATES      Facility:[facility]  
   
                          Start: 10- ambulatory   WILLIAN COATES Facility  
:[facility]  
   
                          Start: 2023 ambulatory   WILLIAN COATES Facility  
:[facility]  
   
                                Start: 2023 Rx Renewal      Referring Prov  
ider   
Unknown                                 [employer]   
[facility]  
Work Phone:   
[phone]  
   
                                Start: 2023 Rx Renewal      Savana FLAKO Pedro  
Work Phone:   
[phone]                          [employer]   
[facility]  
Work Phone:   
[phone]  
   
                                                    Start: 2022  
End: 2022     ambulatory          DONY RAHMAN DO    Facility:[facility]  
   
                                                    Start: 2022  
End: 2022                         Patient encounter   
procedure                               DONY LACEY DO  
Work Phone:   
[phone]                           [facility]  
Work Phone:   
[phone]  
   
                                                    Start: 2022  
End: 2022                         Emergency department   
patient visit             RAEANN SHORT           Facility:[facility]  
   
                                                    Start: 2022  
End: 2022                         Emergency department   
patient visit                           ROBERT GUZMAN MD  
Work Phone:   
[phone]                           [facility]  
Work Phone:   
[phone]  
   
                                                    Start: 2022  
End: 2022     ambulatory          SAVANA PEDRO      Facility:[facility]  
   
                                Start: 12- Telephone encounter Laxmi Krause APRN.CNP  
Work Phone:   
[phone]                          [facility]  
   
                                        Comment on above:   Results   
   
                                                    Start: 2022  
End: 2022     ambulatory          SAVANA B WIGGERS      Facility:[facility]  
   
                                Start: 2022 Rx Renewal      Savana B Wiggers  
Work Phone:   
[phone]                          [facility]   
[facility]  
Work Phone:   
[phone]  
   
                                Start: 2022 Rx Renewal      Savana B Wiggers  
Work Phone:   
[phone]                          [facility]   
[facility]  
Work Phone:   
[phone]  
   
                                Start: 2022 Chart Update    Savana B Wiggers  
Work Phone:   
[phone]                          [facility]   
[facility]  
Work Phone:   
[phone]  
   
                                        Start: 2022   Office outpatient vi  
sit   
25 minutes                              Savana B Wiggers  
Work Phone:   
[phone]                          [facility]   
[facility]  
Work Phone:   
[phone]  
   
                                Start: 2021 AUDIT           Savana B Wiggers  
Work Phone:   
[phone]                          [facility]   
[facility]  
Work Phone:   
[phone]  
   
                                Start: 2021 AUDIT           Savana B Wiggers  
Work Phone:   
[phone]                          [facility]   
[facility]  
Work Phone:   
[phone]  
   
                                Start: 2021 Telephone encounter Savana FLAKO spear  
Work Phone:   
[phone]                          [facility]  
Work Phone:   
[phone]  
   
                                Start: 10- Rx Renewal      Savana B Wiggers  
Work Phone:   
[phone]                          [facility]   
[facility]  
Work Phone:   
[phone]  
   
                                Start: 10- AUDIT           Savana B Wiggers  
Work Phone:   
[phone]                          [facility]   
[facility]  
Work Phone:   
[phone]  
   
                                Start: 10- Rx Renewal      Savana B Wiggers  
Work Phone:   
[phone]                          [facility]   
[facility]  
Work Phone:   
[phone]  
   
                                Start: 2021 Rx Renewal      Savana B Wiggers  
Work Phone:   
[phone]                          [facility]   
[facility]  
Work Phone:   
[phone]  
   
                                Start: 2021 Rx Renewal      Savana B Wiggers  
Work Phone:   
[phone]                          [facility]   
[facility]  
Work Phone:   
[phone]  
   
                                Start: 2021 Rx Renewal      Savana B Wiggers  
Work Phone:   
[phone]                          [facility]   
[facility]  
Work Phone:   
[phone]  
   
                                Start: 2021 Rx Renewal      Savana B Wiggers  
Work Phone:   
[phone]                          [facility]   
[facility]  
Work Phone:   
[phone]  
   
                                        Start: 2020   Patient encounter   
procedure                 Savana Wiggers              [facility]   
[facility]  
Work Phone:   
[phone]  
   
                                        Start: 2019   Patient encounter   
procedure                 Savana Wiggers              [facility]   
[facility]  
Work Phone:   
[phone]  
   
                                        Start: 2019   Patient encounter   
procedure                 Savana Pedro              [facility]   
[facility]  
Work Phone:   
[phone]  
   
                                        Start: 2019   Patient encounter   
procedure                 Savana Pedro              [facility]   
[facility]  
Work Phone:   
[phone]  
   
                                                    Start: 2019  
End: 2019                         Patient encounter   
procedure                 Elijah Bronson               Facility:[facility]  
   
                                                    Start: 2019  
End: 2019                         Patient encounter   
procedure                 Dandre Pedro           Facility:[facility]  
   
                                        Start: 2018   Patient encounter   
procedure                 Savana Pedro              [facility]   
[facility]  
Work Phone:   
[phone]  
   
                                                Preoperative state Savana Cordova  
s  
Work Phone:   
[phone]                          [employer]  
Work Phone:   
[phone]  
  
  
  
Procedures  
  
  
                          Date         Procedure    Procedure Detail Performing   
Clinician  
   
                          Start: 2021 Colonoscopy               Savana Mainjeff lainezs  
Work Phone: [phone]  
   
                          Start: 2021 Colonoscopy               Savana ARRIOLA Christy  
gers  
Work Phone: [phone]  
   
                                        Comment on above:   Repeat 3 years;   
   
                          Start: 2014 Mammography               Laxmi Krause  
 APRRODY.CNP  
Work Phone: [phone]  
  
  
  
Plan of Treatment  
  
  
                          Date         Care Activity Detail       Author  
   
                                                    Start:   
2022                              SHIRA, Provider:   
Savana Pedro, Status:   
Pen, Time: 1:30 PM                      SHIRA Provider:   
Savana Pedro, Status:   
Pen, Time: 1:30 PM                      [facility]   
[place]  
Work Phone:   
[phone]  
   
                                                    Start:   
2022          Influenza vaccination INFLUENZA (#1)      [facility]  
   
                                                    Start:   
2022                              ADVANCE DIRECTIVE   
DISCUSSION                              ADVANCE DIRECTIVE   
DISCUSSION                              [facility]  
   
                                                    Start:   
2022          BONE DENSITY        BONE DENSITY        [facility]  
   
                                                    Start:   
2022                              PNEUMOCOCCAL: 65+ (1 -   
PCV)                                    PNEUMOCOCCAL: 65+ (1 -   
PCV)                                    [facility]  
   
                                                    Start:   
2022          DEPRESSION ASSESSMENT DEPRESSION ASSESSMENT [facility]  
   
                                                    Start:   
2021                              COLON, Provider:   
Candida Whitley, Status:   
Pen, Time: 7:30 AM                      COLON, Provider:   
Candida Whitley, Status:   
Pen, Time: 7:30 AM                      [facility]   
[facility]  
Work Phone:   
[phone]  
   
                                                    Start:   
2021                              COVID-19 VACCINE (2 -   
Booster for Rosaura   
series)                                 COVID-19 VACCINE (2 -   
Booster for Rosaura   
series)                                 [facility]  
   
                                                    Start:   
2015          Mammography         MAMMOGRAM           [facility]  
   
                                                    Start:   
2007                SHINGRIX VACCINE (1 of 2) SHINGRIX VACCINE (1 of   
2)                                      [facility]  
   
                                                    Start:   
2002          COLOGUARD (FIT-DNA) COLOGUARD (FIT-DNA) [facility]  
   
                                                    Start:   
2002          Colonoscopy         COLONOSCOPY         [facility]  
   
                                                    Start:   
2002                              COLORECTAL CANCER   
SCREENING                               COLORECTAL CANCER   
SCREENING                               [facility]  
   
                                                    Start:   
2002          CT COLONOGRAPHY     CT COLONOGRAPHY     [facility]  
   
                                                    Start:   
2002          DIABETES SCREEN     DIABETES SCREEN     [facility]  
   
                                                    Start:   
2002          FECAL OCCULT BLOOD  FECAL OCCULT BLOOD  [facility]  
   
                                                    Start:   
2002          LIPID SCREEN        LIPID SCREEN        [facility]  
   
                                                    Start:   
2002          SIGMOIDOSCOPY       SIGMOIDOSCOPY       [facility]  
   
                                                    Start:   
1976                              Urine microalbumin   
profile                   DTAP,TDAP,TD (1 - Tdap)   [facility]  
   
                                                    Start:   
1975          HEPATITIS C SCREENING HEPATITIS C SCREENING [facility]  
   
                                                    Start:   
1975          HIV SCREENING       HIV SCREENING       [facility]  
  
  
  
Immunizations  
  
  
                      Immunization Date Immunization Notes      Care Provider Fa  
nixon  
   
                                        2021          Rosaura COVID-19 Vac  
cine   
0.5 ML Intramuscular   
Suspension                                          Savana Pedro  
Work Phone:   
[phone]                          [employer]   
[facility]  
Work Phone:   
[phone]  
   
                                        10-          influenza virus vacc  
ine,   
unspecified formulation                             Savana Pedro  
Work Phone:   
[phone]                          [employer]   
[facility]  
Work Phone:   
[phone]  
   
                                        Comment on above:   Series:   
   
                                        10-          zoster vaccine   
recombinant                                         Savana Pedro  
Work Phone:   
[phone]                          [employer]   
[facility]  
Work Phone:   
[phone]  
   
                                        Comment on above:   Series:   
   
                                        10-          Influenza, injectabl  
e,   
Madin [place] Canine   
Kidney, preservative   
free, quadrivalent                                  Savana FLAKO [employer]  
Work Phone:   
[phone]                          [employer] [facility]  
Work Phone:   
[phone]  
   
                                        01-          influenza, injectabl  
e,   
quadrivalent,   
preservative free                                   Asvana B [employer]  
Work Phone:   
[phone]                          [facility] [facility]  
Work Phone:   
[phone]  
   
                                        2014          influenza virus vacc  
ine,   
unspecified formulation;   
Translations:   
[Influenza]                                         Savana [employer]  
Work Phone:   
[phone]                          [facility]  
Work Phone:   
[phone]  
   
                                        Comment on above:   Series:   
   
                                        2014          influenza, seasonal,  
   
injectable;   
Translations:   
[Influenza]                             Savana [employer]        [employer] [facility]  
Work Phone:   
[phone]  
   
                                        10-          influenza virus vacc  
ine,   
whole virus                                         Savana [employer]  
Work Phone:   
[phone]                          [employer] [facility]  
Work Phone:   
[phone]  
  
  
  
Payers  
  
  
                          Date         Payer Category Payer        Policy ID  
   
                          2010   Unknown                   [id number]  
   
                          2009   Unknown                     
   
                          1955   Unknown                   [id number] 2.16.8  
[id number]  
   
                          1957   Unknown                   [id number] 2.16.8  
[id number]  
   
                          1957   Unknown                   [id number] 2.16.84  
[id number]  
   
                          1957   Unknown                   [id number] 2.16.8  
[id number]  
   
                          1957   Unknown                   [id number] 2.16.8  
[id number]  
   
                          1957   Unknown                   [id number] 2.16.8  
[id number]  
   
                          1957   Unknown                   [id number] 2.16.8  
[id number]  
   
                          1957   Unknown                   [id number] 2.16.8  
[id number]  
   
                          1957   Unknown                   [id number] 2.16.8  
[id number]  
   
                          1957   Unknown                   [id number] 2.16.8  
[id number]  
   
                          1955   Unknown                   [id number] 2.16.8  
[id number]  
  
  
  
Social History  
  
  
                          Date         Type         Detail       Facility  
   
                                       Never smoker Never smoker -[facility]  
al   
[facility]  
Work Phone:   
[phone]  
   
                                        Start: 2022   Tobacco smoking   
status NHIS               Never smoked tobacco      [facility]  
Work Phone:   
[phone]  
   
                                        Start: 2022   Tobacco use and   
exposure                                Smokeless tobacco   
non-user                                [facility]  
Work Phone:   
[phone]  
   
                                        Start: 1955   Sex Assigned At   
Birth                     Not on file               [facility]  
   
                                                    NEGATED: Highlighted   
row                 -                   -                   [facility]  
Work Phone:   
[phone]  
  
  
  
Functional Status  
  
  
                          Date         Assessment   Result       Facility  
   
                                                    NEGATED: Highlighted   
row                       Functional performance    Functional status   
health issues are not   
documented Disease                      [facility]  
Work Phone:   
[phone]  
  
  
  
Mental Status  
  
  
                          Date         Assessment   Result       Facility  
   
                                                    NEGATED: Highlighted   
row                                     Cognitive function   
[Interpretation]                        Cognitive status   
health issues are not   
documented Disease                      [facility]  
Work Phone:   
[phone]  
  
  
  
Clinical Notes 2021 to 2022  
 Telephone Encounter - Lidia Bentley RN - 12/15/2022 8:54 AM ESTTelephone   
Encounter - Petty Montilla LPN - 12/15/2022 8:51 AM ESTTelephone Encounter - 
Tara Dockery LPN - 12/15/2022 8:49 AM EST  
  
                                Note Date & Type Note            Facility  
   
                                                    2022 Hospital   
Discharge instructions                    
  
  
Patient Education  
  
  
2022 20:39:06  
  
  
URI, Viral, No Abx (Adult)  
  
  
Viral Upper Respiratory Illness   
(Adult)  
  
  
You have a viral upper respiratory   
illness (URI), which is another term   
for the common cold. This illness is   
contagious during the first few   
days. It is spread through the air   
by coughing and sneezing. It may   
also be spread by direct contact   
(touching the sick person and then   
touching your own eyes, nose, or   
mouth). Frequent handwashing will   
decrease risk of spread. Most viral   
illnesses go away within 7 to 10   
days with rest and simple home   
remedies. Sometimes the illness may   
last for several weeks. Antibiotics   
will not kill a virus, and they are   
generally not prescribed for this   
condition.  
Home care  
If symptoms are severe, rest at home   
for the first 2 to 3 days. When you   
resume activity, don't let yourself   
get too tired.  
Don't smoke. If you need help   
stopping, talk with your healthcare   
provider.  
Avoid being exposed to cigarette   
smoke (yours or others ).  
You may use acetaminophen or   
ibuprofen to control pain and fever,   
unless another medicine was   
prescribed. If you have chronic   
liver or kidney disease, have ever   
had a stomach ulcer or   
gastrointestinal bleeding, or are   
taking blood-thinning medicines,   
talk with your healthcare provider   
before using these medicines.   
Aspirin should never be given to   
anyone under 18 years of age who is   
ill with a viral infection or fever.   
It may cause severe liver or brain   
damage.  
Your appetite may be poor, so a   
light diet is fine. Stay well   
hydrated by drinking 6 to 8 glasses   
of fluids per day (water, soft   
drinks, juices, tea, or soup). Extra   
fluids will help loosen secretions   
in the nose and lungs.  
Over-the-counter cold medicines will   
not shorten the length of time you   
re sick, but they may be helpful for   
the following symptoms: cough, sore   
throat, and nasal and sinus   
congestion. If you take prescription   
medicines, ask your healthcare   
provider or pharmacist which   
over-the-counter medicines are safe   
to use. (Note: Don't use   
decongestants if you have high blood   
pressure.)  
Follow-up care  
Follow up with your healthcare   
provider, or as advised.  
When to seek medical advice  
Call your healthcare provider right   
away if any of these occur:  
Cough with lots of colored sputum   
(mucus)  
Severe headache; face, neck, or ear   
pain  
Difficulty swallowing due to throat   
pain  
Fever of 100.4 F (38 C) or higher,   
or as directed by your healthcare   
provider  
Call 911  
Call 911 if any of these occur:  
Chest pain, shortness of breath,   
wheezing, or difficulty breathing  
Coughing up blood  
Very severe pain with swallowing,   
especially if it goes along with a   
muffled voice  
  
[id number] [employer].   
[address] [place] [address]   
[address]. All rights reserved. This   
information is not intended as a   
substitute for professional medical   
care. Always follow your healthcare   
professional's instructions.  
  
  
  
Follow Up Care  
  
  
2022 16:06:47  
  
  
With:Call Physician Referral   
[phone]  
Address:Unknown  
When:2-4 days                           [facility]  
Work Phone:   
[phone]  
   
                                                    2022 Emergency   
department Discharge   
summary                                   
  
  
  
  
Discharge Instructions  
Thank you for allowing [facility] to   
assist you with your healthcare   
needs. The following is important   
discharge information regarding your   
hospital visit. Diagnosis from   
Today's Visit  
Upper respiratory infection  
SOB - Shortness of breath  
  
  
What to Do Next  
  
Instructions from Your Care Team  
No qualifying data available.  
Post Acute Orders  
  
No qualifying data available.  
  
  
You Need to Schedule the Following   
Appointments  
  
Follow Up with Call Physician   
Referral [phone]  
When Within 2-4 days  
  
  
  
  
  
  
Allergies  
  
penicillin  
  
  
  
Medications  
Please ask your primary doctor or   
pharmacist before taking any other   
medication not listed, including   
over the counter drugs, herbal   
medications, vitamins and or   
supplements as they may interact   
with your home medications.  
  
Please take this list to your next   
doctor s visit. Bring all   
medications you take, including over   
the counter medications, herbals and   
other supplements with you to your   
doctor s visit. Patients and   
families are reminded to discard old   
lists and to update any records with   
all medication providers or retail   
pharmacies.  
  
  
  
Education Materials  
  
  
  
Viral Upper Respiratory Illness   
(Adult)  
You have a viral upper respiratory   
illness (URI), which is another term   
for the common cold. This illness is   
contagious during the first few   
days. It is spread through the air   
by coughing and sneezing. It may   
also be spread by direct contact   
(touching the sick person and then   
touching your own eyes, nose, or   
mouth). Frequent handwashing will   
decrease risk of spread. Most viral   
illnesses go away within 7 to 10   
days with rest and simple home   
remedies. Sometimes the illness may   
last for several weeks. Antibiotics   
will not kill a virus, and they are   
generally not prescribed for this   
condition.  
Home care  
If symptoms are severe, rest at home   
for the first 2 to 3 days. When you   
resume activity, don't let yourself   
get too tired.  
Don't smoke. If you need help   
stopping, talk with your healthcare   
provider.  
Avoid being exposed to cigarette   
smoke (yours or others ).  
You may use acetaminophen or   
ibuprofen to control pain and fever,   
unless another medicine was   
prescribed. If you have chronic   
liver or kidney disease, have ever   
had a stomach ulcer or   
gastrointestinal bleeding, or are   
taking blood-thinning medicines,   
talk with your healthcare provider   
before using these medicines.   
Aspirin should never be given to   
anyone under 18 years of age who is   
ill with a viral infection or fever.   
It may cause severe liver or brain   
damage.  
Your appetite may be poor, so a   
light diet is fine. Stay well   
hydrated by drinking 6 to 8 glasses   
of fluids per day (water, soft   
drinks, juices, tea, or soup). Extra   
fluids will help loosen secretions   
in the nose and lungs.  
Over-the-counter cold medicines will   
not shorten the length of time you   
re sick, but they may be helpful for   
the following symptoms: cough, sore   
throat, and nasal and sinus   
congestion. If you take prescription   
medicines, ask your healthcare   
provider or pharmacist which   
over-the-counter medicines are safe   
to use. (Note: Don't use   
decongestants if you have high blood   
pressure.)  
Follow-up care  
Follow up with your healthcare   
provider, or as advised.  
When to seek medical advice  
Call your healthcare provider right   
away if any of these occur:  
Cough with lots of colored sputum   
(mucus)  
Severe headache; face, neck, or ear   
pain  
Difficulty swallowing due to throat   
pain  
Fever of 100.4 F (38 C) or higher,   
or as directed by your healthcare   
provider  
Call 911  
Call 911 if any of these occur:  
Chest pain, shortness of breath,   
wheezing, or difficulty breathing  
Coughing up blood  
Very severe pain with swallowing,   
especially if it goes along with a   
muffled voice  
[id number] [employer].   
[address] [place] [address]   
[address]. All rights reserved. This   
information is not intended as a   
substitute for professional medical   
care. Always follow your healthcare   
professional's instructions.  
  
  
  
  
  
Additional Information  
VACCINATE! IT SAVES LIVES!  
Members of the community who have   
not yet received the COVID-19   
vaccine and would like to receive it   
can visit one of [facility] vaccine   
clinics. There are many vaccine   
clinic locations within the [facility].   
For locations and available times,   
please visit   
[email or web address].   
It is important to note that some   
COVID mobile vaccine clinics are   
held outdoors and may be canceled in   
rainy or stormy conditions.  
To learn more about pediatric   
vaccinations (ages 5-11), we invite   
you to visit the [facility]   
webpage.   
[email or web address]  
[email or web address]-A  
sked-Questions.html To learn more   
about the COVID-19 vaccine, we   
invite you to visit the [facility]   
website for a list of frequently   
asked questions.   
[email or web address]  
[email or web address]  
y_[email or web address]  
[facility] Patient Portal   
Access Instructions:  
Stay connected with your healthcare   
team and access your personal   
medical information anytime with the   
[facility] Patient Portal. If   
you would like a full copy of your   
medical records please contact the   
[facility] Medical Records   
Department Monday through Friday   
between 8a.m. and 4:30p.m. Please   
follow the directions below to   
access the portal:  
1.Access the email account you   
provided upon registration to the   
hospital.2.Look for an invitation   
email from [facility].3.Open   
the email and access the invitation   
link: Accept Invitation to [facility].Fill in the required   
fields to create your account.  
Sign into [email or web address] with your   
username and password that you   
created in the above steps to stay   
up to date. You can then view a   
summary of results, a summary of   
your visits, and the ability to   
download your summaries to your   
computer or send the information   
securely to a physician. Remember   
that your healthcare information is   
confidential, so carefully consider   
who you will allow to register on   
the [facility] Patient Portal   
for access to your information. You   
can also access the [facility]   
Patient Portal on the [facility]   
julio. Simply click on  Health   
Records  under  Health Data  and   
then click on the [employer] logo.  
  
  
HOW TO SAFELY DISPOSE OF   
PRESCRIPTION MEDICATIONS  
Please use one of the following   
methods to safely dispose of your   
unused medications.  
1.Use a drug disposal kit: the drug   
disposal pouch allows you to safely   
discard your old and unused drugs.   
Ask your nurse to give you one when   
you are discharged.2.Visit a local   
take-back location: Many local   
pharmacies and police departments   
have programs that collect old and   
unwanted prescription drugs. Call   
your local pharmacy or go to   
http://[employer].Semblee_/8X6Dm3i to find one   
close to you.3.Make use of household   
items: Use cat litter or old coffee   
grounds to dispose medications if   
other options are not available. Mix   
your drugs with these household   
products, seal them in an airtight   
container and throw it into the   
garbage. Call [facility]: [phone]   
to be sure your drugs can be   
disposed of in this way. Some   
medicines may require a different   
approach.4.Never flush your   
medications down the toilet.  
IF YOU HAVE BEEN PRESCRIBED AN   
OPIOIDS FOR PAIN  
If you have been prescribed an   
opioid (such as hydrocodone,   
oxycodone or morphine), it is   
critical to understand the possible   
side effects and risks of opioid   
pain medications. Even when taken as   
directed, opioids can have several   
side effects including:  
Tolerance, meaning you might need to   
take more of a medication for the   
same pain relief. Nausea, vomiting   
and/or constipation. Sleepiness,   
dizziness, dry mouth, confusion,   
depression or itching. Physical   
dependence, meaning you have   
withdrawal symptoms when a   
medication is stopped ? this can   
develop within a few days.  
KNOW YOUR RESPONSIBILITIES  
It is important to know exactly how   
much and how often to take the   
opioid pain medications you are   
prescribed. Never take opioids in   
higher amounts or more often than   
prescribed. Do not combine opioids   
with alcohol or other drugs that   
cause drowsiness, such as   
benzodiazepines, also known as   
benzos, including diazepam and   
alprazolam, muscle relaxants or   
sleep aids. Never sell or share   
prescription opioids. This is   
illegal. Store opioids in a secure   
place and out of reach of others   
(including children, family, friends   
and visitors).  
  
The last page(s) of this document   
has been signed and retained as a   
CHART COPY  
  
  
Signatures  
  
Patient Education Materials  
  
URI, Viral, No Abx (Adult)  
  
  
Medication Leaflets  
My discharge plan and instructions   
have been reviewed and explained to   
me and IARYA DENISE L understand   
my current condition and have read   
and understand these discharge   
instructions. I have received a   
written copy of the   
plan/instructions. If I have   
questions, I am aware that I should   
contact my doctor.  
Patient/Representative   
Signature:__________________________  
___ Date/Time: _____________  
Relationship to   
Patient:____________________________  
_  
Witness   
Name/Signature:_____________________  
________ Date/Time: _____________  
  
                                        [facility]  
   
                                        2022 Note       
  
  
  
  
ORIGINAL  
  
EXAMINATION:  
ONE XRAY VIEW OF THE CHEST  
  
2022 6:29 pm  
  
COMPARISON:  
None.  
  
HISTORY:  
ORDERING SYSTEM PROVIDED HISTORY:  
Reason for Exam: chest pain  
  
FINDINGS:  
Normal cardiomediastinal contours.   
There is elevation of the right  
hemidiaphragm with right basilar   
atelectasis versus airspace disease.   
No  
vascular congestion, pleural   
effusion, or pneumothorax. There are  
degenerative changes within the   
shoulders and spine.  
  
IMPRESSION:  
Elevation of the right hemidiaphragm   
with right basilar atelectasis   
versus  
airspace disease.  
  
I have personally reviewed the   
images of this examination and agree   
with the  
resident's findings and   
interpretation.  
  
  
Interpreted by: Osvaldo Alejandra  
Preliminary Report By: Madison Choudhary  
Electronically signed By Osvaldo Alejandra  
Dictated Date: 2022 6:54:19 PM  
Prelim Date: 2022 6:55:25 PM  
Sign Date: 2022 7:10:40 PM  
Ordering Provider: CALLUM JOHNS         [facility]  
   
                                        2022 Note       
  
ORIGINAL  
  
EXAMINATION:  
ONE XRAY VIEW OF THE CHEST  
  
2022 6:29 pm  
  
COMPARISON:  
None.  
  
HISTORY:  
ORDERING SYSTEM PROVIDED HISTORY:  
Reason for Exam: chest pain  
  
FINDINGS:  
Normal cardiomediastinal contours.   
There is elevation of the right  
hemidiaphragm with right basilar   
atelectasis versus airspace disease.   
No  
vascular congestion, pleural   
effusion, or pneumothorax. There are  
degenerative changes within the   
shoulders and spine.  
  
IMPRESSION:  
Elevation of the right hemidiaphragm   
with right basilar atelectasis   
versus  
airspace disease.  
  
I have personally reviewed the   
images of this examination and agree   
with the  
resident's findings and   
interpretation.  
  
  
Interpreted by: Osvaldo Alejandra  
Preliminary Report By: Madison Choudhary  
Electronically signed By Osvaldo Alejandra  
Dictated Date: 2022 6:54:19 PM  
Prelim Date: 2022 6:55:25 PM  
Sign Date: 2022 7:10:40 PM  
Ordering Provider: [facility]  
   
                                                    2022 SARS-CoV-2   
(COVID-19) RNA ADRYAN+probe Ql   
(Nph)                                   Negative  
(22 5:55 PM)                      [facility]  
   
                                        2022 Note     HNO ID: [id number]  
Author: DORA Hernandez.CNP  
Service: ?  
Author Type: Nurse Practitioner  
Type: Progress Notes  
Filed: 2022 5:32 PM  
Note Text:  
Subjective  
HPI Marc Decker is a 67 year old   
female who presents with cough,  
shortness of breath, chest pain,   
feeling dizzy and like she is going   
to  
pass out. This began on Thursday and   
is getting worse. She feels  
lightheaded during exam.  
Review of Systems  
Constitutional: Negative for chills   
and fever.  
Respiratory: Positive for cough and   
shortness of breath.  
Cardiovascular: Positive for chest   
pain.  
Gastrointestinal: Negative for   
nausea and vomiting.  
Neurological: Positive for dizziness   
and weakness.  
/78   Pulse 100   Temp 36.4 ?C   
(97.5 ?F)   Resp 18   Wt 108.8  
kg (239 lb 12.8 oz)   SpO2 97%  
No past medical history on file.  
No past surgical history on file.  
ALLERGIES Ciprofloxacin and   
Penicillins  
MEDICATIONS  
ursodiol (ACTIGALL) 300 mg capsule   
TAKE 1 CAPSULE BY MOUTH TWICE A DAY  
FOR FATTY LIVER  
EVOLOCUMAB (REPATHA SYRINGE   
SUBCUTANEOUS) Inject subcutaneously.   
Monthly  
injection  
Potassium 20 mg Chew Take by mouth.  
amLODIPine 5 mg tablet Take 5 mg by   
mouth once daily.  
chlorthalidone 25 mg tablet Take 25   
mg by mouth once daily.  
Aspirin 81 mg CpDR Take by mouth.  
traMADol 50 mg TbDL Take by mouth.   
(Patient not taking: Reported on  
2022)  
MV with Min-Lycopene-Lutein 0.4   
mg-300 mcg- 250 mcg tab Take by   
mouth.  
(Patient not taking: Reported on   
2022)  
No family history on file.  
Social History  
Tobacco Use  
Smoking status: Never  
Smokeless tobacco: Never  
Objective  
Physical Exam  
Vitals and nursing note reviewed.  
Constitutional:  
Appearance: Normal appearance.  
Cardiovascular:  
Rate and Rhythm: Normal rate and   
regular rhythm.  
Heart sounds: Normal heart sounds.  
Pulmonary:  
Effort: Pulmonary effort is normal.   
No respiratory distress.  
Breath sounds: Normal breath sounds.   
No wheezing or rales.  
Skin:  
General: Skin is warm and dry.  
Findings: No erythema or rash.  
Neurological:  
Mental Status: She is alert.  
ASSESSMENT/PLAN:  
1. SOB (shortness of breath) - ICD9:   
786.05, ICD10: R06.02  
- due to patient stating multiple   
times during exam  I feel like I'm   
going  
to pass out  and  I can't catch my   
breath  she is referred to ER for  
further evaluation and treatment.   
Lab and xray are not open today, the   
day  
after [place]. She verbalized   
understanding.  
Yudelka Peña APRN.CNP           [facility]  
   
                                                    12- Miscellaneous   
Notes                                   Formatting of this note might be   
different from the original.  
Patient calls and notified of   
results and providers instructions.   
Patient verbalizes understanding.  
  
Lidia Bentley RN  
Electronically signed by Lidia Bentley RN at 12/15/2022 8:55 AM   
EST  
Formatting of this note might be   
different from the original.  
LM for patient with results and   
recommendations.Petty Montilla LPN  
  
Electronically signed by Petty Montilla LPN at 12/15/2022 8:52 AM   
EST  
Formatting of this note might be   
different from the original.  
Phone call placed, brief message to   
contact a nurse for results.  
Tara Dockery LPN  
  
  
Electronically signed by Tara Dockery LPN at 12/15/2022 8:50 AM EST  
Formatting of this note might be   
different from the original.  
Please notify of negative covid and   
influenza test. Continue comfort   
measures for symptoms as you would   
for a cold. Any worsening symptoms   
follow up with PCP or ER.  
DORA Rucker.JOEL  
  
Electronically signed by Laxmi Krause APRN.CNP at 12/15/2022 8:47 AM EST  
documented in this encounter            [facility]  
   
                                        2022 Note     HNO ID: [id number]  
Author: DORA Hernandez.CNP  
Service: ?  
Author Type: Nurse Practitioner  
Type: Progress Notes  
Filed: 2022 2:53 PM  
Note Text:  
Subjective  
HPI Marc Decker is a 67 year old   
female who presents with 2 days of  
sore throat, headache, sinus pain,   
congestion, cough, body aches. She  
rates her sore throat 7/10. She has   
been taking tylenol and Zarbees   
cough  
syrup. She has not had a fever. She   
states her granddaughter has   
recently  
been ill with cold symptoms.  
Review of Systems  
Constitutional: Negative for fever.  
HENT: Positive for congestion and   
sore throat.  
Respiratory: Positive for cough.   
Negative for shortness of breath.  
Cardiovascular: Negative.  
Musculoskeletal: Positive for   
myalgias.  
Neurological: Positive for   
headaches.  
/80   Pulse 96   Temp 36.4 ?C   
(97.5 ?F)   Resp 16   Wt 109.3 kg  
(241 lb)   SpO2 96%  
No past medical history on file.  
No past surgical history on file.  
ALLERGIES Ciprofloxacin and   
Penicillins  
MEDICATIONS  
EVOLOCUMAB (REPATHA SYRINGE   
SUBCUTANEOUS) Inject subcutaneously.   
Monthly  
injection  
Potassium 20 mg Chew Take by mouth.  
amLODIPine 5 mg tablet Take 5 mg by   
mouth once daily.  
MV with Min-Lycopene-Lutein 0.4   
mg-300 mcg- 250 mcg tab Take by   
mouth.  
oseltamivir (TAMIFLU) 75 mg capsule   
Take 1 capsule by mouth twice daily  
for 5 days.  
Aspirin 81 mg CpDR Take by mouth.  
traMADol 50 mg TbDL Take by mouth.   
(Patient not taking: Reported on  
2022)  
chlorthalidone 25 mg tablet Take 25   
mg by mouth once daily.  
No family history on file.  
Social History  
Tobacco Use  
Smoking status: Never  
Smokeless tobacco: Never  
Objective  
Physical Exam  
Vitals and nursing note reviewed.  
Constitutional:  
Appearance: Normal appearance. She   
is obese.  
HENT:  
Right Ear: Tympanic membrane, ear   
canal and external ear normal.  
Left Ear: Tympanic membrane, ear   
canal and external ear normal.  
Nose: Congestion present.  
Mouth/Throat:  
Mouth: Mucous membranes are moist.  
Pharynx: Oropharynx is clear. Uvula   
midline. Posterior oropharyngeal  
erythema present. No oropharyngeal   
exudate.  
Cardiovascular:  
Rate and Rhythm: Normal rate and   
regular rhythm.  
Heart sounds: Normal heart sounds.  
Pulmonary:  
Effort: Pulmonary effort is normal.   
No respiratory distress.  
Breath sounds: Normal breath sounds.   
No wheezing or rales.  
Musculoskeletal:  
Cervical back: Neck supple.  
Lymphadenopathy:  
Cervical: No cervical adenopathy.  
Skin:  
General: Skin is warm and dry.  
Findings: No erythema or rash.  
Neurological:  
Mental Status: She is alert.  
ASSESSMENT/PLAN:  
1. Sore throat - ICD9: 462, ICD10:   
J02.9 (primary diagnosis)  
- suspect viral  
- Alere Strep Test NEGATIVE, no   
culture pending  
- Discussed supportive care   
treatment with fluids, rest and   
analgesia.  
- STREP A MOLECULAR (POC)  
2. Viral URI with cough - ICD9:   
465.9, ICD10: J06.9  
- Discussed viral etiology and   
rationale for treatment.  
- Symptomatic treatment with prn   
analgesia  
- Supportive care with fluids and   
rest  
- COVID WITH FLUA+B, ROUTINE  
- OSELTAMIVIR 75 MG CAPSULE  
- Follow-up with your PCP in 3-5   
days if symptoms have not improved   
or  
sooner if symptoms worsen  
- Discussed red flags and need for   
immediate medical evaluation if any  
occur.  
- Discussed supportive care   
treatment with fluids, rest and   
analgesia.  
- Discussed expected course of   
illness  
Yudelka Peña, APRN.[facility]  
   
                                                    2022 Influenza virus   
A and B RNA and SARS-CoV-2   
(COVID-19) N gene panel   
ADRYAN+probe (Resp)                        COVID 19 RESULT:  
SARS-CoV-2 (Agent of COVID-19) Not   
Detected by RT-PCR or equivalent   
method.  
ramses SARS-CoV-2_[employer]   
[employer] (JULIA)_EUA  
This test was developed and its   
performance characteristics   
determined by [facility]'s   
[facility]   
[facility] [place]. This   
test has been authorized by FDA   
under an Emergency Use Authorization   
(EUA).  
This test has been validated in   
accordance with the FDA's Guidance   
Document  Policy for Diagnostics   
Testing in Laboratories Certified to   
Perform High Complexity Testing   
under CLIA prior to Emergency use   
Authorization for Coronavirus   
Disease 2019 during the Public   
Health Emergency  issued on 2020.  
Test performed by [facility], [facility]   
Medicine [place], [address] [place] [address].  
INFLUENZA A PCR:  
Negative for Influenza A by RT-PCR  
INFLUENZA B PCR:  
Negative for Influenza B by RT-PCR      [facility]  
   
                                        Comment on above:   Performed By: #### 9  
[id number] ####  
[facility]  
CLIA [id number]  
[address] [place] [address] [place]   
   
                                        2021 Note     History of Present I  
llness:  
Admission Reason: colon cancer   
screening  
HPI:  
MARC DECKER is a 66 year old   
Female seen at the request of Dr. Pedro for  
colon cancer screening. She has a   
family history of colon cancer. Her   
father  
was diagnosed and  of colon   
cancer at age 62. Her grandfather   
also had  
colon cancer. She has no change in   
bowel habits. No blood in the stool.   
No  
abdominal pain or unintentional   
weight loss. She has daily bowel   
movements and  
is not on any stool softeners, fiber   
supplements or laxatives. Her last  
colonoscopy was over 5 years ago and   
she had polyps at that time. She   
also has  
a sister with colon polyps. She is   
not on any blood thinners.  
  
Comorbidities:  
Comorbidites:  
Comorbid Conditionshypertension  
  
  
Allergies:  
penicillin: Hives/Urticaria  
Cipro I.V.: Unknown  
  
Medications Prior to Admission:  
Admission Medication Reconciliation   
has not been completed for this   
patient.  
  
Objective:  
Physical Exam by System:  
  
Constitutional: NAD, lying supine in   
bed  
Eyes: no scleral icterus  
Respiratory/Thorax: no labored   
breathing  
Cardiovascular: NSR  
Gastrointestinal: soft,   
non-distended  
Musculoskeletal: full range of   
motion  
Extremities: no peripheral edema  
Neurological: alert, oriented x3  
Psychological: normal affect  
Skin: no jaundice  
  
Assessment and Plan:  
Assessment:  
Ms. Decker is a 67yo female in need   
of colon cancer screening. Risks of  
colonoscopy were discussed with the   
patient. This included risks of   
bleeding,  
perforation, missed polyps, and   
potential need for additional   
procedures  
pending findings. She was agreeable   
to proceed.  
  
  
Electronic Signatures:  
Candida Whitley) (Signed   
2021 07:21)  
Authored: History of Present   
Illness, Comorbidities, Allergies,   
Medications  
Prior to Admission, Objective,   
Assessment and Plan, Note Completion  
  
  
Last Updated: 2021 07:21 by   
Candida Whitley)                      [facility]  
   
                                        Evaluation + Plan note   
  
  
Future Appointments  
  
  
Appointment Date:2022 11:15:00   
AM  
Scheduled Provider:  
Location:[facility]  
Appointment Type:MRI Spine Cervical   
w/o Contrast  
  
  
  
Future Scheduled TestsMRI Spine   
Cervical w/o Contrast 22  
  
                                        [facility]  
Work Phone:   
[phone]  
   
                                                    History of Present illness   
Narrative                               The patient is being seen for a   
preoperative visit. The procedure is   
a(n) Rt TKR scheduled for 3/15/22   
with Greg Pena . The   
indication for surgery is   
OA.Surgical Risk Assessment:   
Pertinent Past Medical History: no   
pertinent past medical history.   
Exercise Capacity: able to walk four   
blocks without symptoms and able to   
walk two flights of stairs without   
symptoms. Lifestyle Factors: tobacco   
use, heavy alcohol consumption and   
denies illegal drug use. Symptoms:   
no symptoms.Additional ELSY risk   
factors include age over 50, but   
normal BMI, female gender and normal   
neck circumference. Pertinent Family   
History: no pertinent family   
history.Living Situation: home is   
secure and supportive and no post-op   
concerns with her living   
situation.Compliant with daily blood   
pressure medication. Denies any   
light head or dizzy with use of   
medication. Denies any obvious   
medications side effectsGERD is well   
compensated with daily use of   
medication. Denies any nausea,   
vomiting, diarrhea or   
constipationReports compliance with   
daily cholesterol lowering   
medication. Denies any myalgia   
associated with statin use. Denies   
any obvious side effects to   
medication.Patient reports all   
medications are up to date and no   
refills are necessary at this   
time.Most recent lab, radiologic and   
diagnostic studies reviewed.            [facility]   
[facility]  
Work Phone:   
[phone]  
   
                                        Hospital course Narrative   
  
  
No data available for this section      [facility]  
Work Phone:   
[phone]  
   
                                                    Hospital Discharge   
instructions                              
  
  
No data available for this section      [facility]  
Work Phone:   
[phone]  
   
                                        Note                  
  
  
RAEANN SHORT MD: SIGN, VERIFY  
Event Display: EKG [ED AO] - CV  
Authored Date: [id number]  
  
  
                                        [facility]  
Work Phone:   
[phone]  
   
                                        Progress note         
  
  
No data available for this section      [facility]  
Work Phone:   
[phone]  
  
  
  
Summary Purpose  
  
  
                                                      
  
  
  
Family History  
No Family History Records Found      Mother  
  
                                Name            Dates           Details  
   
                                                    No pertinent family history(  
V49.89, Z78.9)  
                                                    Status:Active  
  
                                     Father  
  
                                Name            Dates           Details  
   
                                                    Family history of Colon nabila  
ocarcinoma(153.9, C18.9)  
                                                    Status:Active  
  
                              Unknown Family Member  
  
                                Name            Dates           Details  
   
                                                    No pertinent family history:  
 Mother(V49.89, Z78.9)  
                                                    Status:Active  
   
                                                    Colon adenocarcinoma: Father  
                                                    Status:Active  
  
                              Unknown Family Member  
  
                                Name            Dates           Details  
   
                                                    No pertinent family history:  
 Mother(V49.89, Z78.9)  
                                                    Status:Active  
   
                                                    Colon adenocarcinoma: Father  
                                                    Status:Active  
  
                              Unknown Family Member  
  
                                Name            Dates           Details  
   
                                                    No pertinent family history:  
 Mother(V49.89, Z78.9)  
                                                    Status:Active  
   
                                                    Colon adenocarcinoma: Father  
                                                    Status:Active  
  
                              Unknown Family Member  
  
                                Name            Dates           Details  
   
                                                    No pertinent family history:  
 Mother(V49.89, Z78.9)  
                                                    Status:Active  
   
                                                    Colon adenocarcinoma: Father  
                                                    Status:Active  
  
                              Unknown Family Member  
  
                                Name            Dates           Details  
   
                                                    No pertinent family history:  
 Mother(V49.89, Z78.9)  
                                                    Status:Active  
   
                                                    Colon adenocarcinoma: Father  
                                                    Status:Active  
  
                              Unknown Family Member  
  
                                Name            Dates           Details  
   
                                                    Colon adenocarcinoma: Father  
                                                    Status:Active  
   
                                                    No pertinent family history:  
 Mother(V49.89, Z78.9)  
                                                    Status:Active  
  
                              Unknown Family Member  
  
                                Name            Dates           Details  
   
                                                    No pertinent family history:  
 Mother(V49.89, Z78.9)  
                                                    Status:Active  
   
                                                    Colon adenocarcinoma: Father  
                                                    Status:Active  
  
                              Unknown Family Member  
  
                                Name            Dates           Details  
   
                                                    Colon adenocarcinoma: Father  
                                                    Status:Active  
   
                                                    No pertinent family history:  
 Mother(V49.89, Z78.9)  
                                                    Status:Active  
  
                              Unknown Family Member  
  
                                Name            Dates           Details  
   
                                                    Colon adenocarcinoma: Father  
                                                    Status:Active  
   
                                                    No pertinent family history:  
 Mother(V49.89, Z78.9)  
                                                    Status:Active  
  
                              Unknown Family Member  
  
                                Name            Dates           Details  
   
                                                    No pertinent family history:  
 Mother(V49.89, Z78.9)  
                                                    Status:Active  
   
                                                    Colon adenocarcinoma: Father  
                                                    Status:Active  
  
                              Unknown Family Member  
  
                                Name            Dates           Details  
   
                                                    Colon adenocarcinoma: Father  
                                                    Status:Active  
   
                                                    No pertinent family history:  
 Mother(V49.89, Z78.9)  
                                                    Status:Active  
  
                              Unknown Family Member  
  
                                Name            Dates           Details  
   
                                                    No pertinent family history:  
 Mother(V49.89, Z78.9)  
                                                    Status:Active  
   
                                                    Colon adenocarcinoma: Father  
                                                    Status:Active  
  
  
  
Advance Directives  
No Advanced Directives Records FoundNo Advanced Directives Records FoundNo 
Advanced Directives Records FoundNo Advanced Directives Records FoundNo Advanced
Directives Records FoundNo Advanced Directives Records FoundNo Advanced 
Directives Records FoundNo Advanced Directives Records FoundNo Advanced
Directives Records Found  
  
Chief Complaint  
pre admit  
  
Health Concerns  
  
  
                          Infection    Onset Date   Last Indicated Resolved Time  
   
                          COVID-19 Rule-Out 2022   2022   12/15/2022  
 8:32 AM EST  
  
  
  
Additional Source Comments  
  
  
  
                                                    INFORMATION SOURCE (unrecogn  
ized section and content)  
   
                                          
  
  
  
                                        DATE CREATED        AUTHOR  
   
                                2019                      [facility]  
  
  
  
                                DATE CREATED    AUTHOR          AUTHOR'S ORGANIZ  
ATION  
   
                                2021                      [facility]  
  
  
  
                                DATE CREATED    AUTHOR          AUTHOR'S ORGANIZ  
ATION  
   
                                2022                      [facility]  
[facility]  
  
  
  
                                DATE CREATED    AUTHOR          AUTHOR'S ORGANIZ  
ATION  
   
                                2022                       [employer]  
  
  
  
                                DATE CREATED    AUTHOR          AUTHOR'S ORGANIZ  
ATION  
   
                                2022                      [facility]  
  
  
  
                                DATE CREATED    AUTHOR          AUTHOR'S ORGANIZ  
ATION  
   
                                2023                      [facility]  
oundation ([place])  
  
  
  
                                DATE CREATED    AUTHOR          AUTHOR'S ORGANIZ  
ATION  
   
                                2024                      [facility]  
[facility]  
  
  
  
                                DATE CREATED    AUTHOR          AUTHOR'S ORGANIZ  
ATION  
   
                                2024                      [facility]  
  
  
  
                                DATE CREATED    AUTHOR          AUTHOR'S ORGANIZ  
ATION  
   
                                09/15/2024                      [facility]  
  
  
  
  
  
                                                    Source Comments (unrecognize  
d section and content)  
   
                                                    In the event this informatio  
n is protected by the Federal Confidentiality of   
Alcohol   
and Drug Abuse Patient Records regulations: This information has been disclosed 
to   
you from records protected by Federal confidentiality rules (42 CFR Part 2). The
  
Federal rules prohibit you from making any further disclosure of this 
information   
unless further disclosure is expressly permitted by the written consent of the 
person   
to whom it pertains or as otherwise permitted by 42 CFR Part 2. A general   
authorization for the release of medical or other information is NOT sufficient 
for   
this purpose. The Federal rules restrict any use of the information to 
criminally   
investigate or prosecute any alcohol or drug abuse patient.[facility]  
  
  
  
  
                                                    Reason for Visit (unrecogniz  
ed section and content)  
   
                                          
  
  
  
                                        Reason              Comments  
   
                                        Results               
  
  
  
  
  
                                                    Care Teams (unrecognized sec  
tion and content)  
   
                                          
  
  
  
                      Team Member Relationship Specialty  Start Date End Date  
   
                                                      
  
  
Savana Pedro DO  
  
  
[phone] (Work)  
  
  
[phone] (Fax) PCP - General   Internal Medicine 11           
  
  
  
  
  
                                                    Care Team (unrecognized sect  
ion and content)  
   
                                                      
  
  
Care Team Personnel  
  
  
Name: JESSICA Clay  
Position: AO RN  
Member Role: ED RN  
  
  
  
Name: CALLUM JOHNS DO  
Position: AH Resident  
Member Role: Resident  
Address:  
Address: [address] [place] [address]  
Emergency Resident  
[place], [place] [address]  
  
  
  
Name: Pina Hines RN  
Position: AO RN  
Member Role: RN  
  
  
  
Name: RAEANN SHORT MD  
Position:  ED Physician  
Member Role: ED Physician  
Address:  
Address: [facility]  
[address] [place], [place] [address]  
  
  
  
No data available for this section  
  
FOR RECORDS PERTAINING TO PATIENTS WHO ARE OR HAVE BEEN ENROLLED IN A CHEMICAL 
DEPENDENCY/SUBSTANCEABUSE PROGRAM, SOME INFORMATION MAY BE OMITTED. This 
clinical summary was aggregated from multiple sources. Caution should be 
exercised in using it in the provision of clinical care. This summary normalizes
information from multiple sources, and as a consequence, information in this 
document may materially change the coding, format and clinical context of 
patient data. In addition, data may be omitted in some cases. CLINICAL DECISIONS
SHOULD BE BASED ON THE PRIMARY CLINICAL RECORDS. [employer] provides 
no warranty or guarantee of the accuracy or completeness of information in this 
document.

## 2024-09-16 NOTE — BI_ITS
REPORT-ID:[id number] 
 
MAMMOGRAPHY - BILATERAL SCREENING 
 
REASON FOR EXAM:    Female, 69 years old.  Routine annual screening 
examination. 
 
PERTINENT HISTORY:  Sister with breast cancer. Grandmother with breast 
cancer. 
 
TECHNIQUE:   Digital bilateral breast ishmael (3D mammographic acquisition) in 
the CC and MLO projections. 2-D mediolateral oblique (MLO) and craniocaudad 
(CC) views of both breasts were obtained.  CAD: Full Field Digital 
Mammography with Computer Added Detection was performed. 
 
COMPARISON:   Comparison is made with prior study dated August 28, 2020. 
___________________________________ 
 
FINDINGS: 
Breast Composition:  There are scattered areas of fibroglandular density. 
 
There are no dominant masses or suspicious calcifications. 
 
No other significant abnormalities are identified.  There has been no 
significant change since the prior study. 
___________________________________ 
 
ORDER #: [id number] BI/SCRN MAMM (CAD)W/ISHMAEL BILAT  
IMPRESSION:  
Stable bilateral screening mammogram.  Yearly follow-up mammogram  
recommended.   (A)  
 
  
___________________________________  
 
  
ASSESSMENT CATEGORY:  
BIRADS Category 1:  Negative.  A letter regarding these results will be  
sent to the patient by the facility within 30 days.  
 
  
Approximately 10% of breast cancers are not detected by mammography.  A  
normal mammogram should not delay biopsy of a clinically suspicious  
abnormality.  
 
  
[id number]  
 
  
Electronically Signed:  
Lionel Layne MD  
2024/09/16 at 14:44 EDT  
Reading Location ID and State: 603 / [place]  
Tel [phone], Service support  [phone], Fax [phone]

## 2025-03-24 ENCOUNTER — HOSPITAL ENCOUNTER (OUTPATIENT)
Dept: HOSPITAL 100 - MTLAB | Age: 70
Discharge: HOME | End: 2025-03-24
Payer: MEDICARE

## 2025-03-24 DIAGNOSIS — E78.2: ICD-10-CM

## 2025-03-24 DIAGNOSIS — Z86.19: ICD-10-CM

## 2025-03-24 DIAGNOSIS — E11.65: Primary | ICD-10-CM

## 2025-03-24 LAB
ALANINE AMINOTRANSFER ALT/SGPT: 13 U/L (ref ?–34)
ALBUMIN SERPL-MCNC: 4.2 G/DL (ref 3.4–4.8)
ALKALINE PHOSPHATASE: 95 U/L (ref 35–104)
ANION GAP: 11 (ref 5–15)
AST(SGOT): 18 U/L (ref ?–31)
BUN SERPL-MCNC: 14 MG/DL (ref 4–19)
BUN/CREAT RATIO: 21.8 RATIO (ref 10–20)
CALCIUM SERPL-MCNC: 9.5 MG/DL (ref 7.6–11)
CARBON DIOXIDE: 27.3 MMOL/L (ref 21–32)
CHLORIDE: 103 MMOL/L (ref 98–108)
CHOLEST SERPL-MCNC: 190 MG/DL (ref ?–200)
CHOLESTEROL:HDL RATIO SCREEN: 3.29
GLOBULIN: 2.8 G/DL (ref 2.2–4.2)
GLUCOSE: 116 MG/DL (ref 70–99)
POTASSIUM: 4.1 MMOL/L (ref 3.3–5.1)
TRIGLYCERIDES: 120 MG/DL
VLDLC SERPL-MCNC: 24 MG/DL (ref 5–40)

## 2025-03-24 PROCEDURE — 86803 HEPATITIS C AB TEST: CPT

## 2025-03-24 PROCEDURE — 36415 COLL VENOUS BLD VENIPUNCTURE: CPT

## 2025-03-24 PROCEDURE — 80061 LIPID PANEL: CPT

## 2025-03-24 PROCEDURE — 80053 COMPREHEN METABOLIC PANEL: CPT

## 2025-03-24 PROCEDURE — 83036 HEMOGLOBIN GLYCOSYLATED A1C: CPT
